# Patient Record
Sex: FEMALE | Race: WHITE | NOT HISPANIC OR LATINO | Employment: PART TIME | URBAN - METROPOLITAN AREA
[De-identification: names, ages, dates, MRNs, and addresses within clinical notes are randomized per-mention and may not be internally consistent; named-entity substitution may affect disease eponyms.]

---
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
## 2019-02-21 ENCOUNTER — OFFICE VISIT CONVERTED (OUTPATIENT)
Dept: NEUROLOGY | Facility: CLINIC | Age: 19
End: 2019-02-21
Attending: PHYSICIAN ASSISTANT

## 2019-05-20 ENCOUNTER — HOSPITAL ENCOUNTER (OUTPATIENT)
Dept: URGENT CARE | Facility: CLINIC | Age: 19
Discharge: HOME | End: 2019-05-20
Attending: NURSE PRACTITIONER

## 2020-05-19 ENCOUNTER — OFFICE VISIT CONVERTED (OUTPATIENT)
Dept: NEUROLOGY | Facility: CLINIC | Age: 20
End: 2020-05-19
Attending: NURSE PRACTITIONER

## 2020-06-01 ENCOUNTER — HOSPITAL ENCOUNTER (OUTPATIENT)
Dept: URGENT CARE | Facility: CLINIC | Age: 20
Discharge: HOME | End: 2020-06-01

## 2021-05-11 ENCOUNTER — OFFICE VISIT CONVERTED (OUTPATIENT)
Dept: NEUROLOGY | Facility: CLINIC | Age: 21
End: 2021-05-11
Attending: NURSE PRACTITIONER

## 2021-05-13 NOTE — PROGRESS NOTES
Progress Note      Patient Name: Janey Wakefield   Patient ID: [id number]   Sex: Female   YOB: 2000    Primary Care Provider: Concetta Sarmiento MD   Referring Provider: Cali Petersen    Visit Date: May 19, 2020    Provider: PAO Ceron   Location: [facility]   Location Address: [address] [place] [address]  [id number]   Location Phone: [phone]          Chief Complaint  · Follow Up Exam      History Of Present Illness  Janey Wakefield is a 19 year old /White female who presents today to [facility] today for a follow up exam. Has history of IIH. Has not had recent ophthalmology consult. Denies recurrent headaches. Denies blurred vision. Denies pulsatile tinnitus.       Past Medical History  Abdominal Pain, RLQ; Adnexal cyst; Bladder problem; IIH (idiopathic intracranial hypertension); Renal Calculus; Ureteral Calculus         Past Surgical History  Lumbar puncture; Ovarian cystectomy; Tonsillectomy         Medication List  Liletta 19.5 mcg/24 hour (4 years) intrauterine intrauterine device; Zoloft 50 mg oral tablet         Allergy List  Percocet         Family Medical History  Ulcerative Colitis; Crohn's Disease; Renal Calculus; Family history of Arthritis; Family history of diabetes mellitus         Social History  Alcohol (Never); Denies illicit substance abuse (Never); High school attendee; lives with parents; Single; Student; Tobacco (Never); Working         Review of Systems  · Constitutional  o Denies  o : chills, excessive sweating, fatigue, fever, sycope/passing out, weight gain, weight loss  · Eyes  o Denies  o : changes in vision, blurry vision, double vision  · HENT  o Admits  o : nasal congestion, seasonal allergies  o Denies  o : loss of hearing, ringing in the ears, ear aches, sore throat, sinus pain, nose bleeds  · Cardiovascular  o Denies  o : blood clots, swollen legs, anemia, easy burising or bleeding,  transfusions  · Respiratory  o Admits  o : dry cough, productive cough  o Denies  o : shortness of breath, pneumonia, COPD  · Gastrointestinal  o Denies  o : difficulty swallowing, reflux  · Genitourinary  o Denies  o : incontinence  · Neurologic  o Denies  o : headache, seizure, stroke, tremor, loss of balance, falls, dizziness/vertigo, difficulty with sleep, numbness/tingling/paresthesia , difficulty with coordination, difficulty with dexterity, weakness  · Musculoskeletal  o Denies  o : neck stiffness/pain, swollen lymph nodes, muscle aches, joint pain, weakness, spasms, sciatica, pain radiating in arm, pain radiating in leg, low back pain  · Endocrine  o Denies  o : diabetes, thyroid disorder  · Psychiatric  o Admits  o : anxiety, depression      Vitals  Date Time BP Position Site L\R Cuff Size HR RR TEMP (F) WT  HT  BMI kg/m2 BSA m2 O2 Sat HC       05/19/2020 04:14 /79 Sitting    95 - R  97.9 184lbs 6oz              Physical Examination  · Constitutional  o Appearance  o : well-nourished, well groomed, in no apparent distress  · Neurologic  o Mental Status Examination  o :   § Orientation  § : Alert and oriented to person, place, and time.   § Speech/Language  § : intact naming, comprehension, and repetition. No dysarthria.  § Fund of Knowledge  § : Adequate fund of knowledge.  o Cranial Nerves  o : Pupils are equal, round and reactive to light. Extraocular movements are intact. Visual fields are full. Fundoscopic examination reveals sharp disc bilaterally. Sensation in the V1-V3 distribution is intact and symmetric. Muscles of mastication are strong and symmetric. Muscles of facial expression are strong and symmetric. Hearing is intact. Palatal raise is intact and symmetric. Uvula is midline. Shoulder shrug is strong. Tongue protrudes in the midline.  o Reflexes  o : 2+ reflexes throughout and symmetric. Negative Rowland. Negative Babinski.   o Sensation  o : Intact sensation to light touch, pinprick,  vibration and proprioception throughout  o Gait and Station  o : The patient is noted to have a normal, narrow based gait with normal arm swing  o Cerebellar Function  o : intact finger to nose and heel to shin. Rapid alternating movements are intact in the upper and lower extremities.           Assessment  · IIH (idiopathic intracranial hypertension)     348.2/G93.2  Recommend f/u with ophthalmology. Discussed s/sx IIH. Currently, she is asymptomatic. Will continue to monitor.     Problems Reconciled  Plan  · Medications  o Medications have been Reconciled  o Transition of Care or Provider Policy  · Instructions  o Encouraged to follow-up with Primary Care Provider for preventative care.  o Call or Return if symptoms worsen or persist.   o Follow up in 6 months.            Electronically Signed by: PAO Ceron -Author on Dorothy 3, 2020 04:08:32 PM

## 2021-05-15 VITALS
HEART RATE: 97 BPM | WEIGHT: 169 LBS | DIASTOLIC BLOOD PRESSURE: 72 MMHG | SYSTOLIC BLOOD PRESSURE: 120 MMHG | HEIGHT: 63 IN | BODY MASS INDEX: 29.95 KG/M2

## 2021-05-15 VITALS
TEMPERATURE: 97.9 F | DIASTOLIC BLOOD PRESSURE: 79 MMHG | HEART RATE: 95 BPM | SYSTOLIC BLOOD PRESSURE: 127 MMHG | WEIGHT: 184.37 LBS

## 2021-05-16 VITALS
WEIGHT: 183.19 LBS | SYSTOLIC BLOOD PRESSURE: 140 MMHG | BODY MASS INDEX: 32.46 KG/M2 | HEART RATE: 90 BPM | DIASTOLIC BLOOD PRESSURE: 74 MMHG | HEIGHT: 63 IN

## 2021-05-16 VITALS
WEIGHT: 173 LBS | HEART RATE: 91 BPM | HEIGHT: 63 IN | SYSTOLIC BLOOD PRESSURE: 119 MMHG | DIASTOLIC BLOOD PRESSURE: 66 MMHG | BODY MASS INDEX: 30.65 KG/M2

## 2021-05-16 VITALS
TEMPERATURE: 98.2 F | BODY MASS INDEX: 31.83 KG/M2 | SYSTOLIC BLOOD PRESSURE: 121 MMHG | WEIGHT: 173 LBS | HEART RATE: 99 BPM | DIASTOLIC BLOOD PRESSURE: 67 MMHG | HEIGHT: 62 IN

## 2021-05-16 VITALS
WEIGHT: 176 LBS | SYSTOLIC BLOOD PRESSURE: 127 MMHG | DIASTOLIC BLOOD PRESSURE: 67 MMHG | HEART RATE: 94 BPM | BODY MASS INDEX: 32.39 KG/M2 | HEIGHT: 62 IN | TEMPERATURE: 98.7 F

## 2021-06-05 NOTE — PROGRESS NOTES
Progress Note      Patient Name: Janey Wakefield   Patient ID: [id number]   Sex: Female   YOB: 2000    Primary Care Provider: Concetta Sarmiento MD   Referring Provider: Cali Petersen    Visit Date: May 11, 2021    Provider: PAO Ceron   [place] [facility]   Location Address: [address] [place] [address]  [id number]   Location Phone: [phone]          Chief Complaint     3 month f/u IIH       History Of Present Illness  Janey Wkaefield is a 19 year old /White female who presents today to [facility] today for a follow up exam. States she was having re-emergence of headaches back in the fall and saw ophthalmology who felt she may being having flare of IIH. However, she did not follow up as planned with them or with our office. Has not been seen by neurology for 1 year. Currently states headaches have improved. Denies pulsatile tinnitus. States that 1 month ago she had an onset of headaches that lasted for several days.       Past Medical History  Abdominal Pain, RLQ; Adnexal cyst; Bladder problem; IIH (idiopathic intracranial hypertension); Renal Calculus; Ureteral Calculus         Past Surgical History  Lumbar puncture; Ovarian cystectomy; Tonsillectomy         Medication List  Liletta 19.5 mcg/24 hour (4 years) intrauterine intrauterine device         Allergy List  Percocet       Allergies Reconciled  Family Medical History  Ulcerative Colitis; Crohn's Disease; Renal Calculus; Family history of Arthritis; Family history of diabetes mellitus         Social History  Alcohol (Never); Denies illicit substance abuse (Never); High school attendee; lives with parents; Single; Student; Tobacco (Never); Working         Review of Systems  · Constitutional  o Denies  o : chills, excessive sweating, fatigue, fever, sycope/passing out, weight gain, weight loss  · Eyes  o Denies  o : changes in vision, blurry vision, double  "vision  · HENT  o Admits  o : nasal congestion, seasonal allergies  o Denies  o : loss of hearing, ringing in the ears, ear aches, sore throat, sinus pain, nose bleeds  · Cardiovascular  o Denies  o : blood clots, swollen legs, anemia, easy burising or bleeding, transfusions  · Respiratory  o Admits  o : dry cough, productive cough  o Denies  o : shortness of breath, pneumonia, COPD  · Gastrointestinal  o Denies  o : difficulty swallowing, reflux  · Genitourinary  o Denies  o : incontinence  · Neurologic  o Denies  o : headache, seizure, stroke, tremor, loss of balance, falls, dizziness/vertigo, difficulty with sleep, numbness/tingling/paresthesia , difficulty with coordination, difficulty with dexterity, weakness  · Musculoskeletal  o Denies  o : neck stiffness/pain, swollen lymph nodes, muscle aches, joint pain, weakness, spasms, sciatica, pain radiating in arm, pain radiating in leg, low back pain  · Endocrine  o Denies  o : diabetes, thyroid disorder  · Psychiatric  o Admits  o : anxiety, depression      Vitals  Date Time BP Position Site L\R Cuff Size HR RR TEMP (F) WT  HT  BMI kg/m2 BSA m2 O2 Sat FR L/min FiO2 HC       05/11/2021 10:08 /86 Sitting    84 - R   185lbs 16oz 5'  3\" 32.95 1.94             Physical Examination  · Constitutional  o Appearance  o : well-nourished, well groomed, in no apparent distress  · Neurologic  o Mental Status Examination  o :   § Orientation  § : Alert and oriented to person, place, and time.   § Speech/Language  § : intact naming, comprehension, and repetition. No dysarthria.  § Fund of Knowledge  § : Adequate fund of knowledge.  o Cranial Nerves  o : Pupils are equal, round and reactive to light. Extraocular movements are intact. Visual fields are full. Fundoscopic examination reveals sharp disc bilaterally. Sensation in the V1-V3 distribution is intact and symmetric. Muscles of mastication are strong and symmetric. Muscles of facial expression are strong and symmetric. " Hearing is intact. Palatal raise is intact and symmetric. Uvula is midline. Shoulder shrug is strong. Tongue protrudes in the midline.  o Reflexes  o : 2+ reflexes throughout and symmetric. Negative Rowland. Negative Babinski.   o Sensation  o : Intact sensation to light touch, pinprick, vibration and proprioception throughout  o Gait and Station  o : The patient is noted to have a normal, narrow based gait with normal arm swing  o Cerebellar Function  o : intact finger to nose and heel to shin. Rapid alternating movements are intact in the upper and lower extremities.               Assessment  · IIH (idiopathic intracranial hypertension)     348.2/G93.2  Asymptomatic today. No papilledema. Will continue to monitor. Spoke with the patient and her mother in great detail regarding compliance with appts and ophthalmology follow up. Did not follow up here or with ophthalmology as scheduled. Discussed high pressure symptoms. They will notify me immediately if she begins to have these symptoms again. Discussed risk of permanent visual loss if pressures are not maintained.       Plan  · Medications  o Medications have been Reconciled  o Transition of Care or Provider Policy  · Instructions  o Total time spent with the patient and coordinating patient care was 35 minutes.   · Disposition  o Call or Return if symptoms worsen or persist.            Electronically Signed by: PAO Ceron -Author on May 14, 2021 09:44:01 AM

## 2021-07-15 VITALS
WEIGHT: 186 LBS | SYSTOLIC BLOOD PRESSURE: 134 MMHG | HEART RATE: 84 BPM | HEIGHT: 63 IN | BODY MASS INDEX: 32.96 KG/M2 | DIASTOLIC BLOOD PRESSURE: 86 MMHG

## 2021-10-11 ENCOUNTER — OFFICE VISIT (OUTPATIENT)
Dept: NEUROLOGY | Facility: CLINIC | Age: 21
End: 2021-10-11

## 2021-10-11 VITALS
SYSTOLIC BLOOD PRESSURE: 130 MMHG | HEART RATE: 100 BPM | HEIGHT: 62 IN | BODY MASS INDEX: 33.16 KG/M2 | WEIGHT: 180.2 LBS | DIASTOLIC BLOOD PRESSURE: 79 MMHG

## 2021-10-11 DIAGNOSIS — G93.2 IIH (IDIOPATHIC INTRACRANIAL HYPERTENSION): Primary | ICD-10-CM

## 2021-10-11 PROCEDURE — 99213 OFFICE O/P EST LOW 20 MIN: CPT | Performed by: NURSE PRACTITIONER

## 2021-10-11 RX ORDER — ACETAZOLAMIDE 500 MG/1
500 CAPSULE, EXTENDED RELEASE ORAL 2 TIMES DAILY
Qty: 60 CAPSULE | Refills: 3 | Status: CANCELLED | OUTPATIENT
Start: 2021-10-11

## 2021-10-11 NOTE — PATIENT INSTRUCTIONS
"Samuel's Clinical Advisor 2018, 1st Edition (1st ed., pp. 690.e5-690.e6). Annie PA: Elsevier.\"> Sb Neurological Surgery (7th ed., pp. 0831-3375.e5). MAKENNA Valencia: Elsevier, Inc.\"> https://www.ncbi.nlm.nih.gov/books/AZA626807/\">   Idiopathic Intracranial Hypertension    Idiopathic intracranial hypertension (IIH) is a condition that increases pressure around the brain. The fluid that surrounds the brain and spinal cord (cerebrospinal fluid, or CSF) increases and causes the pressure. Idiopathic means that the cause of this condition is not known.  IIH affects the brain and spinal cord (neurological disorder). If this condition is not treated, it can cause vision loss or blindness.  What are the causes?  The cause of this condition is not known.  What increases the risk?  The following factors may make you more likely to develop this condition:  · Being very overweight (obese).  · Being a female between the ages of 20 and 50 years old, who has not gone through menopause.  · Taking certain medicines, such as birth control or steroids.  What are the signs or symptoms?  Symptoms of this condition include:  · Headaches. This is the most common symptom.  · Brief episodes of total blindness.  · Double vision, blurred vision, or poor side (peripheral) vision.  · Pain in the shoulders or neck.  · Nausea and vomiting.  · A sound like rushing water or a pulsing sound within the ears (pulsatile tinnitus), or ringing in the ears.  How is this diagnosed?  This condition may be diagnosed based on:  · Your symptoms and medical history.  · Imaging tests of the brain, such as:  ? CT scan.  ? MRI.  ? Magnetic resonance venogram (MRV) to check the veins.  · Diagnostic lumbar puncture. This is a procedure to remove and examine a sample of cerebrospinal fluid. This procedure can determine whether too much fluid may be causing IIH.  · A thorough eye exam to check for swelling or nerve damage in the eyes.  How is this " treated?  Treatment for this condition depends on the symptoms. The goal of treatment is to decrease the pressure around your brain. Common treatments include:  · Weight loss through healthy eating, salt restriction, and exercise, if you are overweight.  · Medicines to decrease the production of spinal fluid and lower the pressure within your skull.  · Medicines to prevent or treat headaches.  Other treatments may include:  · Surgery to place drains (shunts) in your brain for removing excess fluid.  · Lumbar puncture to remove excess cerebrospinal fluid.  Follow these instructions at home:  · If you are overweight or obese, work with your health care provider to lose weight.  · Take over-the-counter and prescription medicines only as told by your health care provider.  · Ask your health care provider if the medicine prescribed to you requires you to avoid driving or using machinery.  · Do not use any products that contain nicotine or tobacco, such as cigarettes, e-cigarettes, and chewing tobacco. If you need help quitting, ask your health care provider.  · Keep all follow-up visits as told by your health care provider. This is important.  Contact a health care provider if:  You have changes in your vision, such as:  · Double vision.  · Blurred vision.  · Poor peripheral vision.  Get help right away if:  You have any of the following symptoms and they get worse or do not get better:  · Headaches.  · Nausea.  · Vomiting.  · Sudden trouble seeing.  Summary  · Idiopathic intracranial hypertension (IIH) is a condition that increases pressure around the brain. The cause is not known (is idiopathic).  · The most common symptom of IIH is headaches. Vision changes, pain in the shoulders or neck, nausea, and vomiting may also occur.  · Treatment for this condition depends on your symptoms. The goal of treatment is to decrease the pressure around your brain.  · If you are overweight or obese, work with your health care  provider to lose weight.  · Take over-the-counter and prescription medicines only as told by your health care provider.  This information is not intended to replace advice given to you by your health care provider. Make sure you discuss any questions you have with your health care provider.  Document Revised: 11/28/2020 Document Reviewed: 11/28/2020  Elsevier Patient Education © 2021 Elsevier Inc.

## 2021-10-11 NOTE — PROGRESS NOTES
"Chief Complaint  Neurologic Problem (IIH)    Subjective          Janey Wakefield presents to [facility]  Remains on diamox. States she's doing well since previous visit.  Denies recurrence of pulsatile tinnitus.  Headaches are well controlled.  Saw ophthalmology recently who stated that her papilledema is improving.       Objective   Vital Signs:   /79   Pulse 100   Ht 157.5 cm (62\")   Wt 81.7 kg (180 lb 3.2 oz)   BMI 32.96 kg/m²     Physical Exam  HENT:      Head: Normocephalic.   Pulmonary:      Effort: Pulmonary effort is normal.   Neurological:      Mental Status: She is alert and oriented to person, place, and time.      Cranial Nerves: Cranial nerves are intact.      Sensory: Sensation is intact.      Motor: Motor function is intact.      Coordination: Coordination is intact.      Deep Tendon Reflexes: Reflexes are normal and symmetric.        Neurologic Exam     Mental Status   Oriented to person, place, and time.        Result Review :               Assessment and Plan    Diagnoses and all orders for this visit:    1. IIH (idiopathic intracranial hypertension) (Primary)  Assessment & Plan:  Continue Diamox at current dose.  Discussed high pressure symptoms and the need to notify the office if those occur.         Follow Up   Return in about 6 months (around 4/11/2022) for IIH.  Patient was given instructions and counseling regarding her condition or for health maintenance advice. Please see specific information pulled into the AVS if appropriate.       "

## 2021-10-12 PROBLEM — G93.2 IIH (IDIOPATHIC INTRACRANIAL HYPERTENSION): Status: ACTIVE | Noted: 2021-10-12

## 2021-10-12 NOTE — ASSESSMENT & PLAN NOTE
Continue Diamox at current dose.  Discussed high pressure symptoms and the need to notify the office if those occur.

## 2022-12-28 ENCOUNTER — TELEPHONE (OUTPATIENT)
Dept: INTERNAL MEDICINE | Facility: CLINIC | Age: 22
End: 2022-12-28

## 2023-01-19 ENCOUNTER — OFFICE VISIT (OUTPATIENT)
Dept: INTERNAL MEDICINE | Facility: CLINIC | Age: 23
End: 2023-01-19
Payer: COMMERCIAL

## 2023-01-19 VITALS
TEMPERATURE: 98.1 F | WEIGHT: 164.31 LBS | HEIGHT: 62 IN | SYSTOLIC BLOOD PRESSURE: 120 MMHG | BODY MASS INDEX: 30.24 KG/M2 | HEART RATE: 102 BPM | OXYGEN SATURATION: 98 % | DIASTOLIC BLOOD PRESSURE: 82 MMHG

## 2023-01-19 DIAGNOSIS — Z00.00 ENCOUNTER FOR MEDICAL EXAMINATION TO ESTABLISH CARE: Primary | ICD-10-CM

## 2023-01-19 DIAGNOSIS — F41.1 GENERALIZED ANXIETY DISORDER: Chronic | ICD-10-CM

## 2023-01-19 DIAGNOSIS — Z13.220 SCREENING FOR LIPID DISORDERS: ICD-10-CM

## 2023-01-19 DIAGNOSIS — Z23 NEED FOR IMMUNIZATION AGAINST INFLUENZA: ICD-10-CM

## 2023-01-19 DIAGNOSIS — Z11.59 NEED FOR HEPATITIS C SCREENING TEST: ICD-10-CM

## 2023-01-19 DIAGNOSIS — F39 MOOD DISORDER: ICD-10-CM

## 2023-01-19 LAB
ALBUMIN SERPL-MCNC: 4.5 G/DL (ref 3.5–5.2)
ALBUMIN/GLOB SERPL: 1.8 G/DL
ALP SERPL-CCNC: 70 U/L (ref 39–117)
ALT SERPL W P-5'-P-CCNC: 19 U/L (ref 1–33)
ANION GAP SERPL CALCULATED.3IONS-SCNC: 9.4 MMOL/L (ref 5–15)
AST SERPL-CCNC: 15 U/L (ref 1–32)
BASOPHILS # BLD AUTO: 0.07 10*3/MM3 (ref 0–0.2)
BASOPHILS NFR BLD AUTO: 0.8 % (ref 0–1.5)
BILIRUB SERPL-MCNC: 0.6 MG/DL (ref 0–1.2)
BUN SERPL-MCNC: 9 MG/DL (ref 6–20)
BUN/CREAT SERPL: 13.8 (ref 7–25)
CALCIUM SPEC-SCNC: 9.5 MG/DL (ref 8.6–10.5)
CHLORIDE SERPL-SCNC: 102 MMOL/L (ref 98–107)
CHOLEST SERPL-MCNC: 198 MG/DL (ref 0–200)
CO2 SERPL-SCNC: 25.6 MMOL/L (ref 22–29)
CREAT SERPL-MCNC: 0.65 MG/DL (ref 0.57–1)
DEPRECATED RDW RBC AUTO: 37.6 FL (ref 37–54)
EGFRCR SERPLBLD CKD-EPI 2021: 127.1 ML/MIN/1.73
EOSINOPHIL # BLD AUTO: 0.08 10*3/MM3 (ref 0–0.4)
EOSINOPHIL NFR BLD AUTO: 0.9 % (ref 0.3–6.2)
ERYTHROCYTE [DISTWIDTH] IN BLOOD BY AUTOMATED COUNT: 11 % (ref 12.3–15.4)
GLOBULIN UR ELPH-MCNC: 2.5 GM/DL
GLUCOSE SERPL-MCNC: 80 MG/DL (ref 65–99)
HCT VFR BLD AUTO: 41.6 % (ref 34–46.6)
HDLC SERPL-MCNC: 50 MG/DL (ref 40–60)
HGB BLD-MCNC: 14.6 G/DL (ref 12–15.9)
IMM GRANULOCYTES # BLD AUTO: 0.05 10*3/MM3 (ref 0–0.05)
IMM GRANULOCYTES NFR BLD AUTO: 0.6 % (ref 0–0.5)
LDLC SERPL CALC-MCNC: 136 MG/DL (ref 0–100)
LDLC/HDLC SERPL: 2.69 {RATIO}
LYMPHOCYTES # BLD AUTO: 2.25 10*3/MM3 (ref 0.7–3.1)
LYMPHOCYTES NFR BLD AUTO: 25.6 % (ref 19.6–45.3)
MCH RBC QN AUTO: 32.5 PG (ref 26.6–33)
MCHC RBC AUTO-ENTMCNC: 35.1 G/DL (ref 31.5–35.7)
MCV RBC AUTO: 92.7 FL (ref 79–97)
MONOCYTES # BLD AUTO: 0.69 10*3/MM3 (ref 0.1–0.9)
MONOCYTES NFR BLD AUTO: 7.9 % (ref 5–12)
NEUTROPHILS NFR BLD AUTO: 5.64 10*3/MM3 (ref 1.7–7)
NEUTROPHILS NFR BLD AUTO: 64.2 % (ref 42.7–76)
NRBC BLD AUTO-RTO: 0 /100 WBC (ref 0–0.2)
PLATELET # BLD AUTO: 338 10*3/MM3 (ref 140–450)
PMV BLD AUTO: 10.4 FL (ref 6–12)
POTASSIUM SERPL-SCNC: 4.1 MMOL/L (ref 3.5–5.2)
PROT SERPL-MCNC: 7 G/DL (ref 6–8.5)
RBC # BLD AUTO: 4.49 10*6/MM3 (ref 3.77–5.28)
SODIUM SERPL-SCNC: 137 MMOL/L (ref 136–145)
TRIGL SERPL-MCNC: 67 MG/DL (ref 0–150)
TSH SERPL DL<=0.05 MIU/L-ACNC: 1.12 UIU/ML (ref 0.27–4.2)
VLDLC SERPL-MCNC: 12 MG/DL (ref 5–40)
WBC NRBC COR # BLD: 8.78 10*3/MM3 (ref 3.4–10.8)

## 2023-01-19 PROCEDURE — 99214 OFFICE O/P EST MOD 30 MIN: CPT | Performed by: NURSE PRACTITIONER

## 2023-01-19 PROCEDURE — 80061 LIPID PANEL: CPT | Performed by: NURSE PRACTITIONER

## 2023-01-19 PROCEDURE — 84443 ASSAY THYROID STIM HORMONE: CPT | Performed by: NURSE PRACTITIONER

## 2023-01-19 PROCEDURE — 80053 COMPREHEN METABOLIC PANEL: CPT | Performed by: NURSE PRACTITIONER

## 2023-01-19 PROCEDURE — 86803 HEPATITIS C AB TEST: CPT | Performed by: NURSE PRACTITIONER

## 2023-01-19 PROCEDURE — 90686 IIV4 VACC NO PRSV 0.5 ML IM: CPT | Performed by: NURSE PRACTITIONER

## 2023-01-19 PROCEDURE — 85025 COMPLETE CBC W/AUTO DIFF WBC: CPT | Performed by: NURSE PRACTITIONER

## 2023-01-19 PROCEDURE — 90471 IMMUNIZATION ADMIN: CPT | Performed by: NURSE PRACTITIONER

## 2023-01-19 RX ORDER — BUSPIRONE HYDROCHLORIDE 5 MG/1
5 TABLET ORAL 3 TIMES DAILY
Qty: 90 TABLET | Refills: 1 | Status: SHIPPED | OUTPATIENT
Start: 2023-01-19 | End: 2023-03-08

## 2023-01-19 NOTE — PROGRESS NOTES
Chief Complaint  Establish Care, Depression (Has been on medication before), and Anxiety    Subjective          Janey Wakefield presents to [facility]  Anxiety  Presents for initial visit. Symptoms include decreased concentration, depressed mood, excessive worry, insomnia, irritability, nervous/anxious behavior and obsessions. Patient reports no chest pain, compulsions, confusion, dizziness, dry mouth, feeling of choking, hyperventilation, impotence, malaise, muscle tension, nausea, palpitations, panic, restlessness, shortness of breath or suicidal ideas. Symptoms occur constantly. The severity of symptoms is moderate and interfering with daily activities.     Her past medical history is significant for depression. Past treatments include SSRIs and non-SSRI antidepressants. The treatment provided no relief. Compliance with prior treatments has been good.   Depression  Visit Type: initial  Patient presents with the following symptoms: decreased concentration, depressed mood, excessive worry, insomnia, irritability, nervousness/anxiety and obsessions.  Patient is not experiencing: anhedonia, chest pain, choking sensation, compulsions, confusion, dizziness, dry mouth, fatigue, feelings of hopelessness, feelings of worthlessness, hypersomnia, hyperventilation, impotence, malaise, memory impairment, muscle tension, nausea, palpitations, panic, psychomotor agitation, psychomotor retardation, restlessness, shortness of breath, suicidal ideas, suicidal planning, thoughts of death, weight gain and weight loss.  Severity: interfering with daily activities   Treatment tried: non-SSRI antidepressants and SSRI  Compliance with treatment: good  Improvement on treatment: no relief      Struggles with promiscuity and spending a lot of money   5 days or so was in a really good place productive happy positive. Woke up this morning depressive state crying   Previous PCP: madelaine  "revore  COVID Vaccine: never  Tdap: 2010   Pneumonia: not of age  Shingles: not of age  Specialist(s): optho (Dr. Petersen), neuro (America Rice), has seen urology  Colonoscopy: never  Mammogram: never, no FHx of breast cancer  PAP Smear: a couple of months ago (dr. locke)  DEXA/Bone Density: not of age        Current Outpatient Medications   Medication Instructions   • acetaZOLAMIDE (DIAMOX) 500 mg, Oral, 2 Times Daily   • busPIRone (BUSPAR) 5 mg, Oral, 3 Times Daily   • fluticasone (FLONASE) 50 MCG/ACT nasal spray 2 sprays, Nasal, Daily   • Levonorgestrel (Liletta, 52 MG,) 19.5 MCG/DAY intrauterine device No dose, route, or frequency recorded.       The following portions of the patient's history were reviewed and updated as appropriate: allergies, current medications, past family history, past medical history, past social history, past surgical history, and problem list.    Objective   Vital Signs:   /82 (BP Location: Left arm, Patient Position: Sitting, Cuff Size: Adult)   Pulse 102   Temp 98.1 °F (36.7 °C) (Temporal)   Ht 157.5 cm (62\")   Wt 74.5 kg (164 lb 5 oz)   SpO2 98%   BMI 30.05 kg/m²     Wt Readings from Last 3 Encounters:   01/19/23 74.5 kg (164 lb 5 oz)   10/11/21 81.7 kg (180 lb 3.2 oz)   08/10/21 80.6 kg (177 lb 12.8 oz)     BP Readings from Last 3 Encounters:   01/19/23 120/82   10/11/21 130/79   08/10/21 130/75     Physical Exam   Appearance: No acute distress, well-nourished  Head: normocephalic, atraumatic  Eyes: extraocular movements intact, no scleral icterus, no conjunctival injection  Ears, Nose, and Throat: external ears normal, nares patent, moist mucous membranes  Cardiovascular: regular rate and rhythm. no murmurs, rubs, or gallops. no edema  Respiratory: breathing comfortably, symmetric chest rise, clear to auscultation bilaterally. No wheezes, rales, or rhonchi.  Neuro: alert and oriented to time, place, and person. Normal gait  Psych: normal mood and affect     Result " Review :   The following data was reviewed by: PAO Veloz on 01/19/2023:  Common labs    Common Labs 1/19/23 1/19/23 1/19/23    1632 1632 1632   Glucose  80    BUN  9    Creatinine  0.65    Sodium  137    Potassium  4.1    Chloride  102    Calcium  9.5    Albumin  4.5    Total Bilirubin  0.6    Alkaline Phosphatase  70    AST (SGOT)  15    ALT (SGPT)  19    WBC 8.78     Hemoglobin 14.6     Hematocrit 41.6     Platelets 338     Total Cholesterol   198   Triglycerides   67   HDL Cholesterol   50   LDL Cholesterol    136 (A)   (A) Abnormal value                   Lab Results   Component Value Date    SARSANTIGEN Not Detected 09/25/2021    RAPSCRN Negative 08/10/2021       Procedures        Assessment and Plan    Diagnoses and all orders for this visit:    1. Encounter for medical examination to establish care (Primary)    2. Need for immunization against influenza  -     FluLaval/Fluzone >6 mos  ([id number])    3. Generalized anxiety disorder  Comments:  will RX Buspar   Orders:  -     CBC & Differential  -     TSH Rfx On Abnormal To Free T4  -     Comprehensive Metabolic Panel  -     GeneSight - Swab,; Future  -     busPIRone (BUSPAR) 5 MG tablet; Take 1 tablet by mouth 3 (Three) Times a Day.  Dispense: 90 tablet; Refill: 1    4. Mood disorder (HCC)  -     CBC & Differential  -     TSH Rfx On Abnormal To Free T4  -     Comprehensive Metabolic Panel  -     GeneSight - Swab,; Future    5. Screening for lipid disorders  -     Lipid Panel    6. Need for hepatitis C screening test  -     HCV Antibody Rfx To Qnt PCR  -     Interpretation:      + MDQ - will wait for Genesight results and give antipsychotic/mood stabilizer. I believe patient's mood disorder has been misdiagnosed as anxiety/depression      Medications Discontinued During This Encounter   Medication Reason   • cetirizine-pseudoephedrine (ZyrTEC-D) 5-120 MG per 12 hr tablet *Therapy completed          Follow Up   Return in about 6 weeks (around  3/2/2023) for Anxiety, Depression, Video visit.  Patient was given instructions and counseling regarding her condition or for health maintenance advice. Please see specific information pulled into the AVS if appropriate.       Padmini Hernández, PAO  01/23/23  11:38 EST

## 2023-01-21 LAB
HCV AB S/CO SERPL IA: <0.1 S/CO RATIO (ref 0–0.9)
HCV AB SERPL QL IA: NORMAL

## 2023-01-23 PROBLEM — F39 MOOD DISORDER: Status: ACTIVE | Noted: 2023-01-23

## 2023-01-23 PROBLEM — F41.1 GENERALIZED ANXIETY DISORDER: Status: ACTIVE | Noted: 2023-01-23

## 2023-01-30 ENCOUNTER — TELEPHONE (OUTPATIENT)
Dept: INTERNAL MEDICINE | Facility: CLINIC | Age: 23
End: 2023-01-30
Payer: COMMERCIAL

## 2023-01-30 NOTE — TELEPHONE ENCOUNTER
Caller: Janey Wakefield    Relationship: Self    Best call back number: [phone]    What test was performed: GENE SIGHT TESTING     When was the test performed: 1.19.23    Where was the test performed: IN OFFICE

## 2023-01-31 DIAGNOSIS — F39 MOOD DISORDER: Primary | ICD-10-CM

## 2023-01-31 RX ORDER — ARIPIPRAZOLE 2 MG/1
2 TABLET ORAL DAILY
Qty: 45 TABLET | Refills: 1 | Status: SHIPPED | OUTPATIENT
Start: 2023-01-31 | End: 2023-03-08 | Stop reason: SDUPTHER

## 2023-01-31 NOTE — TELEPHONE ENCOUNTER
Caller: Janey Wakefield    Relationship: Self    Best call back number: [phone]    What is the best time to reach you: ANY     Who are you requesting to speak with (clinical staff, provider,  specific staff member): CLINICAL       What was the call regarding: PATIENT CALLED TO CHECK ON THE STATUS OF HER TESTING AND REQUESTED SOMEONE CALL HER BACK AS SOON AS POSSIBLE WITH RESULTS.     Do you require a callback: YES

## 2023-02-01 ENCOUNTER — TELEPHONE (OUTPATIENT)
Dept: INTERNAL MEDICINE | Facility: CLINIC | Age: 23
End: 2023-02-01
Payer: COMMERCIAL

## 2023-02-01 NOTE — TELEPHONE ENCOUNTER
I CONTACTED PT PHARMACY AND TALKED TO NELLY, SHE STATED THEY RECEIVED THE PRESCRIPTION ON 1/31/2023 AND IT IS READY FOR PICKUP

## 2023-02-01 NOTE — TELEPHONE ENCOUNTER
CONTACTED PT TO NOTIFY OF PHARMACY INFORMATION- PT VERIFIED  I INFORMED PT THAT HER ABILIFY HAS BEEN SENT TO THE PHARMACY AND I MADE A 6 WEEK F/U APPT WITH ROSETTE PER PROVIDER REQUEST

## 2023-02-01 NOTE — TELEPHONE ENCOUNTER
Caller: JANNETTE MARTINEZ    Relationship: SELF    Best call back number: [phone]    Requested Prescriptions:   MEDICATION FOR BIPOLAR    Pharmacy where request should be sent: [facility] [address] - [phone]  - [phone] FX     Additional details provided by patient: PHARMACY HAS NOT RECEIVED ANYTHING FOR BIPOLAR MEDICATION THAT PATIENT CAN TAKE. SHE IS REQUESTING STATUS ON THIS.     Cassandra Bustillo Rep   02/01/23 16:27 EST

## 2023-03-08 ENCOUNTER — OFFICE VISIT (OUTPATIENT)
Dept: INTERNAL MEDICINE | Facility: CLINIC | Age: 23
End: 2023-03-08
Payer: COMMERCIAL

## 2023-03-08 VITALS
TEMPERATURE: 98.4 F | SYSTOLIC BLOOD PRESSURE: 108 MMHG | BODY MASS INDEX: 30.36 KG/M2 | DIASTOLIC BLOOD PRESSURE: 72 MMHG | WEIGHT: 165 LBS | OXYGEN SATURATION: 98 % | HEART RATE: 91 BPM | HEIGHT: 62 IN

## 2023-03-08 DIAGNOSIS — F39 MOOD DISORDER: Primary | Chronic | ICD-10-CM

## 2023-03-08 DIAGNOSIS — F41.1 GENERALIZED ANXIETY DISORDER: Chronic | ICD-10-CM

## 2023-03-08 PROBLEM — N32.9 BLADDER PROBLEM: Status: RESOLVED | Noted: 2023-03-08 | Resolved: 2023-03-08

## 2023-03-08 PROBLEM — N20.0 RENAL CALCULUS: Status: RESOLVED | Noted: 2023-03-08 | Resolved: 2023-03-08

## 2023-03-08 PROBLEM — N20.0 RENAL CALCULUS: Status: ACTIVE | Noted: 2023-03-08

## 2023-03-08 PROBLEM — G93.2 IIH (IDIOPATHIC INTRACRANIAL HYPERTENSION): Status: RESOLVED | Noted: 2021-10-12 | Resolved: 2023-03-08

## 2023-03-08 PROBLEM — N32.9 BLADDER PROBLEM: Status: ACTIVE | Noted: 2023-03-08

## 2023-03-08 PROCEDURE — 99214 OFFICE O/P EST MOD 30 MIN: CPT | Performed by: NURSE PRACTITIONER

## 2023-03-08 RX ORDER — BUSPIRONE HYDROCHLORIDE 15 MG/1
15 TABLET ORAL 3 TIMES DAILY
Qty: 90 TABLET | Refills: 1 | Status: SHIPPED | OUTPATIENT
Start: 2023-03-08

## 2023-03-08 RX ORDER — ARIPIPRAZOLE 2 MG/1
2 TABLET ORAL DAILY
Qty: 90 TABLET | Refills: 0 | Status: SHIPPED | OUTPATIENT
Start: 2023-03-08

## 2023-03-08 NOTE — PROGRESS NOTES
Chief Complaint  Anxiety (6 week follow-up) and Depression (6 week follow-up)    Subjective          Janey Wakefield presents to [facility]  History of Present Illness    Anxiety  Presents for follow-upl visit. Symptoms include decreased concentration, depressed mood, excessive worry, insomnia, irritability, nervous/anxious behavior and obsessions. Patient reports no chest pain, compulsions, confusion, dizziness, dry mouth, feeling of choking, hyperventilation, impotence, malaise, muscle tension, nausea, palpitations, panic, restlessness, shortness of breath or suicidal ideas. Symptoms occur constantly. The severity of symptoms is moderate and interfering with daily activities.     Her past medical history is significant for depression. Past treatments include SSRIs and non-SSRI antidepressants. The treatment provided no relief. Compliance with prior treatments has been good.     Depression  Visit Type: follow-up  Patient presents with the following symptoms: decreased concentration, depressed mood, excessive worry, insomnia, irritability, nervousness/anxiety and obsessions.  Patient is not experiencing: anhedonia, chest pain, choking sensation, compulsions, confusion, dizziness, dry mouth, fatigue, feelings of hopelessness, feelings of worthlessness, hypersomnia, hyperventilation, impotence, malaise, memory impairment, muscle tension, nausea, palpitations, panic, psychomotor agitation, psychomotor retardation, restlessness, shortness of breath, suicidal ideas, suicidal planning, thoughts of death, weight gain and weight loss.  Severity: interfering with daily activities   Treatment tried: non-SSRI antidepressants and SSRI  Compliance with treatment: good  Improvement on treatment: no relief      Everyone has noticed a difference. Feels more like herself. The anxiety medicine could be increased.     Current Outpatient Medications   Medication Instructions   •  "ARIPiprazole (ABILIFY) 2 mg, Oral, Daily   • busPIRone (BUSPAR) 15 mg, Oral, 3 Times Daily   • fluticasone (FLONASE) 50 MCG/ACT nasal spray 2 sprays, Nasal, Daily   • Levonorgestrel (Liletta, 52 MG,) 19.5 MCG/DAY intrauterine device No dose, route, or frequency recorded.       The following portions of the patient's history were reviewed and updated as appropriate: allergies, current medications, past family history, past medical history, past social history, past surgical history, and problem list.    Objective   Vital Signs:   /72 (BP Location: Left arm, Patient Position: Sitting, Cuff Size: Large Adult)   Pulse 91   Temp 98.4 °F (36.9 °C) (Temporal)   Ht 157.5 cm (62\")   Wt 74.8 kg (165 lb)   SpO2 98%   BMI 30.18 kg/m²     Wt Readings from Last 3 Encounters:   03/08/23 74.8 kg (165 lb)   01/19/23 74.5 kg (164 lb 5 oz)   10/11/21 81.7 kg (180 lb 3.2 oz)     BP Readings from Last 3 Encounters:   03/08/23 108/72   01/19/23 120/82   10/11/21 130/79     Physical Exam   Appearance: No acute distress, well-nourished  Head: normocephalic, atraumatic  Eyes: extraocular movements intact, no scleral icterus, no conjunctival injection  Ears, Nose, and Throat: external ears normal, nares patent, moist mucous membranes  Cardiovascular: regular rate and rhythm. no murmurs, rubs, or gallops. no edema  Respiratory: breathing comfortably, symmetric chest rise, clear to auscultation bilaterally. No wheezes, rales, or rhonchi.  Neuro: alert and oriented to time, place, and person. Normal gait  Psych: normal mood and affect     Result Review :   The following data was reviewed by: PAO Veloz on 03/08/2023:  Common labs    Common Labs 1/19/23 1/19/23 1/19/23    1632 1632 1632   Glucose  80    BUN  9    Creatinine  0.65    Sodium  137    Potassium  4.1    Chloride  102    Calcium  9.5    Albumin  4.5    Total Bilirubin  0.6    Alkaline Phosphatase  70    AST (SGOT)  15    ALT (SGPT)  19    WBC 8.78   "   Hemoglobin 14.6     Hematocrit 41.6     Platelets 338     Total Cholesterol   198   Triglycerides   67   HDL Cholesterol   50   LDL Cholesterol    136 (A)   (A) Abnormal value                   Lab Results   Component Value Date    SARSANTIGEN Not Detected 09/25/2021    RAPSCRN Negative 08/10/2021       Procedures        Assessment and Plan    Diagnoses and all orders for this visit:    1. Mood disorder (HCC) (Primary)  Assessment & Plan:  Psychological condition is stable .  Continue current treatment regimen.  Regular aerobic exercise.  Psychological condition  will be reassessed in 3 months.        Orders:  -     ARIPiprazole (Abilify) 2 MG tablet; Take 1 tablet by mouth Daily.  Dispense: 90 tablet; Refill: 0    2. Generalized anxiety disorder  Assessment & Plan:  Psychological condition is improving with treatment.  Regular aerobic exercise.  Medication changes per orders.  Psychological condition  will be reassessed in 3 months.    Increasing Buspar to 15 mg TID    Orders:  -     busPIRone (BUSPAR) 15 MG tablet; Take 1 tablet by mouth 3 (Three) Times a Day.  Dispense: 90 tablet; Refill: 1        Medications Discontinued During This Encounter   Medication Reason   • acetaZOLAMIDE (DIAMOX) 500 MG capsule    • busPIRone (BUSPAR) 5 MG tablet    • ARIPiprazole (Abilify) 2 MG tablet Reorder          Follow Up   Return in about 3 months (around 6/8/2023) for Anxiety, Depression.  Patient was given instructions and counseling regarding her condition or for health maintenance advice. Please see specific information pulled into the AVS if appropriate.       Padmini Hernández, APRN  03/08/23  08:43 EST

## 2023-03-08 NOTE — ASSESSMENT & PLAN NOTE
Psychological condition is stable .  Continue current treatment regimen.  Regular aerobic exercise.  Psychological condition  will be reassessed in 3 months.

## 2023-03-08 NOTE — ASSESSMENT & PLAN NOTE
Psychological condition is improving with treatment.  Regular aerobic exercise.  Medication changes per orders.  Psychological condition  will be reassessed in 3 months.    Increasing Buspar to 15 mg TID

## 2023-09-14 DIAGNOSIS — F39 MOOD DISORDER: Chronic | ICD-10-CM

## 2023-09-14 RX ORDER — ARIPIPRAZOLE 5 MG/1
TABLET ORAL
Qty: 30 TABLET | Refills: 1 | Status: SHIPPED | OUTPATIENT
Start: 2023-09-14

## 2023-10-10 DIAGNOSIS — F41.1 GENERALIZED ANXIETY DISORDER: Chronic | ICD-10-CM

## 2023-10-10 NOTE — TELEPHONE ENCOUNTER
Caller: HEAVENLY MARTINEZ    Relationship: Mother    Best call back number: [phone]     Requested Prescriptions:   Requested Prescriptions     Pending Prescriptions Disp Refills    busPIRone (BUSPAR) 15 MG tablet 90 tablet 1     Sig: Take 1 tablet by mouth 3 (Three) Times a Day.        Pharmacy where request should be sent: [facility] [address] [place] [address] - [phone]  - [phone]     Last office visit with prescribing clinician: 7/13/2023   Last telemedicine visit with prescribing clinician: Visit date not found   Next office visit with prescribing clinician: Visit date not found     Additional details provided by patient: OUT OF MEDICATION     Does the patient have less than a 3 day supply:  [x] Yes  [] No    Would you like a call back once the refill request has been completed: [] Yes [] No    If the office needs to give you a call back, can they leave a voicemail: [] Yes [] No    Cassandra Reich Rep   10/10/23 16:39 EDT

## 2023-10-11 RX ORDER — BUSPIRONE HYDROCHLORIDE 15 MG/1
15 TABLET ORAL 3 TIMES DAILY
Qty: 90 TABLET | Refills: 1 | Status: SHIPPED | OUTPATIENT
Start: 2023-10-11

## 2023-11-28 ENCOUNTER — TELEPHONE (OUTPATIENT)
Dept: INTERNAL MEDICINE | Facility: CLINIC | Age: 23
End: 2023-11-28
Payer: COMMERCIAL

## 2023-11-28 DIAGNOSIS — F39 MOOD DISORDER: Chronic | ICD-10-CM

## 2023-11-28 NOTE — TELEPHONE ENCOUNTER
Caller: LateshasJaney    Relationship: Self    Best call back number: [phone]     Requested Prescriptions:   Requested Prescriptions     Pending Prescriptions Disp Refills    ARIPiprazole (ABILIFY) 5 MG tablet 30 tablet 1     Sig: Take 1 tablet by mouth Daily.        Pharmacy where request should be sent: [facility] [place] [id number] [place] [address] - [phone]  - [phone] FX     Last office visit with prescribing clinician: 7/13/2023   Last telemedicine visit with prescribing clinician: Visit date not found   Next office visit with prescribing clinician: Visit date not found     Does the patient have less than a 3 day supply:  [x] Yes  [] No    Would you like a call back once the refill request has been completed: [] Yes [x] No    If the office needs to give you a call back, can they leave a voicemail: [] Yes [x] No    America Orellana, PCT   11/28/23 15:48 EST

## 2023-11-29 RX ORDER — ARIPIPRAZOLE 5 MG/1
5 TABLET ORAL DAILY
Qty: 30 TABLET | Refills: 1 | Status: SHIPPED | OUTPATIENT
Start: 2023-11-29

## 2024-07-01 PROCEDURE — 87480 CANDIDA DNA DIR PROBE: CPT

## 2024-07-01 PROCEDURE — 87186 SC STD MICRODIL/AGAR DIL: CPT

## 2024-07-01 PROCEDURE — 87510 GARDNER VAG DNA DIR PROBE: CPT

## 2024-07-01 PROCEDURE — 87077 CULTURE AEROBIC IDENTIFY: CPT

## 2024-07-01 PROCEDURE — 87086 URINE CULTURE/COLONY COUNT: CPT

## 2024-07-01 PROCEDURE — 87660 TRICHOMONAS VAGIN DIR PROBE: CPT

## 2024-07-03 DIAGNOSIS — N39.0 ACUTE UTI: Primary | ICD-10-CM

## 2024-07-03 DIAGNOSIS — N76.0 GARDNERELLA VAGINALIS INFECTION: ICD-10-CM

## 2024-07-03 DIAGNOSIS — B96.89 GARDNERELLA VAGINALIS INFECTION: ICD-10-CM

## 2024-07-03 RX ORDER — METRONIDAZOLE 500 MG/1
500 TABLET ORAL 2 TIMES DAILY
Qty: 14 TABLET | Refills: 0 | Status: SHIPPED | OUTPATIENT
Start: 2024-07-03 | End: 2024-07-10

## 2024-07-03 RX ORDER — SULFAMETHOXAZOLE AND TRIMETHOPRIM 800; 160 MG/1; MG/1
1 TABLET ORAL 2 TIMES DAILY
Qty: 14 TABLET | Refills: 0 | Status: SHIPPED | OUTPATIENT
Start: 2024-07-03

## 2024-07-08 ENCOUNTER — TELEPHONE (OUTPATIENT)
Dept: INTERNAL MEDICINE | Facility: CLINIC | Age: 24
End: 2024-07-08

## 2024-07-08 NOTE — TELEPHONE ENCOUNTER
Caller: ROYCE MARTINEZ    Relationship: Mother    Best call back number: [phone]       What was the call regarding:       THE PATIENT'S MOTHER SAID THE PATIENT LOST HER ANTIBIOTIC THAT WAS PRESCRIBED BY PCP MARIA R AND IS WANTING TO KNOW IF PCP WOULD PRESCRIBE ANOTHER ONE.         [facility] [place], [place] - [address] [place] - [phone]  - [phone]  [phone]       SHE  IS REQUESTING  A CALL TO ADVISE IF THIS CAN BE DONE

## 2024-07-11 ENCOUNTER — PATIENT ROUNDING (BHMG ONLY) (OUTPATIENT)
Dept: URGENT CARE | Facility: CLINIC | Age: 24
End: 2024-07-11
Payer: COMMERCIAL

## 2024-09-27 ENCOUNTER — ANESTHESIA EVENT (OUTPATIENT)
Dept: PERIOP | Facility: HOSPITAL | Age: 24
End: 2024-09-27
Payer: COMMERCIAL

## 2024-09-27 ENCOUNTER — APPOINTMENT (OUTPATIENT)
Dept: CT IMAGING | Facility: HOSPITAL | Age: 24
End: 2024-09-27
Payer: COMMERCIAL

## 2024-09-27 ENCOUNTER — HOSPITAL ENCOUNTER (OUTPATIENT)
Facility: HOSPITAL | Age: 24
Setting detail: OBSERVATION
Discharge: HOME | End: 2024-09-28
Attending: EMERGENCY MEDICINE | Admitting: OBSTETRICS & GYNECOLOGY
Payer: COMMERCIAL

## 2024-09-27 ENCOUNTER — ANESTHESIA (OUTPATIENT)
Dept: PERIOP | Facility: HOSPITAL | Age: 24
End: 2024-09-27
Payer: COMMERCIAL

## 2024-09-27 DIAGNOSIS — N83.209 CYST OF OVARY, UNSPECIFIED LATERALITY: ICD-10-CM

## 2024-09-27 DIAGNOSIS — R10.9 ABDOMINAL PAIN, UNSPECIFIED ABDOMINAL LOCATION: ICD-10-CM

## 2024-09-27 DIAGNOSIS — N83.201 RIGHT OVARIAN CYST: ICD-10-CM

## 2024-09-27 DIAGNOSIS — K66.1 HEMOPERITONEUM: Primary | ICD-10-CM

## 2024-09-27 PROBLEM — Z48.89 ENCOUNTER FOR POSTOPERATIVE CARE: Status: ACTIVE | Noted: 2024-09-27

## 2024-09-27 PROBLEM — D36.9 DERMOID CYST: Status: ACTIVE | Noted: 2024-09-27

## 2024-09-27 PROBLEM — D36.9 DERMOID CYST: Status: RESOLVED | Noted: 2024-09-27 | Resolved: 2024-09-27

## 2024-09-27 PROBLEM — D62 ACUTE BLOOD LOSS ANEMIA: Status: ACTIVE | Noted: 2024-09-27

## 2024-09-27 PROBLEM — N83.202 HEMORRHAGIC CYST OF LEFT OVARY: Status: ACTIVE | Noted: 2024-09-27

## 2024-09-27 LAB
ABO GROUP BLD: NORMAL
ABO GROUP BLD: NORMAL
ALBUMIN SERPL-MCNC: 3.9 G/DL (ref 3.5–5.2)
ALBUMIN/GLOB SERPL: 1.6 G/DL
ALP SERPL-CCNC: 66 U/L (ref 39–117)
ALT SERPL W P-5'-P-CCNC: 21 U/L (ref 1–33)
ANION GAP SERPL CALCULATED.3IONS-SCNC: 10.9 MMOL/L (ref 5–15)
AST SERPL-CCNC: 16 U/L (ref 1–32)
BASOPHILS # BLD AUTO: 0.1 10*3/MM3 (ref 0–0.2)
BASOPHILS NFR BLD AUTO: 0.6 % (ref 0–1.5)
BILIRUB SERPL-MCNC: 0.6 MG/DL (ref 0–1.2)
BILIRUB UR QL STRIP: NEGATIVE
BLD GP AB SCN SERPL QL: NEGATIVE
BUN SERPL-MCNC: 14 MG/DL (ref 6–20)
BUN/CREAT SERPL: 21.9 (ref 7–25)
CALCIUM SPEC-SCNC: 8.6 MG/DL (ref 8.6–10.5)
CHLORIDE SERPL-SCNC: 103 MMOL/L (ref 98–107)
CLARITY UR: ABNORMAL
CO2 SERPL-SCNC: 21.1 MMOL/L (ref 22–29)
COLOR UR: YELLOW
CREAT SERPL-MCNC: 0.64 MG/DL (ref 0.57–1)
DEPRECATED RDW RBC AUTO: 45.1 FL (ref 37–54)
EGFRCR SERPLBLD CKD-EPI 2021: 126 ML/MIN/1.73
EOSINOPHIL # BLD AUTO: 0.18 10*3/MM3 (ref 0–0.4)
EOSINOPHIL NFR BLD AUTO: 1 % (ref 0.3–6.2)
ERYTHROCYTE [DISTWIDTH] IN BLOOD BY AUTOMATED COUNT: 13.1 % (ref 12.3–15.4)
GLOBULIN UR ELPH-MCNC: 2.5 GM/DL
GLUCOSE SERPL-MCNC: 117 MG/DL (ref 65–99)
GLUCOSE UR STRIP-MCNC: NEGATIVE MG/DL
HCG INTACT+B SERPL-ACNC: <0.5 MIU/ML
HCT VFR BLD AUTO: 25.7 % (ref 34–46.6)
HCT VFR BLD AUTO: 35.7 % (ref 34–46.6)
HGB BLD-MCNC: 12.4 G/DL (ref 12–15.9)
HGB BLD-MCNC: 8.6 G/DL (ref 12–15.9)
HGB UR QL STRIP.AUTO: NEGATIVE
HOLD SPECIMEN: NORMAL
HOLD SPECIMEN: NORMAL
IMM GRANULOCYTES # BLD AUTO: 0.1 10*3/MM3 (ref 0–0.05)
IMM GRANULOCYTES NFR BLD AUTO: 0.6 % (ref 0–0.5)
KETONES UR QL STRIP: NEGATIVE
LEUKOCYTE ESTERASE UR QL STRIP.AUTO: NEGATIVE
LIPASE SERPL-CCNC: 91 U/L (ref 13–60)
LYMPHOCYTES # BLD AUTO: 2.16 10*3/MM3 (ref 0.7–3.1)
LYMPHOCYTES NFR BLD AUTO: 12 % (ref 19.6–45.3)
MCH RBC QN AUTO: 32.7 PG (ref 26.6–33)
MCHC RBC AUTO-ENTMCNC: 34.7 G/DL (ref 31.5–35.7)
MCV RBC AUTO: 94.2 FL (ref 79–97)
MONOCYTES # BLD AUTO: 1.26 10*3/MM3 (ref 0.1–0.9)
MONOCYTES NFR BLD AUTO: 7 % (ref 5–12)
NEUTROPHILS NFR BLD AUTO: 14.26 10*3/MM3 (ref 1.7–7)
NEUTROPHILS NFR BLD AUTO: 78.8 % (ref 42.7–76)
NITRITE UR QL STRIP: NEGATIVE
NRBC BLD AUTO-RTO: 0 /100 WBC (ref 0–0.2)
PH UR STRIP.AUTO: 5.5 [PH] (ref 5–8)
PLATELET # BLD AUTO: 339 10*3/MM3 (ref 140–450)
PMV BLD AUTO: 9.4 FL (ref 6–12)
POTASSIUM SERPL-SCNC: 4 MMOL/L (ref 3.5–5.2)
PROT SERPL-MCNC: 6.4 G/DL (ref 6–8.5)
PROT UR QL STRIP: ABNORMAL
RBC # BLD AUTO: 3.79 10*6/MM3 (ref 3.77–5.28)
RH BLD: POSITIVE
RH BLD: POSITIVE
SODIUM SERPL-SCNC: 135 MMOL/L (ref 136–145)
SP GR UR STRIP: >1.03 (ref 1–1.03)
T&S EXPIRATION DATE: NORMAL
UROBILINOGEN UR QL STRIP: ABNORMAL
WBC NRBC COR # BLD AUTO: 18.06 10*3/MM3 (ref 3.4–10.8)
WHOLE BLOOD HOLD COAG: NORMAL
WHOLE BLOOD HOLD SPECIMEN: NORMAL

## 2024-09-27 PROCEDURE — 86901 BLOOD TYPING SEROLOGIC RH(D): CPT

## 2024-09-27 PROCEDURE — 25010000002 ALBUMIN HUMAN 5% PER 50 ML

## 2024-09-27 PROCEDURE — 25810000003 LACTATED RINGERS PER 1000 ML: Performed by: ANESTHESIOLOGY

## 2024-09-27 PROCEDURE — G0378 HOSPITAL OBSERVATION PER HR: HCPCS

## 2024-09-27 PROCEDURE — 25010000002 CEFAZOLIN PER 500 MG: Performed by: NURSE ANESTHETIST, CERTIFIED REGISTERED

## 2024-09-27 PROCEDURE — 99291 CRITICAL CARE FIRST HOUR: CPT

## 2024-09-27 PROCEDURE — 88307 TISSUE EXAM BY PATHOLOGIST: CPT | Performed by: OBSTETRICS & GYNECOLOGY

## 2024-09-27 PROCEDURE — 25010000002 HYDROMORPHONE 1 MG/ML SOLUTION

## 2024-09-27 PROCEDURE — 96374 THER/PROPH/DIAG INJ IV PUSH: CPT

## 2024-09-27 PROCEDURE — 25010000002 FENTANYL CITRATE (PF) 50 MCG/ML SOLUTION: Performed by: NURSE ANESTHETIST, CERTIFIED REGISTERED

## 2024-09-27 PROCEDURE — 88108 CYTOPATH CONCENTRATE TECH: CPT | Performed by: OBSTETRICS & GYNECOLOGY

## 2024-09-27 PROCEDURE — 36415 COLL VENOUS BLD VENIPUNCTURE: CPT

## 2024-09-27 PROCEDURE — 25010000002 BUPIVACAINE (PF) 0.25 % SOLUTION: Performed by: OBSTETRICS & GYNECOLOGY

## 2024-09-27 PROCEDURE — 85014 HEMATOCRIT: CPT

## 2024-09-27 PROCEDURE — 86901 BLOOD TYPING SEROLOGIC RH(D): CPT | Performed by: EMERGENCY MEDICINE

## 2024-09-27 PROCEDURE — 88305 TISSUE EXAM BY PATHOLOGIST: CPT | Performed by: OBSTETRICS & GYNECOLOGY

## 2024-09-27 PROCEDURE — 85018 HEMOGLOBIN: CPT

## 2024-09-27 PROCEDURE — 25010000002 MIDAZOLAM PER 1MG: Performed by: ANESTHESIOLOGY

## 2024-09-27 PROCEDURE — 86900 BLOOD TYPING SEROLOGIC ABO: CPT

## 2024-09-27 PROCEDURE — 25010000002 SUGAMMADEX 200 MG/2ML SOLUTION

## 2024-09-27 PROCEDURE — 85025 COMPLETE CBC W/AUTO DIFF WBC: CPT | Performed by: EMERGENCY MEDICINE

## 2024-09-27 PROCEDURE — 74177 CT ABD & PELVIS W/CONTRAST: CPT

## 2024-09-27 PROCEDURE — 84702 CHORIONIC GONADOTROPIN TEST: CPT | Performed by: EMERGENCY MEDICINE

## 2024-09-27 PROCEDURE — 25810000003 SODIUM CHLORIDE 0.9 % SOLUTION: Performed by: EMERGENCY MEDICINE

## 2024-09-27 PROCEDURE — 25010000002 ONDANSETRON PER 1 MG: Performed by: EMERGENCY MEDICINE

## 2024-09-27 PROCEDURE — 96375 TX/PRO/DX INJ NEW DRUG ADDON: CPT

## 2024-09-27 PROCEDURE — 25010000002 ONDANSETRON PER 1 MG: Performed by: NURSE ANESTHETIST, CERTIFIED REGISTERED

## 2024-09-27 PROCEDURE — 83690 ASSAY OF LIPASE: CPT | Performed by: EMERGENCY MEDICINE

## 2024-09-27 PROCEDURE — 25010000002 DEXAMETHASONE PER 1 MG: Performed by: NURSE ANESTHETIST, CERTIFIED REGISTERED

## 2024-09-27 PROCEDURE — P9041 ALBUMIN (HUMAN),5%, 50ML: HCPCS

## 2024-09-27 PROCEDURE — 25010000002 MORPHINE PER 10 MG: Performed by: EMERGENCY MEDICINE

## 2024-09-27 PROCEDURE — 80053 COMPREHEN METABOLIC PANEL: CPT | Performed by: EMERGENCY MEDICINE

## 2024-09-27 PROCEDURE — 96361 HYDRATE IV INFUSION ADD-ON: CPT

## 2024-09-27 PROCEDURE — 25010000002 NA FERRIC GLUC CPLX PER 12.5 MG: Performed by: OBSTETRICS & GYNECOLOGY

## 2024-09-27 PROCEDURE — 81003 URINALYSIS AUTO W/O SCOPE: CPT | Performed by: EMERGENCY MEDICINE

## 2024-09-27 PROCEDURE — 86900 BLOOD TYPING SEROLOGIC ABO: CPT | Performed by: EMERGENCY MEDICINE

## 2024-09-27 PROCEDURE — 86850 RBC ANTIBODY SCREEN: CPT | Performed by: EMERGENCY MEDICINE

## 2024-09-27 PROCEDURE — 25010000002 PROPOFOL 10 MG/ML EMULSION: Performed by: NURSE ANESTHETIST, CERTIFIED REGISTERED

## 2024-09-27 PROCEDURE — 25010000002 KETOROLAC TROMETHAMINE PER 15 MG: Performed by: OBSTETRICS & GYNECOLOGY

## 2024-09-27 PROCEDURE — 25510000001 IOPAMIDOL PER 1 ML: Performed by: EMERGENCY MEDICINE

## 2024-09-27 DEVICE — ABSORBABLE HEMOSTAT (OXIDIZED REGENERATED CELLULOSE)
Type: IMPLANTABLE DEVICE | Site: ABDOMEN | Status: FUNCTIONAL
Brand: SURGICEL NU-KNIT

## 2024-09-27 RX ORDER — ONDANSETRON 2 MG/ML
4 INJECTION INTRAMUSCULAR; INTRAVENOUS EVERY 6 HOURS PRN
Status: DISCONTINUED | OUTPATIENT
Start: 2024-09-27 | End: 2024-09-28 | Stop reason: HOSPADM

## 2024-09-27 RX ORDER — KETOROLAC TROMETHAMINE 15 MG/ML
15 INJECTION, SOLUTION INTRAMUSCULAR; INTRAVENOUS EVERY 6 HOURS
Status: COMPLETED | OUTPATIENT
Start: 2024-09-27 | End: 2024-09-28

## 2024-09-27 RX ORDER — ONDANSETRON 2 MG/ML
4 INJECTION INTRAMUSCULAR; INTRAVENOUS ONCE AS NEEDED
Status: DISCONTINUED | OUTPATIENT
Start: 2024-09-27 | End: 2024-09-27

## 2024-09-27 RX ORDER — FENTANYL CITRATE 50 UG/ML
INJECTION, SOLUTION INTRAMUSCULAR; INTRAVENOUS AS NEEDED
Status: DISCONTINUED | OUTPATIENT
Start: 2024-09-27 | End: 2024-09-27 | Stop reason: SURG

## 2024-09-27 RX ORDER — DEXMEDETOMIDINE HYDROCHLORIDE 100 UG/ML
INJECTION, SOLUTION INTRAVENOUS AS NEEDED
Status: DISCONTINUED | OUTPATIENT
Start: 2024-09-27 | End: 2024-09-27 | Stop reason: SURG

## 2024-09-27 RX ORDER — EPHEDRINE SULFATE 50 MG/ML
INJECTION INTRAVENOUS AS NEEDED
Status: DISCONTINUED | OUTPATIENT
Start: 2024-09-27 | End: 2024-09-27 | Stop reason: SURG

## 2024-09-27 RX ORDER — MEPERIDINE HYDROCHLORIDE 25 MG/ML
12.5 INJECTION INTRAMUSCULAR; INTRAVENOUS; SUBCUTANEOUS
Status: DISCONTINUED | OUTPATIENT
Start: 2024-09-27 | End: 2024-09-27

## 2024-09-27 RX ORDER — IOPAMIDOL 755 MG/ML
100 INJECTION, SOLUTION INTRAVASCULAR
Status: COMPLETED | OUTPATIENT
Start: 2024-09-27 | End: 2024-09-27

## 2024-09-27 RX ORDER — ONDANSETRON 2 MG/ML
INJECTION INTRAMUSCULAR; INTRAVENOUS AS NEEDED
Status: DISCONTINUED | OUTPATIENT
Start: 2024-09-27 | End: 2024-09-27 | Stop reason: SURG

## 2024-09-27 RX ORDER — PROPOFOL 10 MG/ML
VIAL (ML) INTRAVENOUS AS NEEDED
Status: DISCONTINUED | OUTPATIENT
Start: 2024-09-27 | End: 2024-09-27 | Stop reason: SURG

## 2024-09-27 RX ORDER — SODIUM CHLORIDE 0.9 % (FLUSH) 0.9 %
10 SYRINGE (ML) INJECTION EVERY 12 HOURS SCHEDULED
Status: DISCONTINUED | OUTPATIENT
Start: 2024-09-27 | End: 2024-09-27 | Stop reason: HOSPADM

## 2024-09-27 RX ORDER — PHENAZOPYRIDINE HYDROCHLORIDE 200 MG/1
200 TABLET, FILM COATED ORAL ONCE
Status: COMPLETED | OUTPATIENT
Start: 2024-09-27 | End: 2024-09-27

## 2024-09-27 RX ORDER — ALBUTEROL SULFATE 90 UG/1
INHALANT RESPIRATORY (INHALATION) AS NEEDED
Status: DISCONTINUED | OUTPATIENT
Start: 2024-09-27 | End: 2024-09-27 | Stop reason: SURG

## 2024-09-27 RX ORDER — PSEUDOEPHEDRINE HCL 30 MG
100 TABLET ORAL DAILY PRN
Qty: 14 CAPSULE | Refills: 0 | Status: CANCELLED | OUTPATIENT
Start: 2024-09-27

## 2024-09-27 RX ORDER — OXYCODONE HYDROCHLORIDE 5 MG/1
5 TABLET ORAL
Status: DISCONTINUED | OUTPATIENT
Start: 2024-09-27 | End: 2024-09-27

## 2024-09-27 RX ORDER — ACETAMINOPHEN 500 MG
1000 TABLET ORAL ONCE
Status: DISCONTINUED | OUTPATIENT
Start: 2024-09-27 | End: 2024-09-27 | Stop reason: HOSPADM

## 2024-09-27 RX ORDER — SODIUM CHLORIDE 0.9 % (FLUSH) 0.9 %
10 SYRINGE (ML) INJECTION AS NEEDED
Status: DISCONTINUED | OUTPATIENT
Start: 2024-09-27 | End: 2024-09-27

## 2024-09-27 RX ORDER — DIPHENHYDRAMINE HCL 25 MG
25 CAPSULE ORAL EVERY 6 HOURS PRN
Status: DISCONTINUED | OUTPATIENT
Start: 2024-09-27 | End: 2024-09-28 | Stop reason: HOSPADM

## 2024-09-27 RX ORDER — PROMETHAZINE HYDROCHLORIDE 25 MG/1
25 SUPPOSITORY RECTAL ONCE AS NEEDED
Status: DISCONTINUED | OUTPATIENT
Start: 2024-09-27 | End: 2024-09-27

## 2024-09-27 RX ORDER — DEXTROSE MONOHYDRATE, SODIUM CHLORIDE, AND POTASSIUM CHLORIDE 50; 1.49; 4.5 G/1000ML; G/1000ML; G/1000ML
100 INJECTION, SOLUTION INTRAVENOUS CONTINUOUS
Status: DISCONTINUED | OUTPATIENT
Start: 2024-09-27 | End: 2024-09-28 | Stop reason: HOSPADM

## 2024-09-27 RX ORDER — MIDAZOLAM HYDROCHLORIDE 2 MG/2ML
2 INJECTION, SOLUTION INTRAMUSCULAR; INTRAVENOUS ONCE
Status: COMPLETED | OUTPATIENT
Start: 2024-09-27 | End: 2024-09-27

## 2024-09-27 RX ORDER — DOCUSATE SODIUM 100 MG/1
100 CAPSULE, LIQUID FILLED ORAL DAILY PRN
Status: DISCONTINUED | OUTPATIENT
Start: 2024-09-27 | End: 2024-09-28 | Stop reason: HOSPADM

## 2024-09-27 RX ORDER — ALBUMIN, HUMAN INJ 5% 5 %
SOLUTION INTRAVENOUS CONTINUOUS PRN
Status: DISCONTINUED | OUTPATIENT
Start: 2024-09-27 | End: 2024-09-27 | Stop reason: SURG

## 2024-09-27 RX ORDER — PROMETHAZINE HYDROCHLORIDE 12.5 MG/1
25 TABLET ORAL ONCE AS NEEDED
Status: DISCONTINUED | OUTPATIENT
Start: 2024-09-27 | End: 2024-09-27

## 2024-09-27 RX ORDER — BUPIVACAINE HYDROCHLORIDE 2.5 MG/ML
INJECTION, SOLUTION EPIDURAL; INFILTRATION; INTRACAUDAL AS NEEDED
Status: DISCONTINUED | OUTPATIENT
Start: 2024-09-27 | End: 2024-09-27 | Stop reason: HOSPADM

## 2024-09-27 RX ORDER — LIDOCAINE HYDROCHLORIDE 20 MG/ML
INJECTION, SOLUTION EPIDURAL; INFILTRATION; INTRACAUDAL; PERINEURAL AS NEEDED
Status: DISCONTINUED | OUTPATIENT
Start: 2024-09-27 | End: 2024-09-27 | Stop reason: SURG

## 2024-09-27 RX ORDER — SCOLOPAMINE TRANSDERMAL SYSTEM 1 MG/1
1 PATCH, EXTENDED RELEASE TRANSDERMAL CONTINUOUS
Status: DISCONTINUED | OUTPATIENT
Start: 2024-09-27 | End: 2024-09-27

## 2024-09-27 RX ORDER — SODIUM CHLORIDE 0.9 % (FLUSH) 0.9 %
10 SYRINGE (ML) INJECTION AS NEEDED
Status: DISCONTINUED | OUTPATIENT
Start: 2024-09-27 | End: 2024-09-27 | Stop reason: HOSPADM

## 2024-09-27 RX ORDER — ROCURONIUM BROMIDE 10 MG/ML
INJECTION, SOLUTION INTRAVENOUS AS NEEDED
Status: DISCONTINUED | OUTPATIENT
Start: 2024-09-27 | End: 2024-09-27 | Stop reason: SURG

## 2024-09-27 RX ORDER — ONDANSETRON 2 MG/ML
4 INJECTION INTRAMUSCULAR; INTRAVENOUS EVERY 6 HOURS PRN
Status: DISCONTINUED | OUTPATIENT
Start: 2024-09-27 | End: 2024-09-27 | Stop reason: HOSPADM

## 2024-09-27 RX ORDER — SODIUM CHLORIDE 9 MG/ML
40 INJECTION, SOLUTION INTRAVENOUS AS NEEDED
Status: DISCONTINUED | OUTPATIENT
Start: 2024-09-27 | End: 2024-09-27 | Stop reason: HOSPADM

## 2024-09-27 RX ORDER — PHENYLEPHRINE HCL IN 0.9% NACL 1 MG/10 ML
SYRINGE (ML) INTRAVENOUS AS NEEDED
Status: DISCONTINUED | OUTPATIENT
Start: 2024-09-27 | End: 2024-09-27 | Stop reason: SURG

## 2024-09-27 RX ORDER — SODIUM CHLORIDE, SODIUM LACTATE, POTASSIUM CHLORIDE, CALCIUM CHLORIDE 600; 310; 30; 20 MG/100ML; MG/100ML; MG/100ML; MG/100ML
9 INJECTION, SOLUTION INTRAVENOUS CONTINUOUS PRN
Status: DISCONTINUED | OUTPATIENT
Start: 2024-09-27 | End: 2024-09-27

## 2024-09-27 RX ORDER — ONDANSETRON 2 MG/ML
4 INJECTION INTRAMUSCULAR; INTRAVENOUS ONCE
Status: COMPLETED | OUTPATIENT
Start: 2024-09-27 | End: 2024-09-27

## 2024-09-27 RX ORDER — DEXAMETHASONE SODIUM PHOSPHATE 4 MG/ML
INJECTION, SOLUTION INTRA-ARTICULAR; INTRALESIONAL; INTRAMUSCULAR; INTRAVENOUS; SOFT TISSUE AS NEEDED
Status: DISCONTINUED | OUTPATIENT
Start: 2024-09-27 | End: 2024-09-27 | Stop reason: SURG

## 2024-09-27 RX ORDER — ALBUTEROL SULFATE 0.83 MG/ML
SOLUTION RESPIRATORY (INHALATION) AS NEEDED
Status: DISCONTINUED | OUTPATIENT
Start: 2024-09-27 | End: 2024-09-27 | Stop reason: SURG

## 2024-09-27 RX ORDER — ALUMINA, MAGNESIA, AND SIMETHICONE 2400; 2400; 240 MG/30ML; MG/30ML; MG/30ML
15 SUSPENSION ORAL EVERY 6 HOURS PRN
Status: DISCONTINUED | OUTPATIENT
Start: 2024-09-27 | End: 2024-09-28 | Stop reason: HOSPADM

## 2024-09-27 RX ORDER — ACETAMINOPHEN 500 MG
1000 TABLET ORAL ONCE
Status: COMPLETED | OUTPATIENT
Start: 2024-09-27 | End: 2024-09-27

## 2024-09-27 RX ORDER — SCOLOPAMINE TRANSDERMAL SYSTEM 1 MG/1
1 PATCH, EXTENDED RELEASE TRANSDERMAL ONCE
Status: DISCONTINUED | OUTPATIENT
Start: 2024-09-27 | End: 2024-09-27 | Stop reason: HOSPADM

## 2024-09-27 RX ORDER — CEFAZOLIN SODIUM 1 G/3ML
INJECTION, POWDER, FOR SOLUTION INTRAMUSCULAR; INTRAVENOUS AS NEEDED
Status: DISCONTINUED | OUTPATIENT
Start: 2024-09-27 | End: 2024-09-27 | Stop reason: SURG

## 2024-09-27 RX ORDER — DEXTROAMPHETAMINE SACCHARATE, AMPHETAMINE ASPARTATE MONOHYDRATE, DEXTROAMPHETAMINE SULFATE AND AMPHETAMINE SULFATE 5; 5; 5; 5 MG/1; MG/1; MG/1; MG/1
20 CAPSULE, EXTENDED RELEASE ORAL DAILY
COMMUNITY
Start: 2024-09-05

## 2024-09-27 RX ORDER — ONDANSETRON 4 MG/1
4 TABLET, ORALLY DISINTEGRATING ORAL EVERY 6 HOURS PRN
Status: DISCONTINUED | OUTPATIENT
Start: 2024-09-27 | End: 2024-09-28 | Stop reason: HOSPADM

## 2024-09-27 RX ORDER — HYDROCODONE BITARTRATE AND ACETAMINOPHEN 7.5; 325 MG/1; MG/1
2 TABLET ORAL EVERY 4 HOURS PRN
Status: DISCONTINUED | OUTPATIENT
Start: 2024-09-27 | End: 2024-09-28 | Stop reason: HOSPADM

## 2024-09-27 RX ADMIN — SCOPALAMINE 1 PATCH: 1 PATCH, EXTENDED RELEASE TRANSDERMAL at 14:43

## 2024-09-27 RX ADMIN — ROCURONIUM BROMIDE 50 MG: 10 INJECTION, SOLUTION INTRAVENOUS at 15:04

## 2024-09-27 RX ADMIN — SODIUM CHLORIDE 1000 ML: 9 INJECTION, SOLUTION INTRAVENOUS at 10:58

## 2024-09-27 RX ADMIN — DEXAMETHASONE SODIUM PHOSPHATE 4 MG: 4 INJECTION, SOLUTION INTRAMUSCULAR; INTRAVENOUS at 15:15

## 2024-09-27 RX ADMIN — ROCURONIUM BROMIDE 10 MG: 10 INJECTION, SOLUTION INTRAVENOUS at 16:31

## 2024-09-27 RX ADMIN — POTASSIUM CHLORIDE, DEXTROSE MONOHYDRATE AND SODIUM CHLORIDE 100 ML/HR: 150; 5; 450 INJECTION, SOLUTION INTRAVENOUS at 19:48

## 2024-09-27 RX ADMIN — Medication 100 MCG: at 17:53

## 2024-09-27 RX ADMIN — DEXMEDETOMIDINE HYDROCHLORIDE 8 MCG: 100 INJECTION, SOLUTION, CONCENTRATE INTRAVENOUS at 16:28

## 2024-09-27 RX ADMIN — SODIUM CHLORIDE, POTASSIUM CHLORIDE, SODIUM LACTATE AND CALCIUM CHLORIDE 9 ML/HR: 600; 310; 30; 20 INJECTION, SOLUTION INTRAVENOUS at 14:52

## 2024-09-27 RX ADMIN — ONDANSETRON HYDROCHLORIDE 4 MG: 2 SOLUTION INTRAMUSCULAR; INTRAVENOUS at 15:15

## 2024-09-27 RX ADMIN — IOPAMIDOL 100 ML: 755 INJECTION, SOLUTION INTRAVENOUS at 11:39

## 2024-09-27 RX ADMIN — SUGAMMADEX 200 MG: 100 INJECTION, SOLUTION INTRAVENOUS at 18:02

## 2024-09-27 RX ADMIN — MIDAZOLAM HYDROCHLORIDE 2 MG: 1 INJECTION, SOLUTION INTRAMUSCULAR; INTRAVENOUS at 14:54

## 2024-09-27 RX ADMIN — SUGAMMADEX 200 MG: 100 INJECTION, SOLUTION INTRAVENOUS at 17:36

## 2024-09-27 RX ADMIN — PROPOFOL 200 MG: 10 INJECTION, EMULSION INTRAVENOUS at 15:04

## 2024-09-27 RX ADMIN — SODIUM CHLORIDE, POTASSIUM CHLORIDE, SODIUM LACTATE AND CALCIUM CHLORIDE: 600; 310; 30; 20 INJECTION, SOLUTION INTRAVENOUS at 16:05

## 2024-09-27 RX ADMIN — SODIUM CHLORIDE 125 MG: 9 INJECTION, SOLUTION INTRAVENOUS at 19:54

## 2024-09-27 RX ADMIN — SODIUM CHLORIDE, POTASSIUM CHLORIDE, SODIUM LACTATE AND CALCIUM CHLORIDE: 600; 310; 30; 20 INJECTION, SOLUTION INTRAVENOUS at 17:27

## 2024-09-27 RX ADMIN — ONDANSETRON 4 MG: 2 INJECTION INTRAMUSCULAR; INTRAVENOUS at 11:05

## 2024-09-27 RX ADMIN — DEXMEDETOMIDINE HYDROCHLORIDE 20 MCG: 100 INJECTION, SOLUTION, CONCENTRATE INTRAVENOUS at 15:08

## 2024-09-27 RX ADMIN — KETOROLAC TROMETHAMINE 15 MG: 15 INJECTION, SOLUTION INTRAMUSCULAR; INTRAVENOUS at 19:54

## 2024-09-27 RX ADMIN — DEXMEDETOMIDINE HYDROCHLORIDE 8 MCG: 100 INJECTION, SOLUTION, CONCENTRATE INTRAVENOUS at 16:31

## 2024-09-27 RX ADMIN — CEFAZOLIN 2 G: 1 INJECTION, POWDER, FOR SOLUTION INTRAMUSCULAR; INTRAVENOUS at 15:02

## 2024-09-27 RX ADMIN — ALBUMIN (HUMAN): 12.5 INJECTION, SOLUTION INTRAVENOUS at 18:03

## 2024-09-27 RX ADMIN — EPHEDRINE SULFATE 10 MG: 50 INJECTION INTRAVENOUS at 18:05

## 2024-09-27 RX ADMIN — ACETAMINOPHEN 1000 MG: 500 TABLET ORAL at 14:43

## 2024-09-27 RX ADMIN — Medication 150 MCG: at 17:58

## 2024-09-27 RX ADMIN — HYDROMORPHONE HYDROCHLORIDE 0.5 MG: 1 INJECTION, SOLUTION INTRAMUSCULAR; INTRAVENOUS; SUBCUTANEOUS at 16:48

## 2024-09-27 RX ADMIN — PROPOFOL 100 MG: 10 INJECTION, EMULSION INTRAVENOUS at 17:35

## 2024-09-27 RX ADMIN — EPHEDRINE SULFATE 10 MG: 50 INJECTION INTRAVENOUS at 18:08

## 2024-09-27 RX ADMIN — ROCURONIUM BROMIDE 10 MG: 10 INJECTION, SOLUTION INTRAVENOUS at 16:01

## 2024-09-27 RX ADMIN — LIDOCAINE HYDROCHLORIDE 80 MG: 20 INJECTION, SOLUTION EPIDURAL; INFILTRATION; INTRACAUDAL; PERINEURAL at 15:04

## 2024-09-27 RX ADMIN — Medication 100 MCG: at 17:50

## 2024-09-27 RX ADMIN — ALBUTEROL SULFATE 6 PUFF: 90 AEROSOL, METERED RESPIRATORY (INHALATION) at 17:51

## 2024-09-27 RX ADMIN — MORPHINE SULFATE 4 MG: 4 INJECTION, SOLUTION INTRAMUSCULAR; INTRAVENOUS at 11:05

## 2024-09-27 RX ADMIN — ALBUMIN (HUMAN): 12.5 INJECTION, SOLUTION INTRAVENOUS at 17:01

## 2024-09-27 RX ADMIN — ALBUMIN (HUMAN): 12.5 INJECTION, SOLUTION INTRAVENOUS at 16:44

## 2024-09-27 RX ADMIN — HYDROMORPHONE HYDROCHLORIDE 0.5 MG: 1 INJECTION, SOLUTION INTRAMUSCULAR; INTRAVENOUS; SUBCUTANEOUS at 16:25

## 2024-09-27 RX ADMIN — PHENAZOPYRIDINE HYDROCHLORIDE 200 MG: 200 TABLET ORAL at 14:54

## 2024-09-27 RX ADMIN — FENTANYL CITRATE 100 MCG: 50 INJECTION, SOLUTION INTRAMUSCULAR; INTRAVENOUS at 15:04

## 2024-09-27 RX ADMIN — ALBUMIN (HUMAN): 12.5 INJECTION, SOLUTION INTRAVENOUS at 17:17

## 2024-09-27 RX ADMIN — ALBUTEROL SULFATE 2.5 MG: 2.5 SOLUTION RESPIRATORY (INHALATION) at 17:53

## 2024-09-27 NOTE — ED PROVIDER NOTES
Time: 10:31 AM EDT  Date of encounter:  9/27/2024  Independent Historian/Clinical History and Information was obtained by:   Patient    History is limited by: N/A    Chief Complaint: Abdominal pain      History of Present Illness:  Patient is a 24 y.o. year old female who presents to the emergency department for evaluation of abdominal pain.  Reports that she has been having nominal pain since yesterday.  Does have a history of previous ovarian cyst with removal.  States that she started having abdominal pain in all of her abdomen that, radiates up to her chest.  Denies nausea or vomiting.  Does report that she had some blood on her toilet paper but this is happened to her multiple times.  Denies any dysuria, vaginal discharge, vaginal bleeding.  Last menstrual period was 1 month ago.  No other complaints this time.      Patient Care Team  Primary Care Provider: Padmini Hernández APRN    Past Medical History:     Allergies   Allergen Reactions    Oxycodone-Acetaminophen Itching     Past Medical History:   Diagnosis Date    Adnexal cyst 03/31/2015    LEFT     Bladder problem 03/08/2023    IIH (idiopathic intracranial hypertension) 10/10/2018    THE PATIENTS CLINICAL HISTORY AND EXAM ARE MOST CONSISTENT WITH IDIOPATHIC INTRACRANIAL HYPERTENSION. SHE STARTED HER FIRST DOSE OF DIAMOX TODAY. I WILL PURSUE A CT HEAD. I WILL PURSUE AN UGENT LUMBAR PUNCTURE. I WILL CHECK LABS. I WILL PURSUE AN MRV. I DID DISCUSS PURSUING A LUMBAR PUNCTURE TODAY BUT THE PATIENT DEFFERED. WE WILL ATTEMPT TO  ARRANGE THIS TOMORROW. I INSTRUCTED THE PATIENT TO SEE     Mass of uterine adnexa 03/31/2015    Post lumbar puncture headache 12/10/2016    Renal calculus 03/08/2023    Ureteral calculus 03/31/2015    RIGHT     Past Surgical History:   Procedure Laterality Date    HX OVARIAN CYSTECTOMY  2015    LUMBAR PUNCTURE      TONSILLECTOMY       Family History   Problem Relation Age of Onset    Diabetes Mother     Ulcerative colitis Father      "Crohn's disease Father     Arthritis Father     Urolithiasis Other        Home Medications:  Prior to Admission medications    Medication Sig Start Date End Date Taking? Authorizing Provider   Levonorgestrel (Liletta, 52 MG,) 19.5 MCG/DAY intrauterine device     Emergency, Nurse Issa, RN        Social History:   Social History     Tobacco Use    Smoking status: Every Day     Current packs/day: 0.75     Types: Cigarettes    Smokeless tobacco: Never   Vaping Use    Vaping status: Never Used   Substance Use Topics    Alcohol use: Yes     Alcohol/week: 5.0 standard drinks of alcohol     Types: 5 Cans of beer per week     Comment: occ    Drug use: Yes     Frequency: 7.0 times per week     Types: Marijuana         Review of Systems:  Review of Systems     Physical Exam:  /67 (BP Location: Right arm, Patient Position: Lying)   Pulse 92   Temp 97.9 °F (36.6 °C) (Oral)   Resp 18   Ht 157.5 cm (62\")   Wt 71.1 kg (156 lb 12 oz)   SpO2 98%   BMI 28.67 kg/m²     Physical Exam  Vitals and nursing note reviewed.   Constitutional:       Appearance: Normal appearance.   HENT:      Head: Normocephalic and atraumatic.   Eyes:      General: No scleral icterus.  Cardiovascular:      Rate and Rhythm: Normal rate and regular rhythm.      Heart sounds: Normal heart sounds.   Pulmonary:      Effort: Pulmonary effort is normal.      Breath sounds: Normal breath sounds.   Abdominal:      Palpations: Abdomen is soft.      Tenderness: There is abdominal tenderness.      Comments: Worse in the lower abdomen   Musculoskeletal:         General: Normal range of motion.      Cervical back: Normal range of motion.   Skin:     Findings: No rash.   Neurological:      General: No focal deficit present.      Mental Status: She is alert.                  Procedures:  Procedures      Medical Decision Making:      Comorbidities that affect care:    Ovarian cyst, kidney stones    External Notes reviewed:  Reviewed note from 7/1/2024        The " following orders were placed and all results were independently analyzed by me:  Orders Placed This Encounter   Procedures    CT Abdomen Pelvis With Contrast    [place] Draw    Comprehensive Metabolic Panel    Lipase    Urinalysis With Microscopic If Indicated (No Culture) - Urine, Clean Catch    hCG, Quantitative, Pregnancy    CBC Auto Differential    Pregnancy, Urine - Urine, Clean Catch    Hemoglobin & Hematocrit, Blood    NPO Diet NPO Type: Strict NPO    Undress & Gown    Verify Informed Consent    Maintain Sequential Compression Device    Saline Lock & Maintain IV Access    Remove Scopolamine Patch 24 Hours After Application    Continuous Pulse Oximetry    Code Status and Medical Interventions: CPR (Attempt to Resuscitate); Full Support    IP Consult to OB GYN    Type & Screen    Prepare RBC, 2 Units    ABO RH Specimen Verification    Insert Peripheral IV    CBC & Differential    Green Top (Gel)    Lavender Top    Gold Top - SST    Light Blue Top       Medications Given in the Emergency Department:  Medications   sodium chloride 0.9 % flush 10 mL ( Intravenous MAR Hold 9/27/24 1427)   sodium chloride 0.9 % bolus 1,000 mL ( Intravenous MAR Hold 9/27/24 1427)   scopolamine patch 1 mg/72 hr (1 patch Transdermal Medication Applied 9/27/24 1443)   lactated ringers infusion ( Intravenous New Bag 9/27/24 1727)   bupivacaine (PF) (MARCAINE) 0.25 % injection (30 mL Infiltration Given 9/27/24 1714)   sodium chloride 0.9 % bolus 1,000 mL (0 mL Intravenous Stopped 9/27/24 1315)   morphine injection 4 mg (4 mg Intravenous Given 9/27/24 1105)   ondansetron (ZOFRAN) injection 4 mg (4 mg Intravenous Given 9/27/24 1105)   iopamidol (ISOVUE-370) 76 % injection 100 mL (100 mL Intravenous Given 9/27/24 1139)   phenazopyridine (PYRIDIUM) tablet 200 mg (200 mg Oral Given 9/27/24 1454)   acetaminophen (TYLENOL) tablet 1,000 mg (1,000 mg Oral Given 9/27/24 1443)   Midazolam HCl (PF) (VERSED) injection 2 mg (2 mg Intravenous Given  9/27/24 1454)        ED Course:    ED Course as of 09/27/24 1743   Fri Sep 27, 2024   1206 Spoke with Dr. Koroma who will come evaluate the patient.  [MA]   1242 Spoke Dr. Koroma who recommended taking the patient the OR.  Will go to the OR. [MA]      ED Course User Index  [MA] Bam Bliss MD       Labs:    Lab Results (last 24 hours)       Procedure Component Value Units Date/Time    CBC & Differential [[id number]]  (Abnormal) Collected: 09/27/24 1057    Specimen: Blood Updated: 09/27/24 1106    Narrative:      The following orders were created for panel order CBC & Differential.  Procedure                               Abnormality         Status                     ---------                               -----------         ------                     CBC Auto Differential[[id number]]        Abnormal            Final result                 Please view results for these tests on the individual orders.    Comprehensive Metabolic Panel [[id number]]  (Abnormal) Collected: 09/27/24 1057    Specimen: Blood Updated: 09/27/24 1125     Glucose 117 mg/dL      BUN 14 mg/dL      Creatinine 0.64 mg/dL      Sodium 135 mmol/L      Potassium 4.0 mmol/L      Chloride 103 mmol/L      CO2 21.1 mmol/L      Calcium 8.6 mg/dL      Total Protein 6.4 g/dL      Albumin 3.9 g/dL      ALT (SGPT) 21 U/L      AST (SGOT) 16 U/L      Alkaline Phosphatase 66 U/L      Total Bilirubin 0.6 mg/dL      Globulin 2.5 gm/dL      A/G Ratio 1.6 g/dL      BUN/Creatinine Ratio 21.9     Anion Gap 10.9 mmol/L      eGFR 126.0 mL/min/1.73     Narrative:      GFR Normal >60  Chronic Kidney Disease <60  Kidney Failure <15      Lipase [[id number]]  (Abnormal) Collected: 09/27/24 1057    Specimen: Blood Updated: 09/27/24 1125     Lipase 91 U/L     Urinalysis With Microscopic If Indicated (No Culture) - Urine, Clean Catch [[id number]]  (Abnormal) Collected: 09/27/24 1057    Specimen: Urine, Clean Catch Updated: 09/27/24 1111     Color, UA Yellow     Appearance, UA  Turbid     pH, UA 5.5     Specific Gravity, UA >1.030     Glucose, UA Negative     Ketones, UA Negative     Bilirubin, UA Negative     Blood, UA Negative     Protein, UA Trace     Leuk Esterase, UA Negative     Nitrite, UA Negative     Urobilinogen, UA 1.0 E.U./dL    Narrative:      Urine microscopic not indicated.    hCG, Quantitative, Pregnancy [[id number]] Collected: 09/27/24 1057    Specimen: Blood Updated: 09/27/24 1123     HCG Quantitative <0.50 mIU/mL     Narrative:      HCG Ranges by Gestational Age    Females - non-pregnant premenopausal   </= 1mIU/mL HCG  Females - postmenopausal               </= 7mIU/mL HCG    3 Weeks       5.4   -      72 mIU/mL  4 Weeks      10.2   -     708 mIU/mL  5 Weeks       217   -   8,245 mIU/mL  6 Weeks       152   -  32,177 mIU/mL  7 Weeks     4,059   - 153,767 mIU/mL  8 Weeks    31,366   - 149,094 mIU/mL  9 Weeks    59,109   - 135,901 mIU/mL  10 Weeks   44,186   - 170,409 mIU/mL  12 Weeks   27,107   - 201,615 mIU/mL  14 Weeks   24,302   -  93,646 mIU/mL  15 Weeks   12,540   -  69,747 mIU/mL  16 Weeks    8,904   -  55,332 mIU/mL  17 Weeks    8,240   -  51,793 mIU/mL  18 Weeks    9,649   -  55,271 mIU/mL      CBC Auto Differential [[id number]]  (Abnormal) Collected: 09/27/24 1057    Specimen: Blood Updated: 09/27/24 1106     WBC 18.06 10*3/mm3      RBC 3.79 10*6/mm3      Hemoglobin 12.4 g/dL      Hematocrit 35.7 %      MCV 94.2 fL      MCH 32.7 pg      MCHC 34.7 g/dL      RDW 13.1 %      RDW-SD 45.1 fl      MPV 9.4 fL      Platelets 339 10*3/mm3      Neutrophil % 78.8 %      Lymphocyte % 12.0 %      Monocyte % 7.0 %      Eosinophil % 1.0 %      Basophil % 0.6 %      Immature Grans % 0.6 %      Neutrophils, Absolute 14.26 10*3/mm3      Lymphocytes, Absolute 2.16 10*3/mm3      Monocytes, Absolute 1.26 10*3/mm3      Eosinophils, Absolute 0.18 10*3/mm3      Basophils, Absolute 0.10 10*3/mm3      Immature Grans, Absolute 0.10 10*3/mm3      nRBC 0.0 /100 WBC              Imaging:    CT  Abdomen Pelvis With Contrast    Addendum Date: 9/27/2024    ADDENDUM #1 ADDENDUM: The hemorrhagic/soft tissue density within the pelvis measures up to 12 x 10 x 9 cm. It is unclear whether this is hemorrhagic clot as mentioned above or combination of clot and soft tissue from the right adnexa. Ultrasound would be useful for further characterization if the patient is hemodynamically stable Electronically Signed: Shaq Hatfield MD  9/27/2024 12:17 PM EDT  Workstation ID: [id number] ORIGINAL REPORT: CT ABDOMEN PELVIS W CONTRAST Date of Exam: 9/27/2024 11:33 AM EDT Indication: abdominal pain. Comparison: 3/30/2018 Technique: Axial CT images were obtained of the abdomen and pelvis after the uneventful intravenous administration of iodinated contrast. Reconstructed coronal and sagittal images were also obtained. Automated exposure control and iterative construction methods were used. FINDINGS: Abdomen/Pelvis: Lower Chest: Limited image of the lower chest is grossly unremarkable in appearance. No free air is noted below the diaphragm. There is a moderate to large amount of increased density within the abdomen and pelvis. This is more prominent in density within the pelvis Organs: The liver, gallbladder, pancreas, spleen, kidneys and adrenal glands appear unremarkable. GI/Bowel: The stomach and small bowel demonstrate no definite acute abnormality. Colon demonstrates diverticulosis. Evaluation of the colon is somewhat limited by incomplete distention and surrounding fluid. Appendix is unable to be visualized Pelvis: High density fluid surrounds an abnormal lesion within the right adnexa containing fat soft tissue and calcification. This lesion measures at least 3.3 x 4.6 cm and may be larger. Density of material within the pelvis is not simple fluid density with some more focal areas surrounding the adnexa which could represent increased soft tissue or hemorrhagic clot. The uterus has an IUD in place. Left ovary not well  seen. The bladder is decompressed Peritoneum/Retroperitoneum: The aorta is normal in caliber. There is no suspicious retroperitoneal adenopathy Bones/Soft Tissues: No destructive bone lesion. IMPRESSION: 1. Moderate to large amount of high density fluid within the abdomen and pelvis compatible with hemoperitoneum 2. 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 This measures at least 3.6 x 4.6 cm in size. Increased density surrounding this may represent hemorrhagic clot or a component of soft tissue. 3. There is an IUD device in place within the uterus which appears to be grossly unremarkable 4. More focal dense material within the pelvis may represent focal hemorrhagic clot and/or additional soft tissue associated with the right ovary. Per ordering provider patient has a negative pregnancy test. Findings may represent rupture of a hemorrhagic cyst. Torsion of abnormal right adnexa is not excluded. Consultation with OB/GYN for further management is recommended. Findings were discussed with Dr. Ayon at the time of interpretation. Electronically Signed: Shaq Hatfield MD  9/27/2024 12:06 PM EDT  Workstation ID: [id number]    Result Date: 9/27/2024  CT ABDOMEN PELVIS W CONTRAST Date of Exam: 9/27/2024 11:33 AM EDT Indication: abdominal pain. Comparison: 3/30/2018 Technique: Axial CT images were obtained of the abdomen and pelvis after the uneventful intravenous administration of iodinated contrast. Reconstructed coronal and sagittal images were also obtained. Automated exposure control and iterative construction methods were used. FINDINGS: Abdomen/Pelvis: Lower Chest: Limited image of the lower chest is grossly unremarkable in appearance. No free air is noted below the diaphragm. There is a moderate to large amount of increased density within the abdomen and pelvis. This is more prominent in density within  the pelvis Organs: The liver, gallbladder, pancreas, spleen, kidneys and adrenal glands appear unremarkable. GI/Bowel: The stomach and small bowel demonstrate no definite acute abnormality. Colon demonstrates diverticulosis. Evaluation of the colon is somewhat limited by incomplete distention and surrounding fluid. Appendix is unable to be visualized Pelvis: High density fluid surrounds an abnormal lesion within the right adnexa containing fat soft tissue and calcification. This lesion measures at least 3.3 x 4.6 cm and may be larger. Density of material within the pelvis is not simple fluid density with some more focal areas surrounding the adnexa which could represent increased soft tissue or hemorrhagic clot. The uterus has an IUD in place. Left ovary not well seen. The bladder is decompressed Peritoneum/Retroperitoneum: The aorta is normal in caliber. There is no suspicious retroperitoneal adenopathy Bones/Soft Tissues: No destructive bone lesion.     1. Moderate to large amount of high density fluid within the abdomen and pelvis compatible with hemoperitoneum 2. 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 This measures at least 3.6 x 4.6 cm in size. Increased density surrounding this may represent hemorrhagic clot or a component of soft tissue. 3. There is an IUD device in place within the uterus which appears to be grossly unremarkable 4. More focal dense material within the pelvis may represent focal hemorrhagic clot and/or additional soft tissue associated with the right ovary. Per ordering provider patient has a negative pregnancy test. Findings may represent rupture of a hemorrhagic cyst. Torsion of abnormal right adnexa is not excluded. Consultation with OB/GYN for further management is recommended. Findings were discussed with Dr. Ayon at the time of interpretation. Electronically Signed:  Shaq Hatfield MD  9/27/2024 12:06 PM EDT  Workstation ID: [id number]       Differential Diagnosis and Discussion:    Abdominal Pain: Based on the patient's signs and symptoms, I considered abdominal aortic aneurysm, small bowel obstruction, pancreatitis, acute cholecystitis, acute appendecitis, peptic ulcer disease, gastritis, colitis, endocrine disorders, irritable bowel syndrome and other differential diagnosis an etiology of the patient's abdominal pain.    All labs were reviewed and interpreted by me.  CT scan radiology impression was interpreted by me.    MDM     Patient is a 24-year-old female who presents with complaints of abdominal pain.  Found to have blood within her abdomen.  Appears to be likely from a ruptured cyst.  OB came evaluated the patient.  Will take to the OR.  Will admit to the hospital further workup management.      Total Critical Care time of 33 minutes. Total critical care time documented does not include time spent on separately billed procedures for services of nurses or physician assistants. I personally saw and examined the patient. I have reviewed all diagnostic interpretations and treatment plans as written. I was present for the key portions of any procedures performed and the inclusive time noted in any critical care statement. Critical care time includes patient management by me, time spent at the patients bedside,  time to review lab and imaging results, discussing patient care, documentation in the medical record, and time spent with family or caregiver.          Patient Care Considerations:          Consultants/Shared Management Plan:    Consultant: I have discussed the case with Dr. Koroma who states will take the patient to the OR    Social Determinants of Health:          Disposition and Care Coordination:    Send to OR/Endoscopy        Final diagnoses:   Hemoperitoneum   Abdominal pain, unspecified abdominal location   Cyst of ovary, unspecified laterality        ED  Disposition       ED Disposition   Send to OR    Condition   --    Comment   --               This medical record created using voice recognition software.             Bam Bliss MD  09/27/24 4933

## 2024-09-27 NOTE — INTERVAL H&P NOTE
H&P updated. The patient was examined and findings are consistent with history and physical.  Chart has been reviewed including laboratory and imaging results.  Patient has been informed of the indications alternatives and benefits and risk of surgery .  She was informed that risk include loss of the ovary on the right side, hemorrhage, blood transfusion, damage to surrounding organs such as bowel bladder blood vessels, open procedure, and DVT.  She has had the opportunity to ask questions.  She verbalizes her understanding as well as her willingness to proceed and accept the aforementioned risk.

## 2024-09-27 NOTE — OP NOTE
Subjective     Date of Service:  09/27/24  Time of Service:  18:19 EDT    Surgical Staff: Surgeons and Role:     * Rachelle Marroquin MD - Primary   Additional Staff: Yasmine Gusman RN, surgical tech   Pre-operative diagnosis(es): Pre-Op Diagnosis Codes:     * Hemoperitoneum [K66.1]     * Right ovarian cyst [N83.201]     Post-operative diagnosis(es): Post-Op Diagnosis Codes:     * Hemoperitoneum [K66.1]     * Right ovarian cyst [N83.201]   Procedure(s): Procedure(s):  DIAGNOSTIC LAPAROSCOPY, RIGHT OVARIAN CYSTECTOMY, EVACUATION OF HEMOPERITONEUM     Antibiotics: cefazolin (Ancef) ordered on call to OR     Anesthesia: Type: General  ASA:  II     Objective      Operative findings: 3 L hemoperitoneum; large complex right ovarian cyst, partly dermoid partly hemorrhagic partly simple.  Grossly normal in right fallopian tube.  Left hydrosalpinx with clubbing of the fimbria; minimal left ovarian tissue; grossly normal uterus; endometrial implants along the peritoneal surfaces in the cul-de-sac (posterior); abdominal pelvic adhesions throughout the cavity;   Specimens removed: ID Type Source Tests Collected by Time   A : Right Ovarian Cyst Fluid For Cytology Body Fluid Ovary, Right NON-GYNECOLOGIC CYTOLOGY Rachelle Marroquin MD 9/27/2024 1557   B : Right Ovarian Cyst and Clot Cyst Ovary, Right TISSUE PATHOLOGY EXAM Rachelle Marroquin MD 9/27/2024 1608   C : Right Ovarian Cyst Fluid For Cytology Body Fluid Ovary, Right NON-GYNECOLOGIC CYTOLOGY Rachelle Marroquin MD 9/27/2024 1656      Fluid Intake: 3750 mL   Output: Documented Output  Est. Blood Loss 50 mL; +3000 hemoperitoneum  Urine Output 325 mL      I/O this shift:  In: 3750 [I.V.:2000; IV Piggyback:1750]  Out: 3375 [Urine:325; Blood:3050]     Blood products used: No   Drains: [REMOVED] Urethral Catheter (Removed)      Implant Information: Implant Name Type Inv. Item Serial No.  Lot No. LRB No. Used Action   HEMOST ABS SURGICEL NUKNIT 6X9 -  [id number] Implant HEMOST ABS SURGICEL NUKNIT 6X9  [employer] [id number] Right 1 Implanted      Complications: None     Description of procedure: Patient presented to the ED with abdominal pain.  Found to have complex right ovarian cyst and hemoperitoneum.  Patient was counseled and surgical intervention was advised.  Patient taken to the operating room after the appropriate consents were obtained.  She was placed in dorsolithotomy position after ministration of general anesthesia.  She was prepped and draped in the usual sterile fashion.  Timeout procedures were performed.  Indwelling Peterson catheter was placed.  She had pelvic exam under anesthesia and placement of the [employer] [place] uterine manipulator.  IUD strings were present.  Gloves were changed.  Patient repositioned.  Attention was turned to the abdominal portion of the procedure.  Quarter percent Marcaine plain was infiltrated prior to making abdominal incisions.  Stab incision was made superior to the umbilicus.  Veress needle was introduced followed by placement of the 5 mm trocar.  Pneumo peritoneum was established.  Inspection was carried out.  Findings as noted above.  11 mm port was placed under direct visualization approximately 8 cm lateral and 2 cm inferior to the umbilical port a 5 mm port was placed under direct visualization contralateral to that.  A another 5 mm port was placed in the right lower quadrant also under direct visualization.  There was active bleeding from the right ovarian cyst.  The cyst were resected and drained.  They appeared to have multiple components, including purely cystic, hemorrhagic and sebaceous.  As much of the cyst as possible was resected.  The bed was then cauterized.  The specimen was removed via Endobag from the 11 mm port.  The patient was positioned several times in Trendelenburg and reverse Trendelenburg to assist with elimination of the hemoperitoneum with suctioning and removal of clot along with the  specimen and the specimen bag.  The pneumoperitoneum was reduced to 7 mmHg.  The surgical site was hemostatic.  The pelvis was irrigated copiously.  Nu-Knit was placed on the raw bed of the right ovary.  Pneumoperitoneum expelled.  All instruments were removed.  Incisions were repaired with 4-0 Monocryl for good hemostasis and reapproximation.  The urinary catheter was removed.  Urine was noted to be clear and discolored from the Pyridium that was administered prior to the surgery.  The [place] uterine manipulator was removed removed.  Hemostasis at the surgical site with digital pressure.  The patient was left in stable condition awaiting extubation and transport to the PACU.  Specimens taken to the appropriate labs.   Condition: stable   Disposition: to PACU and then admit to  medical - surgical floor     Surgical Assistant: Yasmine Muller was responsible for performing the following activities: Retraction, Suction, Irrigation, Suturing, Closing, Placing Dressing, and Held/Positioned Camera and their skilled assistance was necessary for the success of this case.     Assessment & Plan     23-hour observation with routine postoperative care              Rachelle Marroquin MD  09/27/24  18:18 EDT

## 2024-09-27 NOTE — H&P
GYN CONSULT NOTE    Date: 2024   Patient Name: Janey Wakefield  : 2000  MRN: [id number]  Primary Care Physician:  Padmini Hernández APRN  Referring Physician: No ref. provider found  Date of admission: 2024    Reason for Consultation: abdominal pain    Patient Care Team:  Padmini Hernández APRN as PCP - General (Internal Medicine & Pediatrics)    Chief complaint abdominal pain    Subjective   History of present illness:  Janey Wakefield is a 24 y.o. female with acute onset of abdominal pain that started last night. Rates pain 11/10 so severe. Tried to sleep and see if it improved but has not. Moving makes it worse. Pain starts in low right abdomen and radiates up to upper abdomen. No nausea or vomiting. No dysuria.   GYN: has liletta IUD; cycles monthly with old dark blood, LMP was ~8/20.    Review of Systems  Pertinent items are noted in HPI    Review of Systems     History:  Past Medical History:   Diagnosis Date    Adnexal cyst 2015    LEFT     Bladder problem 2023    IIH (idiopathic intracranial hypertension) 10/10/2018    THE PATIENTS CLINICAL HISTORY AND EXAM ARE MOST CONSISTENT WITH IDIOPATHIC INTRACRANIAL HYPERTENSION. SHE STARTED HER FIRST DOSE OF DIAMOX TODAY. I WILL PURSUE A CT HEAD. I WILL PURSUE AN UGENT LUMBAR PUNCTURE. I WILL CHECK LABS. I WILL PURSUE AN MRV. I DID DISCUSS PURSUING A LUMBAR PUNCTURE TODAY BUT THE PATIENT DEFFERED. WE WILL ATTEMPT TO  ARRANGE THIS TOMORROW. I INSTRUCTED THE PATIENT TO SEE     Mass of uterine adnexa 2015    Post lumbar puncture headache 12/10/2016    Renal calculus 2023    Ureteral calculus 2015    RIGHT   ,   Past Surgical History:   Procedure Laterality Date    HX OVARIAN CYSTECTOMY  2015    LUMBAR PUNCTURE      TONSILLECTOMY     ,   Family History   Problem Relation Age of Onset    Diabetes Mother     Ulcerative colitis Father     Crohn's disease Father     Arthritis Father     Urolithiasis Other    ,    Social History     Socioeconomic History    Marital status: Single   Tobacco Use    Smoking status: Every Day     Current packs/day: 0.75     Types: Cigarettes    Smokeless tobacco: Never   Vaping Use    Vaping status: Never Used   Substance and Sexual Activity    Alcohol use: Yes     Alcohol/week: 5.0 standard drinks of alcohol     Types: 5 Cans of beer per week     Comment: occ    Drug use: Yes     Frequency: 7.0 times per week     Types: Marijuana    Sexual activity: Yes     Birth control/protection: I.U.D.     Comment: Lyletta     E-cigarette/Vaping    E-cigarette/Vaping Use Never User      E-cigarette/Vaping Substances     (Not in a hospital admission)      Scheduled Meds:  sodium chloride, 1,000 mL, Intravenous, Once      Continuous Infusions:     PRN Meds:    sodium chloride  Allergies:  Oxycodone-acetaminophen    Objective     Vital Signs   Temp:  [97.9 °F (36.6 °C)] 97.9 °F (36.6 °C)  Heart Rate:  [] 92  Resp:  [18] 18  BP: (112-121)/(64-67) 121/67    Physical Exam:    Physical Exam  Constitutional:       Appearance: Normal appearance.   HENT:      Head: Normocephalic and atraumatic.   Cardiovascular:      Rate and Rhythm: Normal rate and regular rhythm.      Heart sounds: No murmur heard.  Pulmonary:      Effort: Pulmonary effort is normal.      Breath sounds: Normal breath sounds. No wheezing or rales.   Abdominal:      General: Abdomen is flat. Bowel sounds are normal.      Palpations: Abdomen is soft.      Tenderness: There is abdominal tenderness. There is guarding.   Musculoskeletal:         General: Normal range of motion.      Cervical back: Normal range of motion.   Skin:     General: Skin is warm and dry.   Neurological:      General: No focal deficit present.      Mental Status: She is alert and oriented to person, place, and time.   Psychiatric:         Mood and Affect: Mood normal.         Behavior: Behavior normal.         Thought Content: Thought content normal.         Judgment:  Judgment normal.          Result Review    Result Review:  Lab Results (last 24 hours)       Procedure Component Value Units Date/Time    CBC & Differential [[id number]]  (Abnormal) Collected: 09/27/24 1057    Specimen: Blood Updated: 09/27/24 1106    Narrative:      The following orders were created for panel order CBC & Differential.  Procedure                               Abnormality         Status                     ---------                               -----------         ------                     CBC Auto Differential[[id number]]        Abnormal            Final result                 Please view results for these tests on the individual orders.    Comprehensive Metabolic Panel [[id number]]  (Abnormal) Collected: 09/27/24 1057    Specimen: Blood Updated: 09/27/24 1125     Glucose 117 mg/dL      BUN 14 mg/dL      Creatinine 0.64 mg/dL      Sodium 135 mmol/L      Potassium 4.0 mmol/L      Chloride 103 mmol/L      CO2 21.1 mmol/L      Calcium 8.6 mg/dL      Total Protein 6.4 g/dL      Albumin 3.9 g/dL      ALT (SGPT) 21 U/L      AST (SGOT) 16 U/L      Alkaline Phosphatase 66 U/L      Total Bilirubin 0.6 mg/dL      Globulin 2.5 gm/dL      A/G Ratio 1.6 g/dL      BUN/Creatinine Ratio 21.9     Anion Gap 10.9 mmol/L      eGFR 126.0 mL/min/1.73     Narrative:      GFR Normal >60  Chronic Kidney Disease <60  Kidney Failure <15      Lipase [[id number]]  (Abnormal) Collected: 09/27/24 1057    Specimen: Blood Updated: 09/27/24 1125     Lipase 91 U/L     Urinalysis With Microscopic If Indicated (No Culture) - Urine, Clean Catch [[id number]]  (Abnormal) Collected: 09/27/24 1057    Specimen: Urine, Clean Catch Updated: 09/27/24 1111     Color, UA Yellow     Appearance, UA Turbid     pH, UA 5.5     Specific Gravity, UA >1.030     Glucose, UA Negative     Ketones, UA Negative     Bilirubin, UA Negative     Blood, UA Negative     Protein, UA Trace     Leuk Esterase, UA Negative     Nitrite, UA Negative     Urobilinogen, UA 1.0  E.U./dL    Narrative:      Urine microscopic not indicated.    hCG, Quantitative, Pregnancy [[id number]] Collected: 09/27/24 1057    Specimen: Blood Updated: 09/27/24 1123     HCG Quantitative <0.50 mIU/mL     Narrative:      HCG Ranges by Gestational Age    Females - non-pregnant premenopausal   </= 1mIU/mL HCG  Females - postmenopausal               </= 7mIU/mL HCG    3 Weeks       5.4   -      72 mIU/mL  4 Weeks      10.2   -     708 mIU/mL  5 Weeks       217   -   8,245 mIU/mL  6 Weeks       152   -  32,177 mIU/mL  7 Weeks     4,059   - 153,767 mIU/mL  8 Weeks    31,366   - 149,094 mIU/mL  9 Weeks    59,109   - 135,901 mIU/mL  10 Weeks   44,186   - 170,409 mIU/mL  12 Weeks   27,107   - 201,615 mIU/mL  14 Weeks   24,302   -  93,646 mIU/mL  15 Weeks   12,540   -  69,747 mIU/mL  16 Weeks    8,904   -  55,332 mIU/mL  17 Weeks    8,240   -  51,793 mIU/mL  18 Weeks    9,649   -  55,271 mIU/mL      CBC Auto Differential [[id number]]  (Abnormal) Collected: 09/27/24 1057    Specimen: Blood Updated: 09/27/24 1106     WBC 18.06 10*3/mm3      RBC 3.79 10*6/mm3      Hemoglobin 12.4 g/dL      Hematocrit 35.7 %      MCV 94.2 fL      MCH 32.7 pg      MCHC 34.7 g/dL      RDW 13.1 %      RDW-SD 45.1 fl      MPV 9.4 fL      Platelets 339 10*3/mm3      Neutrophil % 78.8 %      Lymphocyte % 12.0 %      Monocyte % 7.0 %      Eosinophil % 1.0 %      Basophil % 0.6 %      Immature Grans % 0.6 %      Neutrophils, Absolute 14.26 10*3/mm3      Lymphocytes, Absolute 2.16 10*3/mm3      Monocytes, Absolute 1.26 10*3/mm3      Eosinophils, Absolute 0.18 10*3/mm3      Basophils, Absolute 0.10 10*3/mm3      Immature Grans, Absolute 0.10 10*3/mm3      nRBC 0.0 /100 WBC           ADDENDUM #1  ADDENDUM: The hemorrhagic/soft tissue density within the pelvis measures up to 12 x 10 x 9 cm. It is unclear whether this is hemorrhagic clot as mentioned above or combination of clot and soft tissue from the right adnexa. Ultrasound would be useful for    further characterization if the patient is hemodynamically stable     Electronically Signed: Shaq Hatfield MD    9/27/2024 12:17 PM EDT    Workstation ID: [id number]  ORIGINAL REPORT:  CT ABDOMEN PELVIS W CONTRAST     Date of Exam: 9/27/2024 11:33 AM EDT     Indication: abdominal pain.     Comparison: 3/30/2018     Technique: Axial CT images were obtained of the abdomen and pelvis after the uneventful intravenous administration of iodinated contrast. Reconstructed coronal and sagittal images were also obtained. Automated exposure control and iterative construction   methods were used.           FINDINGS:     Abdomen/Pelvis:     Lower Chest: Limited image of the lower chest is grossly unremarkable in appearance.     No free air is noted below the diaphragm. There is a moderate to large amount of increased density within the abdomen and pelvis. This is more prominent in density within the pelvis     Organs: The liver, gallbladder, pancreas, spleen, kidneys and adrenal glands appear unremarkable.     GI/Bowel: The stomach and small bowel demonstrate no definite acute abnormality. Colon demonstrates diverticulosis. Evaluation of the colon is somewhat limited by incomplete distention and surrounding fluid. Appendix is unable to be visualized     Pelvis: High density fluid surrounds an abnormal lesion within the right adnexa containing fat soft tissue and calcification. This lesion measures at least 3.3 x 4.6 cm and may be larger. Density of material within the pelvis is not simple fluid density   with some more focal areas surrounding the adnexa which could represent increased soft tissue or hemorrhagic clot. The uterus has an IUD in place. Left ovary not well seen. The bladder is decompressed     Peritoneum/Retroperitoneum: The aorta is normal in caliber. There is no suspicious retroperitoneal adenopathy     Bones/Soft Tissues: No destructive bone lesion.     IMPRESSION:     1. Moderate to large amount of high  density fluid within the abdomen and pelvis compatible with hemoperitoneum     2. 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 This measures at least 3.6 x 4.6 cm in size. Increased density surrounding this may represent   hemorrhagic clot or a component of soft tissue.     3. There is an IUD device in place within the uterus which appears to be grossly unremarkable     4. More focal dense material within the pelvis may represent focal hemorrhagic clot and/or additional soft tissue associated with the right ovary.     Per ordering provider patient has a negative pregnancy test. Findings may represent rupture of a hemorrhagic cyst. Torsion of abnormal right adnexa is not excluded. Consultation with OB/GYN for further management is recommended.     Findings were discussed with Dr. Ayon at the time of interpretation.       Electronically Signed: Shaq Hatfield MD    9/27/2024 12:06 PM EDT    Workstation ID: [id number]  Finalized by Shaq Hatfield MD on Fri Sep 27, 2024 12:06 PM    Assessment & Plan     Patient Active Problem List   Diagnosis    Right ovarian cyst    Hemoperitoneum     Plan: discussed findings with patient. Due to severe pain and CT findings recommend surgical evaluation and treatment. Dr. Marroquin is gyn for surgery today. Plan for diagnostic laparoscopy, evacuation of hemoperitoneum, right ovarian cystectomy vs right oopherectomy, any other indicated procedures. Risks of surgery: anesthesia, bleeding, infection, injury to bowel/bladder/vessels, oopherectomy all given to patient. Dr. Marroquin notified and states she will come see patient. OR notified.     I discussed the patients findings and my recommendations with patient, nursing staff, and consulting provider.    Electronically signed by Daysi Koroma MD, 09/27/24, 12:57 PM EDT.

## 2024-09-28 ENCOUNTER — READMISSION MANAGEMENT (OUTPATIENT)
Dept: CALL CENTER | Facility: HOSPITAL | Age: 24
End: 2024-09-28
Payer: COMMERCIAL

## 2024-09-28 VITALS
TEMPERATURE: 98.6 F | DIASTOLIC BLOOD PRESSURE: 63 MMHG | RESPIRATION RATE: 16 BRPM | HEART RATE: 75 BPM | WEIGHT: 156.75 LBS | HEIGHT: 62 IN | SYSTOLIC BLOOD PRESSURE: 106 MMHG | BODY MASS INDEX: 28.84 KG/M2 | OXYGEN SATURATION: 96 %

## 2024-09-28 LAB
HCT VFR BLD AUTO: 23.5 % (ref 34–46.6)
HGB BLD-MCNC: 8 G/DL (ref 12–15.9)
HOLD SPECIMEN: NORMAL

## 2024-09-28 PROCEDURE — 85018 HEMOGLOBIN: CPT | Performed by: OBSTETRICS & GYNECOLOGY

## 2024-09-28 PROCEDURE — 25010000002 KETOROLAC TROMETHAMINE PER 15 MG: Performed by: OBSTETRICS & GYNECOLOGY

## 2024-09-28 PROCEDURE — G0378 HOSPITAL OBSERVATION PER HR: HCPCS

## 2024-09-28 PROCEDURE — 85014 HEMATOCRIT: CPT | Performed by: OBSTETRICS & GYNECOLOGY

## 2024-09-28 RX ORDER — PSEUDOEPHEDRINE HCL 30 MG
100 TABLET ORAL DAILY PRN
Qty: 30 CAPSULE | Refills: 0 | Status: SHIPPED | OUTPATIENT
Start: 2024-09-28 | End: 2024-09-28

## 2024-09-28 RX ORDER — FERROUS SULFATE 325(65) MG
325 TABLET ORAL 3 TIMES WEEKLY
Qty: 12 TABLET | Refills: 1 | Status: SHIPPED | OUTPATIENT
Start: 2024-09-30 | End: 2024-09-28

## 2024-09-28 RX ORDER — HYDROCODONE BITARTRATE AND ACETAMINOPHEN 5; 325 MG/1; MG/1
1 TABLET ORAL EVERY 6 HOURS PRN
Qty: 12 TABLET | Refills: 0 | Status: SHIPPED | OUTPATIENT
Start: 2024-09-28 | End: 2024-09-28 | Stop reason: HOSPADM

## 2024-09-28 RX ORDER — FERROUS SULFATE 325(65) MG
325 TABLET ORAL 3 TIMES WEEKLY
Qty: 12 TABLET | Refills: 1 | Status: SHIPPED | OUTPATIENT
Start: 2024-09-30

## 2024-09-28 RX ORDER — IBUPROFEN 600 MG/1
600 TABLET, FILM COATED ORAL EVERY 6 HOURS PRN
Qty: 20 TABLET | Refills: 0 | Status: SHIPPED | OUTPATIENT
Start: 2024-09-28 | End: 2024-09-28 | Stop reason: HOSPADM

## 2024-09-28 RX ORDER — IBUPROFEN 600 MG/1
600 TABLET, FILM COATED ORAL EVERY 6 HOURS PRN
Qty: 30 TABLET | Refills: 0 | Status: SHIPPED | OUTPATIENT
Start: 2024-09-28

## 2024-09-28 RX ORDER — HYDROCODONE BITARTRATE AND ACETAMINOPHEN 5; 325 MG/1; MG/1
1 TABLET ORAL EVERY 6 HOURS PRN
Qty: 10 TABLET | Refills: 0 | Status: SHIPPED | OUTPATIENT
Start: 2024-09-28

## 2024-09-28 RX ORDER — PSEUDOEPHEDRINE HCL 30 MG
100 TABLET ORAL DAILY PRN
Qty: 30 CAPSULE | Refills: 0 | Status: SHIPPED | OUTPATIENT
Start: 2024-09-28 | End: 2024-10-28

## 2024-09-28 RX ADMIN — KETOROLAC TROMETHAMINE 15 MG: 15 INJECTION, SOLUTION INTRAMUSCULAR; INTRAVENOUS at 02:43

## 2024-09-28 RX ADMIN — POTASSIUM CHLORIDE, DEXTROSE MONOHYDRATE AND SODIUM CHLORIDE 100 ML/HR: 150; 5; 450 INJECTION, SOLUTION INTRAVENOUS at 06:09

## 2024-09-28 RX ADMIN — HYDROCODONE BITARTRATE AND ACETAMINOPHEN 2 TABLET: 7.5; 325 TABLET ORAL at 12:15

## 2024-09-28 RX ADMIN — HYDROCODONE BITARTRATE AND ACETAMINOPHEN 2 TABLET: 7.5; 325 TABLET ORAL at 07:39

## 2024-09-28 NOTE — PLAN OF CARE
Goal Outcome Evaluation:  Plan of Care Reviewed With: patient        Progress: improving  Outcome Evaluation: pt discharging home, self care

## 2024-09-28 NOTE — PLAN OF CARE
Goal Outcome Evaluation:  Plan of Care Reviewed With: patient        Progress: no change  Outcome Evaluation: VSS. Pt o2 was <90% on room air. Applied 0.5L via NC and o2 sat increased to >90%. No complaint of SOA. Encouraged pt to take deep breathe. Dried drainage marked on abdominal dressing. No overt sign of bleeding. Iron given x1. Voiding without difficulty. Pain adequately controlled by scheduled torodol. IVF infusing. Per patient, only taking sips of liquids.

## 2024-09-28 NOTE — PAYOR COMM NOTE
"PATIENT INFORMATION  Name:  Janey Wakefield  MRN#:     [id number]  :  2000         ADMISSION INFORMATION  CLASS: Observation   DOS:  24        CURRENT ATTENDING PROVIDER INFORMATION  Name/NPI: Rachelle Marroquin MD [[id number]]  Phone:  Phone: [phone]  Fax:  [phone]        REQUESTING PROVIDER and RENDERING FACILITY  Name:  [facility]   NPI:  [id number]  TID:  [id number]  Address:      [facility] [place] [address]  Phone:               [phone]  Fax:  [phone]        UTILIZATION REVIEW CONTACT INFORMATION  Phone:      [phone]  Fax:           [phone]        ADMISSION DIAGNOSIS  Hemoperitoneum [K66.1]  Right ovarian cyst [N83.201]  Abdominal pain, unspecified abdominal location [R10.9]  Cyst of ovary, unspecified laterality [N83.209]  Encounter for postoperative care [Z48.89]        ++++++++++++++++++++++++++++++++++++++++++++++++++++++++++++++++++++++++++++++++          Janey Wakefield (24 y.o. Female)       Date of Birth   2000    Social Security Number       Address   [address] [place] [address]    Home Phone   [phone]    MRN   [id number]       Latter-day   None    Marital Status   Single                            Admission Date   24    Admission Type   Emergency    Admitting Provider   Rachelle Marroquin MD    Attending Provider   Rachelle Marroquin MD    Department, Room/Bed   [facility] 5TH FLOOR SURGICAL TELEMETRY UNIT,        Discharge Date       Discharge Disposition       Discharge Destination                                 Attending Provider: Rachelle Marroquin MD    Allergies: Oxycodone-acetaminophen    Isolation: None   Infection: None   Code Status: CPR    Ht: 157.5 cm (62\")   Wt: 71.1 kg (156 lb 12 oz)    Admission Cmt: None   Principal Problem: Encounter for postoperative care [Z48.89]                   Active Insurance as of 2024       Primary Coverage  "      Payor Plan Insurance Group Employer/Plan Group    MARYBETH RUEDA [id number]       Payor Plan Address Payor Plan Phone Number Payor Plan Fax Number Effective Dates    [address] [phone]  2015 - None Entered    [place] [address]         Subscriber Name Subscriber Birth Date Member ID       JANEY WAKEFIELD 2000 [id number]                     Emergency Contacts        (Rel.) Home Phone Work Phone Mobile Phone    HEAVENLY WAKEFIELD (Mother) -- -- [phone]                 History & Physical        Rachelle Marroquin MD at 24 1349          H&P updated. The patient was examined and findings are consistent with history and physical.  Chart has been reviewed including laboratory and imaging results.  Patient has been informed of the indications alternatives and benefits and risk of surgery .  She was informed that risk include loss of the ovary on the right side, hemorrhage, blood transfusion, damage to surrounding organs such as bowel bladder blood vessels, open procedure, and DVT.  She has had the opportunity to ask questions.  She verbalizes her understanding as well as her willingness to proceed and accept the aforementioned risk.    Electronically signed by Rachelle Marroquin MD at 24 7618   Source Note: H&P              GYN CONSULT NOTE    Date: 2024   Patient Name: Janey Wakefield  : 2000  MRN: [id number]  Primary Care Physician:  Padmini Hernández APRN  Referring Physician: No ref. provider found  Date of admission: 2024    Reason for Consultation: abdominal pain    Patient Care Team:  Padmini Hernández APRN as PCP - General (Internal Medicine & Pediatrics)    Chief complaint abdominal pain    Subjective  History of present illness:  Janey Wakefield is a 24 y.o. female with acute onset of abdominal pain that started last night. Rates pain 11/10 so severe. Tried to sleep and see if it improved but has not. Moving makes it worse. Pain  starts in low right abdomen and radiates up to upper abdomen. No nausea or vomiting. No dysuria.   GYN: has liletta IUD; cycles monthly with old dark blood, LMP was ~8/20.    Review of Systems  Pertinent items are noted in HPI    Review of Systems     History:  Past Medical History:   Diagnosis Date    Adnexal cyst 03/31/2015    LEFT     Bladder problem 03/08/2023    IIH (idiopathic intracranial hypertension) 10/10/2018    THE PATIENTS CLINICAL HISTORY AND EXAM ARE MOST CONSISTENT WITH IDIOPATHIC INTRACRANIAL HYPERTENSION. SHE STARTED HER FIRST DOSE OF DIAMOX TODAY. I WILL PURSUE A CT HEAD. I WILL PURSUE AN UGENT LUMBAR PUNCTURE. I WILL CHECK LABS. I WILL PURSUE AN MRV. I DID DISCUSS PURSUING A LUMBAR PUNCTURE TODAY BUT THE PATIENT DEFFERED. WE WILL ATTEMPT TO  ARRANGE THIS TOMORROW. I INSTRUCTED THE PATIENT TO SEE     Mass of uterine adnexa 03/31/2015    Post lumbar puncture headache 12/10/2016    Renal calculus 03/08/2023    Ureteral calculus 03/31/2015    RIGHT   ,   Past Surgical History:   Procedure Laterality Date    HX OVARIAN CYSTECTOMY  2015    LUMBAR PUNCTURE      TONSILLECTOMY     ,   Family History   Problem Relation Age of Onset    Diabetes Mother     Ulcerative colitis Father     Crohn's disease Father     Arthritis Father     Urolithiasis Other    ,   Social History     Socioeconomic History    Marital status: Single   Tobacco Use    Smoking status: Every Day     Current packs/day: 0.75     Types: Cigarettes    Smokeless tobacco: Never   Vaping Use    Vaping status: Never Used   Substance and Sexual Activity    Alcohol use: Yes     Alcohol/week: 5.0 standard drinks of alcohol     Types: 5 Cans of beer per week     Comment: occ    Drug use: Yes     Frequency: 7.0 times per week     Types: Marijuana    Sexual activity: Yes     Birth control/protection: I.U.D.     Comment: Lyletta     E-cigarette/Vaping    E-cigarette/Vaping Use Never User      E-cigarette/Vaping Substances     (Not in a hospital  admission)      Scheduled Meds:  sodium chloride, 1,000 mL, Intravenous, Once      Continuous Infusions:     PRN Meds:    sodium chloride  Allergies:  Oxycodone-acetaminophen    Objective    Vital Signs   Temp:  [97.9 °F (36.6 °C)] 97.9 °F (36.6 °C)  Heart Rate:  [] 92  Resp:  [18] 18  BP: (112-121)/(64-67) 121/67    Physical Exam:    Physical Exam  Constitutional:       Appearance: Normal appearance.   HENT:      Head: Normocephalic and atraumatic.   Cardiovascular:      Rate and Rhythm: Normal rate and regular rhythm.      Heart sounds: No murmur heard.  Pulmonary:      Effort: Pulmonary effort is normal.      Breath sounds: Normal breath sounds. No wheezing or rales.   Abdominal:      General: Abdomen is flat. Bowel sounds are normal.      Palpations: Abdomen is soft.      Tenderness: There is abdominal tenderness. There is guarding.   Musculoskeletal:         General: Normal range of motion.      Cervical back: Normal range of motion.   Skin:     General: Skin is warm and dry.   Neurological:      General: No focal deficit present.      Mental Status: She is alert and oriented to person, place, and time.   Psychiatric:         Mood and Affect: Mood normal.         Behavior: Behavior normal.         Thought Content: Thought content normal.         Judgment: Judgment normal.          Result Review   Result Review:  Lab Results (last 24 hours)       Procedure Component Value Units Date/Time    CBC & Differential [[id number]]  (Abnormal) Collected: 09/27/24 1057    Specimen: Blood Updated: 09/27/24 1106    Narrative:      The following orders were created for panel order CBC & Differential.  Procedure                               Abnormality         Status                     ---------                               -----------         ------                     CBC Auto Differential[[id number]]        Abnormal            Final result                 Please view results for these tests on the individual orders.     Comprehensive Metabolic Panel [[id number]]  (Abnormal) Collected: 09/27/24 1057    Specimen: Blood Updated: 09/27/24 1125     Glucose 117 mg/dL      BUN 14 mg/dL      Creatinine 0.64 mg/dL      Sodium 135 mmol/L      Potassium 4.0 mmol/L      Chloride 103 mmol/L      CO2 21.1 mmol/L      Calcium 8.6 mg/dL      Total Protein 6.4 g/dL      Albumin 3.9 g/dL      ALT (SGPT) 21 U/L      AST (SGOT) 16 U/L      Alkaline Phosphatase 66 U/L      Total Bilirubin 0.6 mg/dL      Globulin 2.5 gm/dL      A/G Ratio 1.6 g/dL      BUN/Creatinine Ratio 21.9     Anion Gap 10.9 mmol/L      eGFR 126.0 mL/min/1.73     Narrative:      GFR Normal >60  Chronic Kidney Disease <60  Kidney Failure <15      Lipase [[id number]]  (Abnormal) Collected: 09/27/24 1057    Specimen: Blood Updated: 09/27/24 1125     Lipase 91 U/L     Urinalysis With Microscopic If Indicated (No Culture) - Urine, Clean Catch [[id number]]  (Abnormal) Collected: 09/27/24 1057    Specimen: Urine, Clean Catch Updated: 09/27/24 1111     Color, UA Yellow     Appearance, UA Turbid     pH, UA 5.5     Specific Gravity, UA >1.030     Glucose, UA Negative     Ketones, UA Negative     Bilirubin, UA Negative     Blood, UA Negative     Protein, UA Trace     Leuk Esterase, UA Negative     Nitrite, UA Negative     Urobilinogen, UA 1.0 E.U./dL    Narrative:      Urine microscopic not indicated.    hCG, Quantitative, Pregnancy [[id number]] Collected: 09/27/24 1057    Specimen: Blood Updated: 09/27/24 1123     HCG Quantitative <0.50 mIU/mL     Narrative:      HCG Ranges by Gestational Age    Females - non-pregnant premenopausal   </= 1mIU/mL HCG  Females - postmenopausal               </= 7mIU/mL HCG    3 Weeks       5.4   -      72 mIU/mL  4 Weeks      10.2   -     708 mIU/mL  5 Weeks       217   -   8,245 mIU/mL  6 Weeks       152   -  32,177 mIU/mL  7 Weeks     4,059   - 153,767 mIU/mL  8 Weeks    31,366   - 149,094 mIU/mL  9 Weeks    59,109   - 135,901 mIU/mL  10 Weeks   44,186   -  170,409 mIU/mL  12 Weeks   27,107   - 201,615 mIU/mL  14 Weeks   24,302   -  93,646 mIU/mL  15 Weeks   12,540   -  69,747 mIU/mL  16 Weeks    8,904   -  55,332 mIU/mL  17 Weeks    8,240   -  51,793 mIU/mL  18 Weeks    9,649   -  55,271 mIU/mL      CBC Auto Differential [[id number]]  (Abnormal) Collected: 09/27/24 1057    Specimen: Blood Updated: 09/27/24 1106     WBC 18.06 10*3/mm3      RBC 3.79 10*6/mm3      Hemoglobin 12.4 g/dL      Hematocrit 35.7 %      MCV 94.2 fL      MCH 32.7 pg      MCHC 34.7 g/dL      RDW 13.1 %      RDW-SD 45.1 fl      MPV 9.4 fL      Platelets 339 10*3/mm3      Neutrophil % 78.8 %      Lymphocyte % 12.0 %      Monocyte % 7.0 %      Eosinophil % 1.0 %      Basophil % 0.6 %      Immature Grans % 0.6 %      Neutrophils, Absolute 14.26 10*3/mm3      Lymphocytes, Absolute 2.16 10*3/mm3      Monocytes, Absolute 1.26 10*3/mm3      Eosinophils, Absolute 0.18 10*3/mm3      Basophils, Absolute 0.10 10*3/mm3      Immature Grans, Absolute 0.10 10*3/mm3      nRBC 0.0 /100 WBC           ADDENDUM #1  ADDENDUM: The hemorrhagic/soft tissue density within the pelvis measures up to 12 x 10 x 9 cm. It is unclear whether this is hemorrhagic clot as mentioned above or combination of clot and soft tissue from the right adnexa. Ultrasound would be useful for   further characterization if the patient is hemodynamically stable     Electronically Signed: Shaq Hatfield MD    9/27/2024 12:17 PM EDT    Workstation ID: [id number]  ORIGINAL REPORT:  CT ABDOMEN PELVIS W CONTRAST     Date of Exam: 9/27/2024 11:33 AM EDT     Indication: abdominal pain.     Comparison: 3/30/2018     Technique: Axial CT images were obtained of the abdomen and pelvis after the uneventful intravenous administration of iodinated contrast. Reconstructed coronal and sagittal images were also obtained. Automated exposure control and iterative construction   methods were used.           FINDINGS:     Abdomen/Pelvis:     Lower Chest: Limited image  of the lower chest is grossly unremarkable in appearance.     No free air is noted below the diaphragm. There is a moderate to large amount of increased density within the abdomen and pelvis. This is more prominent in density within the pelvis     Organs: The liver, gallbladder, pancreas, spleen, kidneys and adrenal glands appear unremarkable.     GI/Bowel: The stomach and small bowel demonstrate no definite acute abnormality. Colon demonstrates diverticulosis. Evaluation of the colon is somewhat limited by incomplete distention and surrounding fluid. Appendix is unable to be visualized     Pelvis: High density fluid surrounds an abnormal lesion within the right adnexa containing fat soft tissue and calcification. This lesion measures at least 3.3 x 4.6 cm and may be larger. Density of material within the pelvis is not simple fluid density   with some more focal areas surrounding the adnexa which could represent increased soft tissue or hemorrhagic clot. The uterus has an IUD in place. Left ovary not well seen. The bladder is decompressed     Peritoneum/Retroperitoneum: The aorta is normal in caliber. There is no suspicious retroperitoneal adenopathy     Bones/Soft Tissues: No destructive bone lesion.     IMPRESSION:     1. Moderate to large amount of high density fluid within the abdomen and pelvis compatible with hemoperitoneum     2. 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 This measures at least 3.6 x 4.6 cm in size. Increased density surrounding this may represent   hemorrhagic clot or a component of soft tissue.     3. There is an IUD device in place within the uterus which appears to be grossly unremarkable     4. More focal dense material within the pelvis may represent focal hemorrhagic clot and/or additional soft tissue associated with the right ovary.     Per ordering provider patient has a  negative pregnancy test. Findings may represent rupture of a hemorrhagic cyst. Torsion of abnormal right adnexa is not excluded. Consultation with OB/GYN for further management is recommended.     Findings were discussed with Dr. Ayon at the time of interpretation.       Electronically Signed: Shaq Hatfield MD    2024 12:06 PM EDT    Workstation ID: [id number]  Finalized by Shaq Hatfield MD on Fri Sep 27, 2024 12:06 PM    Assessment & Plan    Patient Active Problem List   Diagnosis    Right ovarian cyst    Hemoperitoneum     Plan: discussed findings with patient. Due to severe pain and CT findings recommend surgical evaluation and treatment. Dr. Marroquin is gyn for surgery today. Plan for diagnostic laparoscopy, evacuation of hemoperitoneum, right ovarian cystectomy vs right oopherectomy, any other indicated procedures. Risks of surgery: anesthesia, bleeding, infection, injury to bowel/bladder/vessels, oopherectomy all given to patient. Dr. Marroquin notified and states she will come see patient. OR notified.     I discussed the patients findings and my recommendations with patient, nursing staff, and consulting provider.    Electronically signed by Daysi Koroma MD, 24, 12:57 PM EDT.       Electronically signed by Daysi Koroma MD at 24 1308                 Daysi Koroma MD at 24 1257              GYN CONSULT NOTE    Date: 2024   Patient Name: Janey Wakefield  : 2000  MRN: [id number]  Primary Care Physician:  Padmini Hernández APRN  Referring Physician: No ref. provider found  Date of admission: 2024    Reason for Consultation: abdominal pain    Patient Care Team:  Padmini Hernández APRN as PCP - General (Internal Medicine & Pediatrics)    Chief complaint abdominal pain    Subjective  History of present illness:  Janey Wakefield is a 24 y.o. female with acute onset of abdominal pain that started last night. Rates pain 11/10 so severe. Tried to  sleep and see if it improved but has not. Moving makes it worse. Pain starts in low right abdomen and radiates up to upper abdomen. No nausea or vomiting. No dysuria.   GYN: has liletta IUD; cycles monthly with old dark blood, LMP was ~8/20.    Review of Systems  Pertinent items are noted in HPI    Review of Systems     History:  Past Medical History:   Diagnosis Date    Adnexal cyst 03/31/2015    LEFT     Bladder problem 03/08/2023    IIH (idiopathic intracranial hypertension) 10/10/2018    THE PATIENTS CLINICAL HISTORY AND EXAM ARE MOST CONSISTENT WITH IDIOPATHIC INTRACRANIAL HYPERTENSION. SHE STARTED HER FIRST DOSE OF DIAMOX TODAY. I WILL PURSUE A CT HEAD. I WILL PURSUE AN UGENT LUMBAR PUNCTURE. I WILL CHECK LABS. I WILL PURSUE AN MRV. I DID DISCUSS PURSUING A LUMBAR PUNCTURE TODAY BUT THE PATIENT DEFFERED. WE WILL ATTEMPT TO  ARRANGE THIS TOMORROW. I INSTRUCTED THE PATIENT TO SEE     Mass of uterine adnexa 03/31/2015    Post lumbar puncture headache 12/10/2016    Renal calculus 03/08/2023    Ureteral calculus 03/31/2015    RIGHT   ,   Past Surgical History:   Procedure Laterality Date    HX OVARIAN CYSTECTOMY  2015    LUMBAR PUNCTURE      TONSILLECTOMY     ,   Family History   Problem Relation Age of Onset    Diabetes Mother     Ulcerative colitis Father     Crohn's disease Father     Arthritis Father     Urolithiasis Other    ,   Social History     Socioeconomic History    Marital status: Single   Tobacco Use    Smoking status: Every Day     Current packs/day: 0.75     Types: Cigarettes    Smokeless tobacco: Never   Vaping Use    Vaping status: Never Used   Substance and Sexual Activity    Alcohol use: Yes     Alcohol/week: 5.0 standard drinks of alcohol     Types: 5 Cans of beer per week     Comment: occ    Drug use: Yes     Frequency: 7.0 times per week     Types: Marijuana    Sexual activity: Yes     Birth control/protection: I.U.D.     Comment: Lyletta     E-cigarette/Vaping    E-cigarette/Vaping Use Never  User      E-cigarette/Vaping Substances     (Not in a hospital admission)      Scheduled Meds:  sodium chloride, 1,000 mL, Intravenous, Once      Continuous Infusions:     PRN Meds:    sodium chloride  Allergies:  Oxycodone-acetaminophen    Objective    Vital Signs   Temp:  [97.9 °F (36.6 °C)] 97.9 °F (36.6 °C)  Heart Rate:  [] 92  Resp:  [18] 18  BP: (112-121)/(64-67) 121/67    Physical Exam:    Physical Exam  Constitutional:       Appearance: Normal appearance.   HENT:      Head: Normocephalic and atraumatic.   Cardiovascular:      Rate and Rhythm: Normal rate and regular rhythm.      Heart sounds: No murmur heard.  Pulmonary:      Effort: Pulmonary effort is normal.      Breath sounds: Normal breath sounds. No wheezing or rales.   Abdominal:      General: Abdomen is flat. Bowel sounds are normal.      Palpations: Abdomen is soft.      Tenderness: There is abdominal tenderness. There is guarding.   Musculoskeletal:         General: Normal range of motion.      Cervical back: Normal range of motion.   Skin:     General: Skin is warm and dry.   Neurological:      General: No focal deficit present.      Mental Status: She is alert and oriented to person, place, and time.   Psychiatric:         Mood and Affect: Mood normal.         Behavior: Behavior normal.         Thought Content: Thought content normal.         Judgment: Judgment normal.          Result Review   Result Review:  Lab Results (last 24 hours)       Procedure Component Value Units Date/Time    CBC & Differential [[id number]]  (Abnormal) Collected: 09/27/24 1057    Specimen: Blood Updated: 09/27/24 1106    Narrative:      The following orders were created for panel order CBC & Differential.  Procedure                               Abnormality         Status                     ---------                               -----------         ------                     CBC Auto Differential[[id number]]        Abnormal            Final result                  Please view results for these tests on the individual orders.    Comprehensive Metabolic Panel [[id number]]  (Abnormal) Collected: 09/27/24 1057    Specimen: Blood Updated: 09/27/24 1125     Glucose 117 mg/dL      BUN 14 mg/dL      Creatinine 0.64 mg/dL      Sodium 135 mmol/L      Potassium 4.0 mmol/L      Chloride 103 mmol/L      CO2 21.1 mmol/L      Calcium 8.6 mg/dL      Total Protein 6.4 g/dL      Albumin 3.9 g/dL      ALT (SGPT) 21 U/L      AST (SGOT) 16 U/L      Alkaline Phosphatase 66 U/L      Total Bilirubin 0.6 mg/dL      Globulin 2.5 gm/dL      A/G Ratio 1.6 g/dL      BUN/Creatinine Ratio 21.9     Anion Gap 10.9 mmol/L      eGFR 126.0 mL/min/1.73     Narrative:      GFR Normal >60  Chronic Kidney Disease <60  Kidney Failure <15      Lipase [[id number]]  (Abnormal) Collected: 09/27/24 1057    Specimen: Blood Updated: 09/27/24 1125     Lipase 91 U/L     Urinalysis With Microscopic If Indicated (No Culture) - Urine, Clean Catch [[id number]]  (Abnormal) Collected: 09/27/24 1057    Specimen: Urine, Clean Catch Updated: 09/27/24 1111     Color, UA Yellow     Appearance, UA Turbid     pH, UA 5.5     Specific Gravity, UA >1.030     Glucose, UA Negative     Ketones, UA Negative     Bilirubin, UA Negative     Blood, UA Negative     Protein, UA Trace     Leuk Esterase, UA Negative     Nitrite, UA Negative     Urobilinogen, UA 1.0 E.U./dL    Narrative:      Urine microscopic not indicated.    hCG, Quantitative, Pregnancy [[id number]] Collected: 09/27/24 1057    Specimen: Blood Updated: 09/27/24 1123     HCG Quantitative <0.50 mIU/mL     Narrative:      HCG Ranges by Gestational Age    Females - non-pregnant premenopausal   </= 1mIU/mL HCG  Females - postmenopausal               </= 7mIU/mL HCG    3 Weeks       5.4   -      72 mIU/mL  4 Weeks      10.2   -     708 mIU/mL  5 Weeks       217   -   8,245 mIU/mL  6 Weeks       152   -  32,177 mIU/mL  7 Weeks     4,059   - 153,767 mIU/mL  8 Weeks    31,366   - 149,094  mIU/mL  9 Weeks    59,109   - 135,901 mIU/mL  10 Weeks   44,186   - 170,409 mIU/mL  12 Weeks   27,107   - 201,615 mIU/mL  14 Weeks   24,302   -  93,646 mIU/mL  15 Weeks   12,540   -  69,747 mIU/mL  16 Weeks    8,904   -  55,332 mIU/mL  17 Weeks    8,240   -  51,793 mIU/mL  18 Weeks    9,649   -  55,271 mIU/mL      CBC Auto Differential [[id number]]  (Abnormal) Collected: 09/27/24 1057    Specimen: Blood Updated: 09/27/24 1106     WBC 18.06 10*3/mm3      RBC 3.79 10*6/mm3      Hemoglobin 12.4 g/dL      Hematocrit 35.7 %      MCV 94.2 fL      MCH 32.7 pg      MCHC 34.7 g/dL      RDW 13.1 %      RDW-SD 45.1 fl      MPV 9.4 fL      Platelets 339 10*3/mm3      Neutrophil % 78.8 %      Lymphocyte % 12.0 %      Monocyte % 7.0 %      Eosinophil % 1.0 %      Basophil % 0.6 %      Immature Grans % 0.6 %      Neutrophils, Absolute 14.26 10*3/mm3      Lymphocytes, Absolute 2.16 10*3/mm3      Monocytes, Absolute 1.26 10*3/mm3      Eosinophils, Absolute 0.18 10*3/mm3      Basophils, Absolute 0.10 10*3/mm3      Immature Grans, Absolute 0.10 10*3/mm3      nRBC 0.0 /100 WBC           ADDENDUM #1  ADDENDUM: The hemorrhagic/soft tissue density within the pelvis measures up to 12 x 10 x 9 cm. It is unclear whether this is hemorrhagic clot as mentioned above or combination of clot and soft tissue from the right adnexa. Ultrasound would be useful for   further characterization if the patient is hemodynamically stable     Electronically Signed: Shaq Hatfield MD    9/27/2024 12:17 PM EDT    Workstation ID: [id number]  ORIGINAL REPORT:  CT ABDOMEN PELVIS W CONTRAST     Date of Exam: 9/27/2024 11:33 AM EDT     Indication: abdominal pain.     Comparison: 3/30/2018     Technique: Axial CT images were obtained of the abdomen and pelvis after the uneventful intravenous administration of iodinated contrast. Reconstructed coronal and sagittal images were also obtained. Automated exposure control and iterative construction   methods were used.            FINDINGS:     Abdomen/Pelvis:     Lower Chest: Limited image of the lower chest is grossly unremarkable in appearance.     No free air is noted below the diaphragm. There is a moderate to large amount of increased density within the abdomen and pelvis. This is more prominent in density within the pelvis     Organs: The liver, gallbladder, pancreas, spleen, kidneys and adrenal glands appear unremarkable.     GI/Bowel: The stomach and small bowel demonstrate no definite acute abnormality. Colon demonstrates diverticulosis. Evaluation of the colon is somewhat limited by incomplete distention and surrounding fluid. Appendix is unable to be visualized     Pelvis: High density fluid surrounds an abnormal lesion within the right adnexa containing fat soft tissue and calcification. This lesion measures at least 3.3 x 4.6 cm and may be larger. Density of material within the pelvis is not simple fluid density   with some more focal areas surrounding the adnexa which could represent increased soft tissue or hemorrhagic clot. The uterus has an IUD in place. Left ovary not well seen. The bladder is decompressed     Peritoneum/Retroperitoneum: The aorta is normal in caliber. There is no suspicious retroperitoneal adenopathy     Bones/Soft Tissues: No destructive bone lesion.     IMPRESSION:     1. Moderate to large amount of high density fluid within the abdomen and pelvis compatible with hemoperitoneum     2. 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 This measures at least 3.6 x 4.6 cm in size. Increased density surrounding this may represent   hemorrhagic clot or a component of soft tissue.     3. There is an IUD device in place within the uterus which appears to be grossly unremarkable     4. More focal dense material within the pelvis may represent focal hemorrhagic clot and/or additional soft tissue associated  with the right ovary.     Per ordering provider patient has a negative pregnancy test. Findings may represent rupture of a hemorrhagic cyst. Torsion of abnormal right adnexa is not excluded. Consultation with OB/GYN for further management is recommended.     Findings were discussed with Dr. Ayon at the time of interpretation.       Electronically Signed: Shaq Hatfield MD    9/27/2024 12:06 PM EDT    Workstation ID: [id number]  Finalized by Shaq Hatfield MD on Fri Sep 27, 2024 12:06 PM    Assessment & Plan    Patient Active Problem List   Diagnosis    Right ovarian cyst    Hemoperitoneum     Plan: discussed findings with patient. Due to severe pain and CT findings recommend surgical evaluation and treatment. Dr. Marroquin is gyn for surgery today. Plan for diagnostic laparoscopy, evacuation of hemoperitoneum, right ovarian cystectomy vs right oopherectomy, any other indicated procedures. Risks of surgery: anesthesia, bleeding, infection, injury to bowel/bladder/vessels, oopherectomy all given to patient. Dr. Marroquin notified and states she will come see patient. OR notified.     I discussed the patients findings and my recommendations with patient, nursing staff, and consulting provider.    Electronically signed by Daysi Koroma MD, 09/27/24, 12:57 PM EDT.       Electronically signed by Daysi Koroma MD at 09/27/24 1308          Emergency Department Notes        Bam Bliss MD at 09/27/24 1031          Time: 10:31 AM EDT  Date of encounter:  9/27/2024  Independent Historian/Clinical History and Information was obtained by:   Patient    History is limited by: N/A    Chief Complaint: Abdominal pain      History of Present Illness:  Patient is a 24 y.o. year old female who presents to the emergency department for evaluation of abdominal pain.  Reports that she has been having nominal pain since yesterday.  Does have a history of previous ovarian cyst with removal.  States that she started  having abdominal pain in all of her abdomen that, radiates up to her chest.  Denies nausea or vomiting.  Does report that she had some blood on her toilet paper but this is happened to her multiple times.  Denies any dysuria, vaginal discharge, vaginal bleeding.  Last menstrual period was 1 month ago.  No other complaints this time.      Patient Care Team  Primary Care Provider: Padmini Hernández APRN    Past Medical History:     Allergies   Allergen Reactions    Oxycodone-Acetaminophen Itching     Past Medical History:   Diagnosis Date    Adnexal cyst 03/31/2015    LEFT     Bladder problem 03/08/2023    IIH (idiopathic intracranial hypertension) 10/10/2018    THE PATIENTS CLINICAL HISTORY AND EXAM ARE MOST CONSISTENT WITH IDIOPATHIC INTRACRANIAL HYPERTENSION. SHE STARTED HER FIRST DOSE OF DIAMOX TODAY. I WILL PURSUE A CT HEAD. I WILL PURSUE AN UGENT LUMBAR PUNCTURE. I WILL CHECK LABS. I WILL PURSUE AN MRV. I DID DISCUSS PURSUING A LUMBAR PUNCTURE TODAY BUT THE PATIENT DEFFERED. WE WILL ATTEMPT TO  ARRANGE THIS TOMORROW. I INSTRUCTED THE PATIENT TO SEE     Mass of uterine adnexa 03/31/2015    Post lumbar puncture headache 12/10/2016    Renal calculus 03/08/2023    Ureteral calculus 03/31/2015    RIGHT     Past Surgical History:   Procedure Laterality Date    HX OVARIAN CYSTECTOMY  2015    LUMBAR PUNCTURE      TONSILLECTOMY       Family History   Problem Relation Age of Onset    Diabetes Mother     Ulcerative colitis Father     Crohn's disease Father     Arthritis Father     Urolithiasis Other        Home Medications:  Prior to Admission medications    Medication Sig Start Date End Date Taking? Authorizing Provider   Levonorgestrel (Liletta, 52 MG,) 19.5 MCG/DAY intrauterine device     Emergency, Nurse Issa, RN        Social History:   Social History     Tobacco Use    Smoking status: Every Day     Current packs/day: 0.75     Types: Cigarettes    Smokeless tobacco: Never   Vaping Use    Vaping status: Never Used  "  Substance Use Topics    Alcohol use: Yes     Alcohol/week: 5.0 standard drinks of alcohol     Types: 5 Cans of beer per week     Comment: occ    Drug use: Yes     Frequency: 7.0 times per week     Types: Marijuana         Review of Systems:  Review of Systems     Physical Exam:  /67 (BP Location: Right arm, Patient Position: Lying)   Pulse 92   Temp 97.9 °F (36.6 °C) (Oral)   Resp 18   Ht 157.5 cm (62\")   Wt 71.1 kg (156 lb 12 oz)   SpO2 98%   BMI 28.67 kg/m²     Physical Exam  Vitals and nursing note reviewed.   Constitutional:       Appearance: Normal appearance.   HENT:      Head: Normocephalic and atraumatic.   Eyes:      General: No scleral icterus.  Cardiovascular:      Rate and Rhythm: Normal rate and regular rhythm.      Heart sounds: Normal heart sounds.   Pulmonary:      Effort: Pulmonary effort is normal.      Breath sounds: Normal breath sounds.   Abdominal:      Palpations: Abdomen is soft.      Tenderness: There is abdominal tenderness.      Comments: Worse in the lower abdomen   Musculoskeletal:         General: Normal range of motion.      Cervical back: Normal range of motion.   Skin:     Findings: No rash.   Neurological:      General: No focal deficit present.      Mental Status: She is alert.                  Procedures:  Procedures      Medical Decision Making:      Comorbidities that affect care:    Ovarian cyst, kidney stones    External Notes reviewed:  Reviewed note from 7/1/2024        The following orders were placed and all results were independently analyzed by me:  Orders Placed This Encounter   Procedures    CT Abdomen Pelvis With Contrast    [place] Draw    Comprehensive Metabolic Panel    Lipase    Urinalysis With Microscopic If Indicated (No Culture) - Urine, Clean Catch    hCG, Quantitative, Pregnancy    CBC Auto Differential    Pregnancy, Urine - Urine, Clean Catch    Hemoglobin & Hematocrit, Blood    NPO Diet NPO Type: Strict NPO    Undress & Gown    Verify " Informed Consent    Maintain Sequential Compression Device    Saline Lock & Maintain IV Access    Remove Scopolamine Patch 24 Hours After Application    Continuous Pulse Oximetry    Code Status and Medical Interventions: CPR (Attempt to Resuscitate); Full Support    IP Consult to OB GYN    Type & Screen    Prepare RBC, 2 Units    ABO RH Specimen Verification    Insert Peripheral IV    CBC & Differential    Green Top (Gel)    Lavender Top    Gold Top - SST    Light Blue Top       Medications Given in the Emergency Department:  Medications   sodium chloride 0.9 % flush 10 mL ( Intravenous MAR Hold 9/27/24 1427)   sodium chloride 0.9 % bolus 1,000 mL ( Intravenous MAR Hold 9/27/24 1427)   scopolamine patch 1 mg/72 hr (1 patch Transdermal Medication Applied 9/27/24 1443)   lactated ringers infusion ( Intravenous New Bag 9/27/24 1727)   bupivacaine (PF) (MARCAINE) 0.25 % injection (30 mL Infiltration Given 9/27/24 1714)   sodium chloride 0.9 % bolus 1,000 mL (0 mL Intravenous Stopped 9/27/24 1315)   morphine injection 4 mg (4 mg Intravenous Given 9/27/24 1105)   ondansetron (ZOFRAN) injection 4 mg (4 mg Intravenous Given 9/27/24 1105)   iopamidol (ISOVUE-370) 76 % injection 100 mL (100 mL Intravenous Given 9/27/24 1139)   phenazopyridine (PYRIDIUM) tablet 200 mg (200 mg Oral Given 9/27/24 1454)   acetaminophen (TYLENOL) tablet 1,000 mg (1,000 mg Oral Given 9/27/24 1443)   Midazolam HCl (PF) (VERSED) injection 2 mg (2 mg Intravenous Given 9/27/24 1454)        ED Course:    ED Course as of 09/27/24 1743   Fri Sep 27, 2024   1206 Spoke with Dr. Koroma who will come evaluate the patient.  [MA]   1242 Spoke Dr. Koroma who recommended taking the patient the OR.  Will go to the OR. [MA]      ED Course User Index  [MA] Bam Bliss MD       Labs:    Lab Results (last 24 hours)       Procedure Component Value Units Date/Time    CBC & Differential [[id number]]  (Abnormal) Collected: 09/27/24 1057    Specimen: Blood Updated:  09/27/24 1106    Narrative:      The following orders were created for panel order CBC & Differential.  Procedure                               Abnormality         Status                     ---------                               -----------         ------                     CBC Auto Differential[[id number]]        Abnormal            Final result                 Please view results for these tests on the individual orders.    Comprehensive Metabolic Panel [[id number]]  (Abnormal) Collected: 09/27/24 1057    Specimen: Blood Updated: 09/27/24 1125     Glucose 117 mg/dL      BUN 14 mg/dL      Creatinine 0.64 mg/dL      Sodium 135 mmol/L      Potassium 4.0 mmol/L      Chloride 103 mmol/L      CO2 21.1 mmol/L      Calcium 8.6 mg/dL      Total Protein 6.4 g/dL      Albumin 3.9 g/dL      ALT (SGPT) 21 U/L      AST (SGOT) 16 U/L      Alkaline Phosphatase 66 U/L      Total Bilirubin 0.6 mg/dL      Globulin 2.5 gm/dL      A/G Ratio 1.6 g/dL      BUN/Creatinine Ratio 21.9     Anion Gap 10.9 mmol/L      eGFR 126.0 mL/min/1.73     Narrative:      GFR Normal >60  Chronic Kidney Disease <60  Kidney Failure <15      Lipase [[id number]]  (Abnormal) Collected: 09/27/24 1057    Specimen: Blood Updated: 09/27/24 1125     Lipase 91 U/L     Urinalysis With Microscopic If Indicated (No Culture) - Urine, Clean Catch [[id number]]  (Abnormal) Collected: 09/27/24 1057    Specimen: Urine, Clean Catch Updated: 09/27/24 1111     Color, UA Yellow     Appearance, UA Turbid     pH, UA 5.5     Specific Gravity, UA >1.030     Glucose, UA Negative     Ketones, UA Negative     Bilirubin, UA Negative     Blood, UA Negative     Protein, UA Trace     Leuk Esterase, UA Negative     Nitrite, UA Negative     Urobilinogen, UA 1.0 E.U./dL    Narrative:      Urine microscopic not indicated.    hCG, Quantitative, Pregnancy [[id number]] Collected: 09/27/24 1057    Specimen: Blood Updated: 09/27/24 1123     HCG Quantitative <0.50 mIU/mL     Narrative:      HCG  Ranges by Gestational Age    Females - non-pregnant premenopausal   </= 1mIU/mL HCG  Females - postmenopausal               </= 7mIU/mL HCG    3 Weeks       5.4   -      72 mIU/mL  4 Weeks      10.2   -     708 mIU/mL  5 Weeks       217   -   8,245 mIU/mL  6 Weeks       152   -  32,177 mIU/mL  7 Weeks     4,059   - 153,767 mIU/mL  8 Weeks    31,366   - 149,094 mIU/mL  9 Weeks    59,109   - 135,901 mIU/mL  10 Weeks   44,186   - 170,409 mIU/mL  12 Weeks   27,107   - 201,615 mIU/mL  14 Weeks   24,302   -  93,646 mIU/mL  15 Weeks   12,540   -  69,747 mIU/mL  16 Weeks    8,904   -  55,332 mIU/mL  17 Weeks    8,240   -  51,793 mIU/mL  18 Weeks    9,649   -  55,271 mIU/mL      CBC Auto Differential [[id number]]  (Abnormal) Collected: 09/27/24 1057    Specimen: Blood Updated: 09/27/24 1106     WBC 18.06 10*3/mm3      RBC 3.79 10*6/mm3      Hemoglobin 12.4 g/dL      Hematocrit 35.7 %      MCV 94.2 fL      MCH 32.7 pg      MCHC 34.7 g/dL      RDW 13.1 %      RDW-SD 45.1 fl      MPV 9.4 fL      Platelets 339 10*3/mm3      Neutrophil % 78.8 %      Lymphocyte % 12.0 %      Monocyte % 7.0 %      Eosinophil % 1.0 %      Basophil % 0.6 %      Immature Grans % 0.6 %      Neutrophils, Absolute 14.26 10*3/mm3      Lymphocytes, Absolute 2.16 10*3/mm3      Monocytes, Absolute 1.26 10*3/mm3      Eosinophils, Absolute 0.18 10*3/mm3      Basophils, Absolute 0.10 10*3/mm3      Immature Grans, Absolute 0.10 10*3/mm3      nRBC 0.0 /100 WBC              Imaging:    CT Abdomen Pelvis With Contrast    Addendum Date: 9/27/2024    ADDENDUM #1 ADDENDUM: The hemorrhagic/soft tissue density within the pelvis measures up to 12 x 10 x 9 cm. It is unclear whether this is hemorrhagic clot as mentioned above or combination of clot and soft tissue from the right adnexa. Ultrasound would be useful for further characterization if the patient is hemodynamically stable Electronically Signed: Shaq Hatfield MD  9/27/2024 12:17 PM EDT  Workstation ID: [id number]  ORIGINAL REPORT: CT ABDOMEN PELVIS W CONTRAST Date of Exam: 9/27/2024 11:33 AM EDT Indication: abdominal pain. Comparison: 3/30/2018 Technique: Axial CT images were obtained of the abdomen and pelvis after the uneventful intravenous administration of iodinated contrast. Reconstructed coronal and sagittal images were also obtained. Automated exposure control and iterative construction methods were used. FINDINGS: Abdomen/Pelvis: Lower Chest: Limited image of the lower chest is grossly unremarkable in appearance. No free air is noted below the diaphragm. There is a moderate to large amount of increased density within the abdomen and pelvis. This is more prominent in density within the pelvis Organs: The liver, gallbladder, pancreas, spleen, kidneys and adrenal glands appear unremarkable. GI/Bowel: The stomach and small bowel demonstrate no definite acute abnormality. Colon demonstrates diverticulosis. Evaluation of the colon is somewhat limited by incomplete distention and surrounding fluid. Appendix is unable to be visualized Pelvis: High density fluid surrounds an abnormal lesion within the right adnexa containing fat soft tissue and calcification. This lesion measures at least 3.3 x 4.6 cm and may be larger. Density of material within the pelvis is not simple fluid density with some more focal areas surrounding the adnexa which could represent increased soft tissue or hemorrhagic clot. The uterus has an IUD in place. Left ovary not well seen. The bladder is decompressed Peritoneum/Retroperitoneum: The aorta is normal in caliber. There is no suspicious retroperitoneal adenopathy Bones/Soft Tissues: No destructive bone lesion. IMPRESSION: 1. Moderate to large amount of high density fluid within the abdomen and pelvis compatible with hemoperitoneum 2. 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 This measures  at least 3.6 x 4.6 cm in size. Increased density surrounding this may represent hemorrhagic clot or a component of soft tissue. 3. There is an IUD device in place within the uterus which appears to be grossly unremarkable 4. More focal dense material within the pelvis may represent focal hemorrhagic clot and/or additional soft tissue associated with the right ovary. Per ordering provider patient has a negative pregnancy test. Findings may represent rupture of a hemorrhagic cyst. Torsion of abnormal right adnexa is not excluded. Consultation with OB/GYN for further management is recommended. Findings were discussed with Dr. Ayon at the time of interpretation. Electronically Signed: Shaq Hatfield MD  9/27/2024 12:06 PM EDT  Workstation ID: [id number]    Result Date: 9/27/2024  CT ABDOMEN PELVIS W CONTRAST Date of Exam: 9/27/2024 11:33 AM EDT Indication: abdominal pain. Comparison: 3/30/2018 Technique: Axial CT images were obtained of the abdomen and pelvis after the uneventful intravenous administration of iodinated contrast. Reconstructed coronal and sagittal images were also obtained. Automated exposure control and iterative construction methods were used. FINDINGS: Abdomen/Pelvis: Lower Chest: Limited image of the lower chest is grossly unremarkable in appearance. No free air is noted below the diaphragm. There is a moderate to large amount of increased density within the abdomen and pelvis. This is more prominent in density within the pelvis Organs: The liver, gallbladder, pancreas, spleen, kidneys and adrenal glands appear unremarkable. GI/Bowel: The stomach and small bowel demonstrate no definite acute abnormality. Colon demonstrates diverticulosis. Evaluation of the colon is somewhat limited by incomplete distention and surrounding fluid. Appendix is unable to be visualized Pelvis: High density fluid surrounds an abnormal lesion within the right adnexa containing fat soft tissue and calcification. This  lesion measures at least 3.3 x 4.6 cm and may be larger. Density of material within the pelvis is not simple fluid density with some more focal areas surrounding the adnexa which could represent increased soft tissue or hemorrhagic clot. The uterus has an IUD in place. Left ovary not well seen. The bladder is decompressed Peritoneum/Retroperitoneum: The aorta is normal in caliber. There is no suspicious retroperitoneal adenopathy Bones/Soft Tissues: No destructive bone lesion.     1. Moderate to large amount of high density fluid within the abdomen and pelvis compatible with hemoperitoneum 2. 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 This measures at least 3.6 x 4.6 cm in size. Increased density surrounding this may represent hemorrhagic clot or a component of soft tissue. 3. There is an IUD device in place within the uterus which appears to be grossly unremarkable 4. More focal dense material within the pelvis may represent focal hemorrhagic clot and/or additional soft tissue associated with the right ovary. Per ordering provider patient has a negative pregnancy test. Findings may represent rupture of a hemorrhagic cyst. Torsion of abnormal right adnexa is not excluded. Consultation with OB/GYN for further management is recommended. Findings were discussed with Dr. Ayon at the time of interpretation. Electronically Signed: Shaq Hatfield MD  9/27/2024 12:06 PM EDT  Workstation ID: [id number]       Differential Diagnosis and Discussion:    Abdominal Pain: Based on the patient's signs and symptoms, I considered abdominal aortic aneurysm, small bowel obstruction, pancreatitis, acute cholecystitis, acute appendecitis, peptic ulcer disease, gastritis, colitis, endocrine disorders, irritable bowel syndrome and other differential diagnosis an etiology of the patient's abdominal pain.    All labs were reviewed  and interpreted by me.  CT scan radiology impression was interpreted by me.    MDM     Patient is a 24-year-old female who presents with complaints of abdominal pain.  Found to have blood within her abdomen.  Appears to be likely from a ruptured cyst.  OB came evaluated the patient.  Will take to the OR.  Will admit to the hospital further workup management.      Total Critical Care time of 33 minutes. Total critical care time documented does not include time spent on separately billed procedures for services of nurses or physician assistants. I personally saw and examined the patient. I have reviewed all diagnostic interpretations and treatment plans as written. I was present for the key portions of any procedures performed and the inclusive time noted in any critical care statement. Critical care time includes patient management by me, time spent at the patients bedside,  time to review lab and imaging results, discussing patient care, documentation in the medical record, and time spent with family or caregiver.          Patient Care Considerations:          Consultants/Shared Management Plan:    Consultant: I have discussed the case with Dr. Koroam who states will take the patient to the OR    Social Determinants of Health:          Disposition and Care Coordination:    Send to OR/Endoscopy        Final diagnoses:   Hemoperitoneum   Abdominal pain, unspecified abdominal location   Cyst of ovary, unspecified laterality        ED Disposition       ED Disposition   Send to OR    Condition   --    Comment   --               This medical record created using voice recognition software.             Bam Bliss MD  09/27/24 1742      Electronically signed by Bam Bliss MD at 09/27/24 1747       Blood Administration Record (From admission, onward)      None             Operative/Procedure Notes (last 24 hours)        Rachelle Marroquin MD at 09/27/24 7684       Summary:Laparoscopic right ovarian  cystectomy; evacuation of hemoperitoneum                   Subjective     Date of Service:  09/27/24  Time of Service:  18:19 EDT    Surgical Staff: Surgeons and Role:     * Rachelle Marroquin MD - Primary   Additional Staff: Yasmine Gusman RN, surgical tech   Pre-operative diagnosis(es): Pre-Op Diagnosis Codes:     * Hemoperitoneum [K66.1]     * Right ovarian cyst [N83.201]     Post-operative diagnosis(es): Post-Op Diagnosis Codes:     * Hemoperitoneum [K66.1]     * Right ovarian cyst [N83.201]   Procedure(s): Procedure(s):  DIAGNOSTIC LAPAROSCOPY, RIGHT OVARIAN CYSTECTOMY, EVACUATION OF HEMOPERITONEUM     Antibiotics: cefazolin (Ancef) ordered on call to OR     Anesthesia: Type: General  ASA:  II     Objective      Operative findings: 3 L hemoperitoneum; large complex right ovarian cyst, partly dermoid partly hemorrhagic partly simple.  Grossly normal in right fallopian tube.  Left hydrosalpinx with clubbing of the fimbria; minimal left ovarian tissue; grossly normal uterus; endometrial implants along the peritoneal surfaces in the cul-de-sac (posterior); abdominal pelvic adhesions throughout the cavity;   Specimens removed: ID Type Source Tests Collected by Time   A : Right Ovarian Cyst Fluid For Cytology Body Fluid Ovary, Right NON-GYNECOLOGIC CYTOLOGY Rachelle Marroquin MD 9/27/2024 1557   B : Right Ovarian Cyst and Clot Cyst Ovary, Right TISSUE PATHOLOGY EXAM Rachelle Marroquin MD 9/27/2024 1608   C : Right Ovarian Cyst Fluid For Cytology Body Fluid Ovary, Right NON-GYNECOLOGIC CYTOLOGY Rachelle Marroquin MD 9/27/2024 1656      Fluid Intake: 3750 mL   Output: Documented Output  Est. Blood Loss 50 mL; +3000 hemoperitoneum  Urine Output 325 mL      I/O this shift:  In: 3750 [I.V.:2000; IV Piggyback:1750]  Out: 3375 [Urine:325; Blood:3050]     Blood products used: No   Drains: [REMOVED] Urethral Catheter (Removed)      Implant Information: Implant Name Type Inv. Item Serial No.  Lot No.  LRB No. Used Action   HEMOST ABS SURGICEL NUKNIT 6X9 - [id number] Implant HEMOST ABS SURGICEL NUKNIT 6X9  [employer] [id number] Right 1 Implanted      Complications: None     Description of procedure: Patient presented to the ED with abdominal pain.  Found to have complex right ovarian cyst and hemoperitoneum.  Patient was counseled and surgical intervention was advised.  Patient taken to the operating room after the appropriate consents were obtained.  She was placed in dorsolithotomy position after ministration of general anesthesia.  She was prepped and draped in the usual sterile fashion.  Timeout procedures were performed.  Indwelling Peterson catheter was placed.  She had pelvic exam under anesthesia and placement of the Navendis uterine manipulator.  IUD strings were present.  Gloves were changed.  Patient repositioned.  Attention was turned to the abdominal portion of the procedure.  Quarter percent Marcaine plain was infiltrated prior to making abdominal incisions.  Stab incision was made superior to the umbilicus.  Veress needle was introduced followed by placement of the 5 mm trocar.  Pneumo peritoneum was established.  Inspection was carried out.  Findings as noted above.  11 mm port was placed under direct visualization approximately 8 cm lateral and 2 cm inferior to the umbilical port a 5 mm port was placed under direct visualization contralateral to that.  A another 5 mm port was placed in the right lower quadrant also under direct visualization.  There was active bleeding from the right ovarian cyst.  The cyst were resected and drained.  They appeared to have multiple components, including purely cystic, hemorrhagic and sebaceous.  As much of the cyst as possible was resected.  The bed was then cauterized.  The specimen was removed via Endobag from the 11 mm port.  The patient was positioned several times in Trendelenburg and reverse Trendelenburg to assist with elimination of the hemoperitoneum  with suctioning and removal of clot along with the specimen and the specimen bag.  The pneumoperitoneum was reduced to 7 mmHg.  The surgical site was hemostatic.  The pelvis was irrigated copiously.  Nu-Knit was placed on the raw bed of the right ovary.  Pneumoperitoneum expelled.  All instruments were removed.  Incisions were repaired with 4-0 Monocryl for good hemostasis and reapproximation.  The urinary catheter was removed.  Urine was noted to be clear and discolored from the Pyridium that was administered prior to the surgery.  The Hulka uterine manipulator was removed removed.  Hemostasis at the surgical site with digital pressure.  The patient was left in stable condition awaiting extubation and transport to the PACU.  Specimens taken to the appropriate labs.   Condition: stable   Disposition: to PACU and then admit to  medical - surgical floor     Surgical Assistant: Yasmine Muller was responsible for performing the following activities: Retraction, Suction, Irrigation, Suturing, Closing, Placing Dressing, and Held/Positioned Camera and their skilled assistance was necessary for the success of this case.     Assessment & Plan     23-hour observation with routine postoperative care             Rachelle Marroquin MD  09/27/24  18:18 EDT      Electronically signed by Rachelle Marroquin MD at 09/27/24 4874       Consult Notes (last 24 hours)  Notes from 09/26/24 2034 through 09/27/24 2034   No notes of this type exist for this encounter.

## 2024-09-28 NOTE — DISCHARGE INSTR - ACTIVITY
Pelvic Rest: 4 weeks  No heavy lifting >20lbs  No tub bath or pool 4 weeks  No douching, tampon for 6 weeks.

## 2024-09-28 NOTE — DISCHARGE SUMMARY
"GYN Discharge Summary      Admit Date:  9/27/2024  Discharge Date: 09/28/24    Reason for Admission/Chief Complaint:  Hemoperitoneum [K66.1]  Right ovarian cyst [N83.201]  Abdominal pain, unspecified abdominal location [R10.9]  Cyst of ovary, unspecified laterality [N83.209]  Encounter for postoperative care [Z48.89]     Final Diagnosis:  Hemoperitoneum with Right Ovarian Cystectomy       Hospital Course/Signifcant Findings/Treatment/Procedure performed:  POD #1  On the day of discharge POSTOP tolerating a regular diet, ambulating, pain well controlled, urinating spontaneously, and any vaginal bleeding was appropriate.   Vital signs were stable and afebrile.  Exam was within normal limits.  Incision/s was/were intact, well approximated and without signs of infection.  Abdomen was soft nondistended nontender.  Meeting discharge criteria and desired discharge home.  Postop instructions and FU reviewed and questions answered.    Results from last 7 days   Lab Units 09/28/24  0438 09/27/24  1825 09/27/24  1057   WBC 10*3/mm3  --   --  18.06*   HEMOGLOBIN g/dL 8.0* 8.6* 12.4   HEMATOCRIT % 23.5* 25.7* 35.7   PLATELETS 10*3/mm3  --   --  339           No results found for: \"FIBRINOGEN\"       Discharge Medications        New Medications        Instructions Start Date   docusate sodium 100 MG capsule   100 mg, Oral, Daily PRN      ferrous sulfate 325 (65 FE) MG tablet   325 mg, Oral, 3 Times Weekly   Start Date: September 30, 2024     HYDROcodone-acetaminophen 5-325 MG per tablet  Commonly known as: Norco   1 tablet, Oral, Every 6 Hours PRN      ibuprofen 600 MG tablet  Commonly known as: ADVIL,MOTRIN   600 mg, Oral, Every 6 Hours PRN             Continue These Medications        Instructions Start Date   amphetamine-dextroamphetamine XR 20 MG 24 hr capsule  Commonly known as: ADDERALL XR   20 mg, Oral, Daily      Liletta (52 MG) 19.5 MCG/DAY intrauterine device  Generic drug: Levonorgestrel   No dose, route, or frequency " recorded.                Diet: Regular     Activity: Pelvic Rest: 2-4 weeks  No heavy lifting >20lbs  No tub bath or pool 4 weeks     Condition at discharge: Good, Stable     Follow up with: Dr. Rachelle Marroquin     Follow up in: 2 weeks     Complications: None     Disposition: Home      Signature: Jaye Laguerre MD

## 2024-09-28 NOTE — PROGRESS NOTES
[facility] PROGRESS NOTE        Chief complaint:   Chief Complaint   Patient presents with    Abdominal Pain     all over abdominal pain since last night    Hemoperitoneum evacuation with right ovarian cystectomy via diagnostic laparoscopy     Subjective:  Patient has no complaints. Requesting discharge to home.  Tolerating regular diet and good pain control with Norco.      Objective:     Vitals: reviewed  General- NAD, alert and oriented, appropriate  Psych- Normal mood, good memory  Cardiovascular- Regular rhythm, no murmur  Respiratory- CTA to bases, no wheezes  Abdomen- Soft, non distended, non tender, normal bowel sounds  Incision/s- Bandaid dressing Bandage/s intact, dry.  To be removed in the shower prior to discharge.  Extremities- No edema, non-tender    CBC          9/27/2024    10:57 9/27/2024    18:25 9/28/2024    04:38   CBC   WBC 18.06      RBC 3.79      Hemoglobin 12.4  8.6  8.0    Hematocrit 35.7  25.7  23.5    MCV 94.2      MCH 32.7      MCHC 34.7      RDW 13.1      Platelets 339          CMP          9/27/2024    10:57   CMP   Glucose 117    BUN 14    Creatinine 0.64    EGFR 126.0    Sodium 135    Potassium 4.0    Chloride 103    Calcium 8.6    Total Protein 6.4    Albumin 3.9    Globulin 2.5    Total Bilirubin 0.6    Alkaline Phosphatase 66    AST (SGOT) 16    ALT (SGPT) 21    Albumin/Globulin Ratio 1.6    BUN/Creatinine Ratio 21.9    Anion Gap 10.9          Intake/Output Summary (Last 24 hours) at 9/28/2024 0816  Last data filed at 9/28/2024 0609  Gross per 24 hour   Intake 6420 ml   Output 4475 ml   Net 1945 ml          Assessment:    HD/POD 1  Diagnostic lap with Rt Ovarian Cystectomy and evacuation of hemoperitoneum    Plan:   Remove bandage prior to discharge  Patient may Shower  Importance of wound care/keep clean and dry discussed  Discharge home  Follow up to be scheduled in 2 weeks.    Discharge precautions given and medications sent to pharmacy  All questions and concerns addressed    Jaye  CAROLYNE Laguerre MD        Electronically signed by Jaye Laguerre MD, 09/28/24, 8:16 AM EDT.

## 2024-09-30 ENCOUNTER — TRANSITIONAL CARE MANAGEMENT TELEPHONE ENCOUNTER (OUTPATIENT)
Dept: CALL CENTER | Facility: HOSPITAL | Age: 24
End: 2024-09-30
Payer: COMMERCIAL

## 2024-09-30 NOTE — OUTREACH NOTE
Call Center TCM Note      Flowsheet Row Responses   [facility] facility patient discharged from? [facility]   Does the patient have one of the following disease processes/diagnoses(primary or secondary)? General Surgery   TCM attempt successful? No   Unsuccessful attempts Attempt 2            Savanna Estes RN    9/30/2024, 14:23 EDT

## 2024-09-30 NOTE — OUTREACH NOTE
Call Center TCM Note      Flowsheet Row Responses   [facility] [place], operated by [facility] facility patient discharged from? [facility]   Does the patient have one of the following disease processes/diagnoses(primary or secondary)? General Surgery   TCM attempt successful? No   Unsuccessful attempts Attempt 1            Savanna Estes RN    9/30/2024, 08:15 EDT

## 2024-10-01 ENCOUNTER — TRANSITIONAL CARE MANAGEMENT TELEPHONE ENCOUNTER (OUTPATIENT)
Dept: CALL CENTER | Facility: HOSPITAL | Age: 24
End: 2024-10-01
Payer: COMMERCIAL

## 2024-10-01 LAB
CYTO UR: NORMAL
CYTO UR: NORMAL
LAB AP CASE REPORT: NORMAL
LAB AP CASE REPORT: NORMAL
LAB AP CLINICAL INFORMATION: NORMAL
LAB AP CLINICAL INFORMATION: NORMAL
PATH REPORT.FINAL DX SPEC: NORMAL
PATH REPORT.FINAL DX SPEC: NORMAL
PATH REPORT.GROSS SPEC: NORMAL
PATH REPORT.GROSS SPEC: NORMAL

## 2024-10-01 NOTE — OUTREACH NOTE
Call Center TCM Note      Flowsheet Row Responses   [facility] patient discharged from? [facility]   Does the patient have one of the following disease processes/diagnoses(primary or secondary)? General Surgery   TCM attempt successful? Yes  [VR for Lakisha Vowels]   Call start time 0830   Call end time 0836   Discharge diagnosis Encounter for postoperative care   Meds reviewed with patient/caregiver? Yes   Is the patient having any side effects they believe may be caused by any medication additions or changes? No   Does the patient have all medications related to this admission filled (includes all antibiotics, pain medications, etc.) Yes   Is the patient taking all medications as directed (includes completed medication regime)? Yes   Comments Pt will call to schedule appt with Dr. Marroquin   Does the patient have an appointment with their PCP within 7-14 days of discharge? No appointments available   Nursing Interventions Routed TCM call to PCP office   Has home health visited the patient within 72 hours of discharge? N/A   Psychosocial issues? No   Did the patient receive a copy of their discharge instructions? Yes   Nursing interventions Reviewed instructions with patient   What is the patient's perception of their health status since discharge? Improving  [Reports pain controlled with meds as ordered and has had postop BM since discharge.  Pt has no questions or concerns today.]   Nursing interventions Nurse provided patient education  [post op instructions reviewed]   Is the patient /caregiver able to teach back basic post-op care? Keep incision areas clean,dry and protected, No tub bath, swimming, or hot tub until instructed by MD, Lifting as instructed by MD in discharge instructions, Do not remove steri-strips, Continue use of incentive spirometry at least 1 week post discharge   Is the patient/caregiver able to teach back signs and symptoms of incisional infection? Increased redness, swelling or pain at  the incisonal site, Fever, Pus or odor from incision, Incisional warmth, Increased drainage or bleeding  [some bruising to incisions but otherwise no issues, reviewed site precautions]   Is the patient/caregiver able to teach back steps to recovery at home? Rest and rebuild strength, gradually increase activity   Is the patient/caregiver able to teach back the hierarchy of who to call/visit for symptoms/problems? PCP, Specialist, Home health nurse, Urgent Care, ED, 911 Yes   TCM call completed? Yes   Call end time 0836            Racquel Flores RN    10/1/2024, 08:38 EDT

## 2024-10-03 NOTE — PROGRESS NOTES
Spoke with patient. Verified .   Made aware to keep follow-up with GYN but no need to follow-up with us until afterwards if she needs us.

## 2024-10-18 ENCOUNTER — HOSPITAL ENCOUNTER (OUTPATIENT)
Facility: HOSPITAL | Age: 24
Discharge: HOME | End: 2024-10-21
Attending: EMERGENCY MEDICINE | Admitting: OBSTETRICS & GYNECOLOGY
Payer: COMMERCIAL

## 2024-10-18 ENCOUNTER — APPOINTMENT (OUTPATIENT)
Dept: CT IMAGING | Facility: HOSPITAL | Age: 24
End: 2024-10-18
Payer: COMMERCIAL

## 2024-10-18 DIAGNOSIS — Z48.89 ENCOUNTER FOR POSTOPERATIVE CARE: Primary | ICD-10-CM

## 2024-10-18 DIAGNOSIS — N94.89 PELVIC HEMATOMA IN FEMALE: ICD-10-CM

## 2024-10-18 PROBLEM — R10.9 ABDOMINAL PAIN: Status: ACTIVE | Noted: 2024-10-18

## 2024-10-18 LAB
ALBUMIN SERPL-MCNC: 4.1 G/DL (ref 3.5–5.2)
ALBUMIN/GLOB SERPL: 1.6 G/DL
ALP SERPL-CCNC: 78 U/L (ref 39–117)
ALT SERPL W P-5'-P-CCNC: 14 U/L (ref 1–33)
ANION GAP SERPL CALCULATED.3IONS-SCNC: 12.9 MMOL/L (ref 5–15)
AST SERPL-CCNC: 12 U/L (ref 1–32)
BASOPHILS # BLD AUTO: 0.09 10*3/MM3 (ref 0–0.2)
BASOPHILS NFR BLD AUTO: 0.5 % (ref 0–1.5)
BILIRUB SERPL-MCNC: 1.2 MG/DL (ref 0–1.2)
BILIRUB UR QL STRIP: NEGATIVE
BUN SERPL-MCNC: 14 MG/DL (ref 6–20)
BUN/CREAT SERPL: 20.3 (ref 7–25)
CALCIUM SPEC-SCNC: 8.9 MG/DL (ref 8.6–10.5)
CHLORIDE SERPL-SCNC: 105 MMOL/L (ref 98–107)
CLARITY UR: ABNORMAL
CO2 SERPL-SCNC: 21.1 MMOL/L (ref 22–29)
COLOR UR: ABNORMAL
CREAT SERPL-MCNC: 0.69 MG/DL (ref 0.57–1)
D-LACTATE SERPL-SCNC: 1 MMOL/L (ref 0.5–2)
DEPRECATED RDW RBC AUTO: 46.5 FL (ref 37–54)
DEPRECATED RDW RBC AUTO: 48.5 FL (ref 37–54)
EGFRCR SERPLBLD CKD-EPI 2021: 123.7 ML/MIN/1.73
EOSINOPHIL # BLD AUTO: 0.18 10*3/MM3 (ref 0–0.4)
EOSINOPHIL NFR BLD AUTO: 1 % (ref 0.3–6.2)
ERYTHROCYTE [DISTWIDTH] IN BLOOD BY AUTOMATED COUNT: 13.2 % (ref 12.3–15.4)
ERYTHROCYTE [DISTWIDTH] IN BLOOD BY AUTOMATED COUNT: 13.3 % (ref 12.3–15.4)
GLOBULIN UR ELPH-MCNC: 2.5 GM/DL
GLUCOSE SERPL-MCNC: 142 MG/DL (ref 65–99)
GLUCOSE UR STRIP-MCNC: NEGATIVE MG/DL
HCG INTACT+B SERPL-ACNC: <0.5 MIU/ML
HCT VFR BLD AUTO: 30.2 % (ref 34–46.6)
HCT VFR BLD AUTO: 31.7 % (ref 34–46.6)
HCT VFR BLD AUTO: 33.9 % (ref 34–46.6)
HGB BLD-MCNC: 10.8 G/DL (ref 12–15.9)
HGB BLD-MCNC: 11.3 G/DL (ref 12–15.9)
HGB BLD-MCNC: 9.9 G/DL (ref 12–15.9)
HGB UR QL STRIP.AUTO: NEGATIVE
HOLD SPECIMEN: NORMAL
HOLD SPECIMEN: NORMAL
IMM GRANULOCYTES # BLD AUTO: 0.07 10*3/MM3 (ref 0–0.05)
IMM GRANULOCYTES NFR BLD AUTO: 0.4 % (ref 0–0.5)
KETONES UR QL STRIP: ABNORMAL
LEUKOCYTE ESTERASE UR QL STRIP.AUTO: NEGATIVE
LIPASE SERPL-CCNC: 151 U/L (ref 13–60)
LYMPHOCYTES # BLD AUTO: 1.47 10*3/MM3 (ref 0.7–3.1)
LYMPHOCYTES NFR BLD AUTO: 8 % (ref 19.6–45.3)
MCH RBC QN AUTO: 32.7 PG (ref 26.6–33)
MCH RBC QN AUTO: 33.1 PG (ref 26.6–33)
MCHC RBC AUTO-ENTMCNC: 33.3 G/DL (ref 31.5–35.7)
MCHC RBC AUTO-ENTMCNC: 34.1 G/DL (ref 31.5–35.7)
MCV RBC AUTO: 96.1 FL (ref 79–97)
MCV RBC AUTO: 99.4 FL (ref 79–97)
MONOCYTES # BLD AUTO: 1.13 10*3/MM3 (ref 0.1–0.9)
MONOCYTES NFR BLD AUTO: 6.2 % (ref 5–12)
NEUTROPHILS NFR BLD AUTO: 15.35 10*3/MM3 (ref 1.7–7)
NEUTROPHILS NFR BLD AUTO: 83.9 % (ref 42.7–76)
NITRITE UR QL STRIP: NEGATIVE
NRBC BLD AUTO-RTO: 0 /100 WBC (ref 0–0.2)
PH UR STRIP.AUTO: 5.5 [PH] (ref 5–8)
PLATELET # BLD AUTO: 258 10*3/MM3 (ref 140–450)
PLATELET # BLD AUTO: 365 10*3/MM3 (ref 140–450)
PMV BLD AUTO: 9.4 FL (ref 6–12)
PMV BLD AUTO: 9.9 FL (ref 6–12)
POTASSIUM SERPL-SCNC: 3.7 MMOL/L (ref 3.5–5.2)
PROT SERPL-MCNC: 6.6 G/DL (ref 6–8.5)
PROT UR QL STRIP: ABNORMAL
RBC # BLD AUTO: 3.3 10*6/MM3 (ref 3.77–5.28)
RBC # BLD AUTO: 3.41 10*6/MM3 (ref 3.77–5.28)
SODIUM SERPL-SCNC: 139 MMOL/L (ref 136–145)
SP GR UR STRIP: >1.03 (ref 1–1.03)
UROBILINOGEN UR QL STRIP: ABNORMAL
WBC NRBC COR # BLD AUTO: 17.02 10*3/MM3 (ref 3.4–10.8)
WBC NRBC COR # BLD AUTO: 18.29 10*3/MM3 (ref 3.4–10.8)
WHOLE BLOOD HOLD COAG: NORMAL
WHOLE BLOOD HOLD SPECIMEN: NORMAL

## 2024-10-18 PROCEDURE — 96366 THER/PROPH/DIAG IV INF ADDON: CPT

## 2024-10-18 PROCEDURE — G0378 HOSPITAL OBSERVATION PER HR: HCPCS

## 2024-10-18 PROCEDURE — 83690 ASSAY OF LIPASE: CPT | Performed by: EMERGENCY MEDICINE

## 2024-10-18 PROCEDURE — 96375 TX/PRO/DX INJ NEW DRUG ADDON: CPT

## 2024-10-18 PROCEDURE — 96376 TX/PRO/DX INJ SAME DRUG ADON: CPT

## 2024-10-18 PROCEDURE — 99291 CRITICAL CARE FIRST HOUR: CPT

## 2024-10-18 PROCEDURE — 80053 COMPREHEN METABOLIC PANEL: CPT | Performed by: EMERGENCY MEDICINE

## 2024-10-18 PROCEDURE — 81003 URINALYSIS AUTO W/O SCOPE: CPT | Performed by: EMERGENCY MEDICINE

## 2024-10-18 PROCEDURE — 85018 HEMOGLOBIN: CPT | Performed by: OBSTETRICS & GYNECOLOGY

## 2024-10-18 PROCEDURE — 25810000003 SODIUM CHLORIDE 0.9 % SOLUTION 500 ML FLEX CONT: Performed by: OBSTETRICS & GYNECOLOGY

## 2024-10-18 PROCEDURE — 87040 BLOOD CULTURE FOR BACTERIA: CPT | Performed by: EMERGENCY MEDICINE

## 2024-10-18 PROCEDURE — 36415 COLL VENOUS BLD VENIPUNCTURE: CPT

## 2024-10-18 PROCEDURE — 74177 CT ABD & PELVIS W/CONTRAST: CPT

## 2024-10-18 PROCEDURE — 25010000002 HYDROMORPHONE 1 MG/ML SOLUTION: Performed by: OBSTETRICS & GYNECOLOGY

## 2024-10-18 PROCEDURE — 85014 HEMATOCRIT: CPT | Performed by: OBSTETRICS & GYNECOLOGY

## 2024-10-18 PROCEDURE — 25010000002 PIPERACILLIN SOD-TAZOBACTAM PER 1 G: Performed by: OBSTETRICS & GYNECOLOGY

## 2024-10-18 PROCEDURE — 99285 EMERGENCY DEPT VISIT HI MDM: CPT

## 2024-10-18 PROCEDURE — 25510000001 IOPAMIDOL PER 1 ML: Performed by: EMERGENCY MEDICINE

## 2024-10-18 PROCEDURE — 25010000002 HYDROMORPHONE 1 MG/ML SOLUTION: Performed by: EMERGENCY MEDICINE

## 2024-10-18 PROCEDURE — 83605 ASSAY OF LACTIC ACID: CPT | Performed by: EMERGENCY MEDICINE

## 2024-10-18 PROCEDURE — 84702 CHORIONIC GONADOTROPIN TEST: CPT | Performed by: EMERGENCY MEDICINE

## 2024-10-18 PROCEDURE — 96365 THER/PROPH/DIAG IV INF INIT: CPT

## 2024-10-18 PROCEDURE — 85027 COMPLETE CBC AUTOMATED: CPT | Performed by: OBSTETRICS & GYNECOLOGY

## 2024-10-18 PROCEDURE — 85025 COMPLETE CBC W/AUTO DIFF WBC: CPT | Performed by: EMERGENCY MEDICINE

## 2024-10-18 PROCEDURE — 25010000002 PIPERACILLIN SOD-TAZOBACTAM PER 1 G: Performed by: EMERGENCY MEDICINE

## 2024-10-18 PROCEDURE — 25010000002 ONDANSETRON PER 1 MG: Performed by: EMERGENCY MEDICINE

## 2024-10-18 RX ORDER — ONDANSETRON 4 MG/1
4 TABLET, ORALLY DISINTEGRATING ORAL EVERY 6 HOURS PRN
Status: DISCONTINUED | OUTPATIENT
Start: 2024-10-18 | End: 2024-10-21 | Stop reason: HOSPADM

## 2024-10-18 RX ORDER — ONDANSETRON 2 MG/ML
4 INJECTION INTRAMUSCULAR; INTRAVENOUS EVERY 6 HOURS PRN
Status: DISCONTINUED | OUTPATIENT
Start: 2024-10-18 | End: 2024-10-21 | Stop reason: HOSPADM

## 2024-10-18 RX ORDER — SODIUM CHLORIDE 0.9 % (FLUSH) 0.9 %
10 SYRINGE (ML) INJECTION AS NEEDED
Status: DISCONTINUED | OUTPATIENT
Start: 2024-10-18 | End: 2024-10-21 | Stop reason: HOSPADM

## 2024-10-18 RX ORDER — IOPAMIDOL 755 MG/ML
100 INJECTION, SOLUTION INTRAVASCULAR
Status: COMPLETED | OUTPATIENT
Start: 2024-10-18 | End: 2024-10-18

## 2024-10-18 RX ORDER — ONDANSETRON 2 MG/ML
4 INJECTION INTRAMUSCULAR; INTRAVENOUS ONCE
Status: COMPLETED | OUTPATIENT
Start: 2024-10-18 | End: 2024-10-18

## 2024-10-18 RX ADMIN — Medication 10 ML: at 19:33

## 2024-10-18 RX ADMIN — HYDROMORPHONE HYDROCHLORIDE 0.5 MG: 1 INJECTION, SOLUTION INTRAMUSCULAR; INTRAVENOUS; SUBCUTANEOUS at 15:23

## 2024-10-18 RX ADMIN — PIPERACILLIN AND TAZOBACTAM 3.38 G: 3; .375 INJECTION, POWDER, FOR SOLUTION INTRAVENOUS at 20:54

## 2024-10-18 RX ADMIN — HYDROMORPHONE HYDROCHLORIDE 1 MG: 1 INJECTION, SOLUTION INTRAMUSCULAR; INTRAVENOUS; SUBCUTANEOUS at 11:46

## 2024-10-18 RX ADMIN — HYDROMORPHONE HYDROCHLORIDE 0.5 MG: 1 INJECTION, SOLUTION INTRAMUSCULAR; INTRAVENOUS; SUBCUTANEOUS at 17:27

## 2024-10-18 RX ADMIN — HYDROMORPHONE HYDROCHLORIDE 1 MG: 1 INJECTION, SOLUTION INTRAMUSCULAR; INTRAVENOUS; SUBCUTANEOUS at 13:11

## 2024-10-18 RX ADMIN — HYDROMORPHONE HYDROCHLORIDE 0.5 MG: 1 INJECTION, SOLUTION INTRAMUSCULAR; INTRAVENOUS; SUBCUTANEOUS at 22:30

## 2024-10-18 RX ADMIN — PIPERACILLIN AND TAZOBACTAM 3.38 G: 3; .375 INJECTION, POWDER, FOR SOLUTION INTRAVENOUS at 13:14

## 2024-10-18 RX ADMIN — IOPAMIDOL 100 ML: 755 INJECTION, SOLUTION INTRAVENOUS at 12:01

## 2024-10-18 RX ADMIN — ONDANSETRON 4 MG: 2 INJECTION INTRAMUSCULAR; INTRAVENOUS at 11:46

## 2024-10-18 RX ADMIN — HYDROMORPHONE HYDROCHLORIDE 0.5 MG: 1 INJECTION, SOLUTION INTRAMUSCULAR; INTRAVENOUS; SUBCUTANEOUS at 19:34

## 2024-10-18 NOTE — PLAN OF CARE
Goal Outcome Evaluation:  Plan of Care Reviewed With: patient        Progress: no change  Outcome Evaluation: Patient admitted to room for Abdominal pain, oriented to room and surroundings, all questions answered, call light in reach, orders processed, vital signs stable, will continue with plan of care.

## 2024-10-18 NOTE — ED PROVIDER NOTES
Time: 11:41 AM EDT  Date of encounter:  10/18/2024  Independent Historian/Clinical History and Information was obtained by:   Patient    History is limited by: N/A    Chief Complaint: Abdominal pain      History of Present Illness:  Patient is a 24 y.o. year old female who presents to the emergency department for evaluation of abdominal pain.  Patient recently had laparoscopic surgery for hemoperitoneum due to ovarian cyst rupture.      Patient Care Team  Primary Care Provider: Padmini Hernández APRN    Past Medical History:     Allergies   Allergen Reactions    Oxycodone-Acetaminophen Itching     Past Medical History:   Diagnosis Date    Adnexal cyst 03/31/2015    LEFT     Bladder problem 03/08/2023    IIH (idiopathic intracranial hypertension) 10/10/2018    THE PATIENTS CLINICAL HISTORY AND EXAM ARE MOST CONSISTENT WITH IDIOPATHIC INTRACRANIAL HYPERTENSION. SHE STARTED HER FIRST DOSE OF DIAMOX TODAY. I WILL PURSUE A CT HEAD. I WILL PURSUE AN UGENT LUMBAR PUNCTURE. I WILL CHECK LABS. I WILL PURSUE AN MRV. I DID DISCUSS PURSUING A LUMBAR PUNCTURE TODAY BUT THE PATIENT DEFFERED. WE WILL ATTEMPT TO  ARRANGE THIS TOMORROW. I INSTRUCTED THE PATIENT TO SEE     Mass of uterine adnexa 03/31/2015    Post lumbar puncture headache 12/10/2016    Renal calculus 03/08/2023    Ureteral calculus 03/31/2015    RIGHT     Past Surgical History:   Procedure Laterality Date    DIAGNOSTIC LAPAROSCOPY, SALPINGO OOPHORECTOMY LAPAROSCOPIC Right 9/27/2024    Procedure: DIAGNOSTIC LAPAROSCOPY, RIGHT OVARIAN CYSTECTOMY, EVACUATION OF HEMOPERITONEUM;  Surgeon: Rachelle Marroquin MD;  Location: [facility] OR;  Service: Gynecology;  Laterality: Right;    HX OVARIAN CYSTECTOMY  2015    LUMBAR PUNCTURE      TONSILLECTOMY       Family History   Problem Relation Age of Onset    Diabetes Mother     Ulcerative colitis Father     Crohn's disease Father     Arthritis Father     Urolithiasis Other        Home Medications:  Prior to Admission  medications    Medication Sig Start Date End Date Taking? Authorizing Provider   amphetamine-dextroamphetamine XR (ADDERALL XR) 20 MG 24 hr capsule Take 1 capsule by mouth Daily 9/5/24   Provider, MD Johnnie   docusate sodium 100 MG capsule Take 1 capsule by mouth Daily As Needed (constipation) for up to 30 days. 9/28/24 10/28/24  Jaye Laguerre MD   ferrous sulfate 325 (65 FE) MG tablet Take 1 tablet by mouth 3 (Three) Times a Week. 9/30/24   Jaye Laguerre MD   HYDROcodone-acetaminophen (Norco) 5-325 MG per tablet Take 1 tablet by mouth Every 6 (Six) Hours As Needed for Moderate Pain or Severe Pain. 9/28/24   Jaye Laguerre MD   ibuprofen (ADVIL,MOTRIN) 600 MG tablet Take 1 tablet by mouth Every 6 (Six) Hours As Needed for Mild Pain. 9/28/24   Jaye Laguerre MD   Levonorgestrel (Liletta, 52 MG,) 19.5 MCG/DAY intrauterine device     Emergency, Nurse [facility], RN        Social History:   Social History     Tobacco Use    Smoking status: Every Day     Current packs/day: 0.75     Types: Cigarettes    Smokeless tobacco: Never   Vaping Use    Vaping status: Never Used   Substance Use Topics    Alcohol use: Yes     Alcohol/week: 5.0 standard drinks of alcohol     Types: 5 Cans of beer per week     Comment: occ    Drug use: Yes     Frequency: 7.0 times per week     Types: Marijuana         Review of Systems:  Review of Systems   Constitutional:  Negative for chills and fever.   HENT:  Negative for congestion, rhinorrhea and sore throat.    Eyes:  Negative for pain and visual disturbance.   Respiratory:  Negative for apnea, cough, chest tightness and shortness of breath.    Cardiovascular:  Negative for chest pain and palpitations.   Gastrointestinal:  Positive for abdominal pain. Negative for diarrhea, nausea and vomiting.   Genitourinary:  Negative for difficulty urinating and dysuria.   Musculoskeletal:  Negative for joint swelling and myalgias.   Skin:  Negative for color change.   Neurological:  Negative for seizures  "and headaches.   Psychiatric/Behavioral: Negative.     All other systems reviewed and are negative.       Physical Exam:  /70 (BP Location: Right arm, Patient Position: Lying)   Pulse 80   Temp 97.8 °F (36.6 °C) (Oral)   Resp 18   Ht 157.5 cm (62\")   Wt 71.4 kg (157 lb 6.5 oz)   LMP 09/27/2024 (Approximate)   SpO2 96%   BMI 28.79 kg/m²     Physical Exam  Vitals and nursing note reviewed.   Constitutional:       General: She is not in acute distress.     Appearance: Normal appearance. She is not toxic-appearing.   HENT:      Head: Normocephalic and atraumatic.      Jaw: There is normal jaw occlusion.   Eyes:      General: Lids are normal.      Extraocular Movements: Extraocular movements intact.      Conjunctiva/sclera: Conjunctivae normal.      Pupils: Pupils are equal, round, and reactive to light.   Cardiovascular:      Rate and Rhythm: Normal rate and regular rhythm.      Pulses: Normal pulses.      Heart sounds: Normal heart sounds.   Pulmonary:      Effort: Pulmonary effort is normal. No respiratory distress.      Breath sounds: Normal breath sounds. No wheezing or rhonchi.   Abdominal:      General: Abdomen is flat.      Palpations: Abdomen is soft.      Tenderness: There is abdominal tenderness in the right lower quadrant, suprapubic area and left lower quadrant. There is no guarding or rebound.   Musculoskeletal:         General: Normal range of motion.      Cervical back: Normal range of motion and neck supple.      Right lower leg: No edema.      Left lower leg: No edema.   Skin:     General: Skin is warm and dry.   Neurological:      Mental Status: She is alert and oriented to person, place, and time. Mental status is at baseline.   Psychiatric:         Mood and Affect: Mood normal.                  Procedures:  Procedures      Medical Decision Making:      Comorbidities that affect care:    Anxiety, pseudotumor cerebri, ovarian cyst    External Notes reviewed:    Hospital Discharge Summary: " After admission for hemoperitoneum management due to ruptured ovarian cyst      The following orders were placed and all results were independently analyzed by me:  Orders Placed This Encounter   Procedures    Blood Culture - Blood,    Blood Culture - Blood,    CT Abdomen Pelvis With Contrast    Alto Draw    Comprehensive Metabolic Panel    Lipase    Urinalysis With Microscopic If Indicated (No Culture) - Urine, Clean Catch    hCG, Quantitative, Pregnancy    CBC Auto Differential    Lactic Acid, Plasma    NPO Diet NPO Type: Strict NPO    Undress & Gown    OB / GYN (on-call MD unless specified)    Insert Peripheral IV    CBC & Differential    Green Top (Gel)    Lavender Top    Gold Top - SST    Light Blue Top       Medications Given in the Emergency Department:  Medications   sodium chloride 0.9 % flush 10 mL (has no administration in time range)   HYDROmorphone (DILAUDID) injection 1 mg (1 mg Intravenous Given 10/18/24 1146)   ondansetron (ZOFRAN) injection 4 mg (4 mg Intravenous Given 10/18/24 1146)   iopamidol (ISOVUE-370) 76 % injection 100 mL (100 mL Intravenous Given 10/18/24 1201)   piperacillin-tazobactam (ZOSYN) IVPB 3.375 g IVPB in 100 mL NS (VTB) (3.375 g Intravenous New Bag 10/18/24 1314)   HYDROmorphone (DILAUDID) injection 1 mg (1 mg Intravenous Given 10/18/24 1311)        ED Course:         Labs:    Lab Results (last 24 hours)       Procedure Component Value Units Date/Time    CBC & Differential [[id number]]  (Abnormal) Collected: 10/18/24 1035    Specimen: Blood Updated: 10/18/24 1046    Narrative:      The following orders were created for panel order CBC & Differential.  Procedure                               Abnormality         Status                     ---------                               -----------         ------                     CBC Auto Differential[[id number]]        Abnormal            Final result                 Please view results for these tests on the individual orders.     Comprehensive Metabolic Panel [[id number]]  (Abnormal) Collected: 10/18/24 1035    Specimen: Blood Updated: 10/18/24 1106     Glucose 142 mg/dL      BUN 14 mg/dL      Creatinine 0.69 mg/dL      Sodium 139 mmol/L      Potassium 3.7 mmol/L      Chloride 105 mmol/L      CO2 21.1 mmol/L      Calcium 8.9 mg/dL      Total Protein 6.6 g/dL      Albumin 4.1 g/dL      ALT (SGPT) 14 U/L      AST (SGOT) 12 U/L      Alkaline Phosphatase 78 U/L      Total Bilirubin 1.2 mg/dL      Globulin 2.5 gm/dL      A/G Ratio 1.6 g/dL      BUN/Creatinine Ratio 20.3     Anion Gap 12.9 mmol/L      eGFR 123.7 mL/min/1.73     Narrative:      GFR Normal >60  Chronic Kidney Disease <60  Kidney Failure <15      Lipase [[id number]]  (Abnormal) Collected: 10/18/24 1035    Specimen: Blood Updated: 10/18/24 1106     Lipase 151 U/L     hCG, Quantitative, Pregnancy [[id number]] Collected: 10/18/24 1035    Specimen: Blood Updated: 10/18/24 1108     HCG Quantitative <0.50 mIU/mL     Narrative:      HCG Ranges by Gestational Age    Females - non-pregnant premenopausal   </= 1mIU/mL HCG  Females - postmenopausal               </= 7mIU/mL HCG    3 Weeks       5.4   -      72 mIU/mL  4 Weeks      10.2   -     708 mIU/mL  5 Weeks       217   -   8,245 mIU/mL  6 Weeks       152   -  32,177 mIU/mL  7 Weeks     4,059   - 153,767 mIU/mL  8 Weeks    31,366   - 149,094 mIU/mL  9 Weeks    59,109   - 135,901 mIU/mL  10 Weeks   44,186   - 170,409 mIU/mL  12 Weeks   27,107   - 201,615 mIU/mL  14 Weeks   24,302   -  93,646 mIU/mL  15 Weeks   12,540   -  69,747 mIU/mL  16 Weeks    8,904   -  55,332 mIU/mL  17 Weeks    8,240   -  51,793 mIU/mL  18 Weeks    9,649   -  55,271 mIU/mL      CBC Auto Differential [[id number]]  (Abnormal) Collected: 10/18/24 1035    Specimen: Blood Updated: 10/18/24 1046     WBC 18.29 10*3/mm3      RBC 3.41 10*6/mm3      Hemoglobin 11.3 g/dL      Hematocrit 33.9 %      MCV 99.4 fL      MCH 33.1 pg      MCHC 33.3 g/dL      RDW 13.3 %      RDW-SD 48.5  fl      MPV 9.4 fL      Platelets 365 10*3/mm3      Neutrophil % 83.9 %      Lymphocyte % 8.0 %      Monocyte % 6.2 %      Eosinophil % 1.0 %      Basophil % 0.5 %      Immature Grans % 0.4 %      Neutrophils, Absolute 15.35 10*3/mm3      Lymphocytes, Absolute 1.47 10*3/mm3      Monocytes, Absolute 1.13 10*3/mm3      Eosinophils, Absolute 0.18 10*3/mm3      Basophils, Absolute 0.09 10*3/mm3      Immature Grans, Absolute 0.07 10*3/mm3      nRBC 0.0 /100 WBC     Urinalysis With Microscopic If Indicated (No Culture) - Urine, Clean Catch [[id number]]  (Abnormal) Collected: 10/18/24 1154    Specimen: Urine, Clean Catch Updated: 10/18/24 1202     Color, UA Dark Yellow     Appearance, UA Turbid     pH, UA 5.5     Specific Gravity, UA >1.030     Glucose, UA Negative     Ketones, UA Trace     Bilirubin, UA Negative     Blood, UA Negative     Protein, UA Trace     Leuk Esterase, UA Negative     Nitrite, UA Negative     Urobilinogen, UA 1.0 E.U./dL    Narrative:      Urine microscopic not indicated.    Lactic Acid, Plasma [[id number]]  (Normal) Collected: 10/18/24 1310    Specimen: Blood Updated: 10/18/24 1337     Lactate 1.0 mmol/L     Blood Culture - Blood, Arm, Left [[id number]] Collected: 10/18/24 1310    Specimen: Blood from Arm, Left Updated: 10/18/24 1314    Blood Culture - Blood, Arm, Left [[id number]] Collected: 10/18/24 1312    Specimen: Blood from Arm, Left Updated: 10/18/24 1320             Imaging:    CT Abdomen Pelvis With Contrast    Result Date: 10/18/2024  CT ABDOMEN PELVIS W CONTRAST Date of Exam: 10/18/2024 11:56 AM EDT Indication: abd pain, recent ovarian surgery Abdominal pain. Comparison: 9/27/2024 Technique: Axial CT images were obtained of the abdomen and pelvis after the uneventful intravenous administration of iodinated contrast. Reconstructed coronal and sagittal images were also obtained. Automated exposure control and iterative construction methods were used. Findings: Lower Chest: Lung bases clear  Organs: Liver, spleen, pancreas, kidneys, adrenal glands, gallbladder unremarkable other than small left renal cyst Gastrointestinal: Diverticula of the descending and sigmoid colon. No intestinal distention or evident wall thickening Pelvis: Interval resection of right ovarian dermoid. Ill-defined high density structure in the pelvis superior to the uterus and extending toward the right, measuring up to approximately 10 cm. No extravasation of intravenous contrast. Free fluid in the pelvis that demonstrates density greater than simple fluid. Uterus unremarkable with IUD in good position. Neither ovary clearly visualized. Urinary bladder unremarkable. No extraperitoneal pelvic fluid Peritoneum/Retroperitoneum: Free fluid in the abdomen that demonstrate density greater than simple fluid. No pneumoperitoneum. Unremarkable aorta Bones/Soft Tissues: No acute bony abnormality. Expected changes related to a surgical access site in the left lower quadrant     There has been 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 There is an approximately 10 cm pelvic hematoma, with abdominal and pelvic hemoperitoneum. The overall appearance is noted to be very similar to the comparison (preoperative) study, but is presumed to be recurrent as there would likely have been liquefaction of the pelvic hematoma and resorption of some of the hemoperitoneum over the 3 weeks that have passed since the procedure Electronically Signed: Allen Daley  10/18/2024 12:24 PM EDT  Workstation ID: [id number]       Differential Diagnosis and Discussion:    Abdominal Pain: Based on the patient's signs and symptoms, I considered abdominal aortic aneurysm, small bowel obstruction, pancreatitis, acute cholecystitis, acute appendecitis, peptic ulcer disease, gastritis, colitis, endocrine disorders, irritable bowel syndrome and other differential diagnosis an etiology of the patient's abdominal pain.    All labs were reviewed and interpreted by me.  CT scan  radiology impression was interpreted by me.    MDM  Number of Diagnoses or Management Options  Pelvic hematoma in female  Diagnosis management comments: In summary this is a 24-year-old female who presents to the emerged department for evaluation of abdominal pain.  Approximately 3 weeks ago she had similar pain that required operative intervention due to hemoperitoneum and dermoid cyst.  Patient states pain feels identical.  CBC independently reviewed and interpreted by me and shows no critical abnormalities of leukocytosis.  CMP independently reviewed and interpreted by me and shows no critical abnormalities.  CT scan abdomen pelvis shows some hemoperitoneum and proximately 10 cm pelvic hematoma.  Discussed finding with GYN hospitalist on-call who plans for admission and possible operative evacuation.       Amount and/or Complexity of Data Reviewed  Clinical lab tests: reviewed  Tests in the radiology section of CPT®: reviewed             Total Critical Care time of 35 minutes. Total critical care time documented does not include time spent on separately billed procedures for services of nurses or physician assistants. I personally saw and examined the patient. I have reviewed all diagnostic interpretations and treatment plans as written. I was present for the key portions of any procedures performed and the inclusive time noted in any critical care statement. Critical care time includes patient management by me, reevaluations due to hemoperitoneum, time spent at the patients bedside,  time to review lab and imaging results, discussing patient care, documentation in the medical record, and time spent with family or caregiver.          Patient Care Considerations:    SEPSIS IS NOT PRESENT IN THE EMERGENCY DEPARTMENT: Patient meets SIRS criteria in the emergency department however the patient does not have a known source of bacterial infection to confirm the diagnosis of sepsis.        Consultants/Shared Management  Plan:    Consultant: I have discussed the case with Dr. Castro who states will admit to the hospital    Social Determinants of Health:    Patient is independent, reliable, and has access to care.       Disposition and Care Coordination:    Admit:   Through independent evaluation of the patient's history, physical, and imperical data, the patient meets criteria for inpatient admission to the hospital.        Final diagnoses:   Pelvic hematoma in female        ED Disposition       ED Disposition   Decision to Admit    Condition   --    Comment   --               This medical record created using voice recognition software.             Kwasi Castro MD  10/18/24 [id number]

## 2024-10-18 NOTE — H&P
Date: 10/18/2024   Patient Name: Janey Wakefield  : 2000  MRN: [id number]  Primary Care Physician:  Padmini Hernández APRN  Referring Physician: No ref. provider found  Date of admission: 10/18/2024      Patient Care Team:  Padmini Hernández APRN as PCP - General (Internal Medicine & Pediatrics)  Daysi Koroma MD as Consulting Physician (Hospitalist)  Jaye Laguerre MD as Obstetrician (Obstetrics and Gynecology)    Chief complaint abdominal pain    History of present illness:  25yo s/p R ov cystectomy and oophorectomy and evacuation of hemoperitoneum on .  Recovered well and released to normal activity this past Monday.  Starting Wednesday, pt started having discomfort in her abd and thought it was constipation or her period getting ready to start.  The pain got a lot worse and she came into ER     Review of Systems  Pertinent items are noted in HPI    Review of Systems   Constitutional:  Negative for chills and fever.   Respiratory:  Negative for shortness of breath.    Cardiovascular:  Negative for chest pain.   Gastrointestinal:  Positive for abdominal pain, constipation, nausea and vomiting.   Genitourinary:  Positive for pelvic pain. Negative for difficulty urinating, dysuria, hematuria and vaginal bleeding.        History:  Past Medical History:   Diagnosis Date    Adnexal cyst 2015    LEFT     Bladder problem 2023    IIH (idiopathic intracranial hypertension) 10/10/2018    THE PATIENTS CLINICAL HISTORY AND EXAM ARE MOST CONSISTENT WITH IDIOPATHIC INTRACRANIAL HYPERTENSION. SHE STARTED HER FIRST DOSE OF DIAMOX TODAY. I WILL PURSUE A CT HEAD. I WILL PURSUE AN UGENT LUMBAR PUNCTURE. I WILL CHECK LABS. I WILL PURSUE AN MRV. I DID DISCUSS PURSUING A LUMBAR PUNCTURE TODAY BUT THE PATIENT DEFFERED. WE WILL ATTEMPT TO  ARRANGE THIS TOMORROW. I INSTRUCTED THE PATIENT TO SEE     Mass of uterine adnexa 2015    Post lumbar puncture headache 12/10/2016    Renal calculus  03/08/2023    Ureteral calculus 03/31/2015    RIGHT   ,   Past Surgical History:   Procedure Laterality Date    DIAGNOSTIC LAPAROSCOPY, SALPINGO OOPHORECTOMY LAPAROSCOPIC Right 9/27/2024    Procedure: DIAGNOSTIC LAPAROSCOPY, RIGHT OVARIAN CYSTECTOMY, EVACUATION OF HEMOPERITONEUM;  Surgeon: Rachelle Marroquin MD;  Location: [facility] MAIN OR;  Service: Gynecology;  Laterality: Right;    HX OVARIAN CYSTECTOMY  2015    LUMBAR PUNCTURE      TONSILLECTOMY     ,   Family History   Problem Relation Age of Onset    Diabetes Mother     Ulcerative colitis Father     Crohn's disease Father     Arthritis Father     Urolithiasis Other    ,   Social History     Socioeconomic History    Marital status: Single   Tobacco Use    Smoking status: Every Day     Current packs/day: 0.75     Types: Cigarettes    Smokeless tobacco: Never   Vaping Use    Vaping status: Never Used   Substance and Sexual Activity    Alcohol use: Yes     Alcohol/week: 5.0 standard drinks of alcohol     Types: 5 Cans of beer per week     Comment: occ    Drug use: Yes     Frequency: 7.0 times per week     Types: Marijuana    Sexual activity: Yes     Birth control/protection: I.U.D.     Comment: Lyletta     E-cigarette/Vaping    E-cigarette/Vaping Use Never User      E-cigarette/Vaping Substances     Medications Prior to Admission   Medication Sig Dispense Refill Last Dose/Taking    amphetamine-dextroamphetamine XR (ADDERALL XR) 20 MG 24 hr capsule Take 1 capsule by mouth Daily   Past Week    docusate sodium 100 MG capsule Take 1 capsule by mouth Daily As Needed (constipation) for up to 30 days. 30 capsule 0 10/17/2024 Evening    ferrous sulfate 325 (65 FE) MG tablet Take 1 tablet by mouth 3 (Three) Times a Week. (Patient taking differently: Take 1 tablet by mouth 3 (Three) Times a Week. Per patient she takes on Random days) 12 tablet 1 Past Week    HYDROcodone-acetaminophen (Norco) 5-325 MG per tablet Take 1 tablet by mouth Every 6 (Six) Hours As Needed for  Moderate Pain or Severe Pain. 10 tablet 0 Taking As Needed    ibuprofen (ADVIL,MOTRIN) 600 MG tablet Take 1 tablet by mouth Every 6 (Six) Hours As Needed for Mild Pain. 30 tablet 0 10/17/2024 Evening    Levonorgestrel (Liletta, 52 MG,) 19.5 MCG/DAY intrauterine device           Scheduled Meds:  piperacillin-tazobactam, 3.375 g, Intravenous, Q8H      Continuous Infusions:     PRN Meds:    HYDROmorphone    ondansetron ODT **OR** ondansetron    sodium chloride  Allergies:  Oxycodone-acetaminophen    Objective     Vital Signs   Temp:  [97.5 °F (36.4 °C)-98.5 °F (36.9 °C)] 98.5 °F (36.9 °C)  Heart Rate:  [80-90] 81  Resp:  [18] 18  BP: (100-118)/(55-71) 118/60    Physical Exam:    Physical Exam  Constitutional:       Appearance: She is normal weight.      Comments: Ranulfo uncomfortable   Cardiovascular:      Rate and Rhythm: Normal rate.   Pulmonary:      Effort: Pulmonary effort is normal.   Abdominal:      General: There is no distension.      Palpations: Abdomen is soft. There is no mass.      Tenderness: There is abdominal tenderness (generalized). There is no guarding or rebound.   Neurological:      Mental Status: She is alert.          Result Review    Result Review:  I have personally reviewed the following results and agree with these findings:  [x]  Laboratory  []  Microbiology  [x]  Radiology  []  EKG/Telemetry   []  Cardiology/Vascular   []  Pathology  []  Old records  []  Other:      Assessment & Plan       Abdominal pain  Abdominal mass - hematoma (10cm)    Plan: will admit for serial H/H and abx as WBC is 18;  consider IR drainage tomorrow if stable today;  will get coags with am H/H    I discussed the patients findings and my recommendations with patient, nursing staff, and consulting provider.    Electronically signed by Savanna Csatro MD, 10/18/24, 4:40 PM EDT.

## 2024-10-18 NOTE — PROGRESS NOTES
"  Subjective     Subjective  Patient reports:  Pain is manageable until her abdomen is manipulated  Objective     Objective:  Vital signs (most recent): Blood pressure 118/60, pulse 81, temperature 98.5 °F (36.9 °C), temperature source Oral, resp. rate 18, height 157.5 cm (62\"), weight 61.9 kg (136 lb 6.4 oz), last menstrual period 09/27/2024, SpO2 97%, not currently breastfeeding.    Physical Exam:  Incision:    Lungs: normal and clear to auscultation   Abdomen: Hypoactive bowel sounds, soft, tender to moderate palpation unable to palpate any masses   Extremities:  extremities normal, atraumatic, no cyanosis or edema   Skin  Warm dry and intact       Vital Sign Min/Max    Temp  Min: 97.5 °F (36.4 °C)  Max: 98.5 °F (36.9 °C)   Pulse  Min: 80  Max: 90   Resp  Min: 18  Max: 18   BP  Min: 100/71  Max: 118/60   Weight  Min: 61.9 kg (136 lb 6.4 oz)  Max: 71.4 kg (157 lb 6.5 oz)   SpO2  Min: 96 %  Max: 97 %   No data recorded     Flowsheet Rows      Flowsheet Row First Filed Value   Admission Height 157.5 cm (62\") Documented at 10/18/2024 1008   Admission Weight 71.4 kg (157 lb 6.5 oz) Documented at 10/18/2024 1008            Intake/Output last 24 hours:  No intake or output data in the 24 hours ending 10/18/24 1931    Intake/Output this shift:  No intake/output data recorded.    Labs/Studies reviewed:  Yes   Radiology studies reviewed:   Yes   Medications reviewed:   Yes     Assessment & Plan   Diagnoses and all orders for this visit:    1. Pelvic hematoma in female (Primary)    Other orders  -     Cancel: NPO Diet NPO Type: Strict NPO; Standing  -     Undress & Gown; Standing  -     Insert Peripheral IV; Standing  -     sodium chloride 0.9 % flush 10 mL  -     [place] Draw; Standing  -     CBC & Differential; Standing  -     Comprehensive Metabolic Panel; Standing  -     Lipase; Standing  -     Urinalysis With Microscopic If Indicated (No Culture) - Urine, Clean Catch; Standing  -     hCG, Quantitative, Pregnancy; " Standing  -     Cancel: NPO Diet NPO Type: Strict NPO  -     Undress & Gown  -     Insert Peripheral IV  -     [place] Draw  -     CBC & Differential  -     Comprehensive Metabolic Panel  -     Lipase  -     Urinalysis With Microscopic If Indicated (No Culture) - Urine, Clean Catch  -     hCG, Quantitative, Pregnancy  -     HYDROmorphone (DILAUDID) injection 1 mg  -     ondansetron (ZOFRAN) injection 4 mg  -     CT Abdomen Pelvis With Contrast; Standing  -     CT Abdomen Pelvis With Contrast  -     iopamidol (ISOVUE-370) 76 % injection 100 mL  -     OB / GYN (on-call MD unless specified); Standing  -     OB / GYN (on-call MD unless specified)  -     piperacillin-tazobactam (ZOSYN) IVPB 3.375 g IVPB in 100 mL NS (VTB)  -     Lactic Acid, Plasma; Standing  -     Blood Culture - Blood,; Standing  -     Blood Culture - Blood,; Standing  -     Lactic Acid, Plasma  -     Blood Culture - Blood, Arm, Left  -     Blood Culture - Blood, Arm, Left  -     HYDROmorphone (DILAUDID) injection 1 mg  -     Initiate Observation Status; Standing  -     Vital Signs; Standing  -     Intake & Output; Standing  -     Notify Provider; Standing  -     VTE Prophylaxis Not Indicated: Low Risk; No Risk Factors (0); Standing  -     Activity - Ad Lachelle; Standing  -     NPO Diet NPO Type: Strict NPO; Standing  -     CBC (No Diff); Standing  -     Discontinue: piperacillin-tazobactam (ZOSYN) IVPB 3.375 g IVPB in 100 mL NS (VTB)  -     piperacillin-tazobactam (ZOSYN) IVPB 3.375 g IVPB in 100 mL NS (VTB)  -     HYDROmorphone (DILAUDID) injection 0.5 mg  -     ondansetron ODT (ZOFRAN-ODT) disintegrating tablet 4 mg  -     ondansetron (ZOFRAN) injection 4 mg  -     Initiate Observation Status  -     Vital Signs  -     Vital Signs  -     Vital Signs  -     Intake & Output  -     Notify Provider  -     VTE Prophylaxis Not Indicated: Low Risk; No Risk Factors (0)  -     Activity - Ad Lachelle  -     NPO Diet NPO Type: Strict NPO  -     CBC (No Diff)  -      Hemoglobin & Hematocrit, Blood; Standing  -     Hemoglobin & Hematocrit, Blood        All questions answered.      Abdominal pain  Chart has been reviewed and case discussed with Dr. Urena.  Currently the patient is resting comfortably and does not appear to be toxic.  Differential diagnosis include postoperative hematoma, abscess, postoperative changes related to placement of Nu-Knit over the surgical site; a new ovarian hemorrhagic cyst patient.  Patient also may have a concurrent urinary tract infection.  Patient is stable.  H&H is stable.  Continue close surveillance, serial H&H, IV antibiotics would appreciate input from interventional radiology.  Continue supportive care                    Rachelle Marroquin MD  10/18/24  19:31 EDT

## 2024-10-19 ENCOUNTER — ANESTHESIA EVENT (OUTPATIENT)
Dept: PERIOP | Facility: HOSPITAL | Age: 24
End: 2024-10-19
Payer: COMMERCIAL

## 2024-10-19 ENCOUNTER — PREP FOR SURGERY (OUTPATIENT)
Dept: OTHER | Facility: HOSPITAL | Age: 24
End: 2024-10-19
Payer: COMMERCIAL

## 2024-10-19 ENCOUNTER — ANESTHESIA (OUTPATIENT)
Dept: PERIOP | Facility: HOSPITAL | Age: 24
End: 2024-10-19
Payer: COMMERCIAL

## 2024-10-19 DIAGNOSIS — N94.89 PELVIC HEMATOMA IN FEMALE: Primary | ICD-10-CM

## 2024-10-19 PROBLEM — N83.209 HEMORRHAGIC OVARIAN CYST: Status: ACTIVE | Noted: 2024-10-19

## 2024-10-19 PROBLEM — N83.209 HEMORRHAGIC OVARIAN CYST: Status: RESOLVED | Noted: 2024-10-19 | Resolved: 2024-10-19

## 2024-10-19 LAB
ABO GROUP BLD: NORMAL
APTT PPP: 33.3 SECONDS (ref 24.2–34.2)
B-HCG UR QL: NEGATIVE
BASOPHILS # BLD AUTO: 0.05 10*3/MM3 (ref 0–0.2)
BASOPHILS NFR BLD AUTO: 0.4 % (ref 0–1.5)
BLD GP AB SCN SERPL QL: NEGATIVE
DEPRECATED RDW RBC AUTO: 48.3 FL (ref 37–54)
EOSINOPHIL # BLD AUTO: 0.19 10*3/MM3 (ref 0–0.4)
EOSINOPHIL NFR BLD AUTO: 1.4 % (ref 0.3–6.2)
ERYTHROCYTE [DISTWIDTH] IN BLOOD BY AUTOMATED COUNT: 13.2 % (ref 12.3–15.4)
FERRITIN SERPL-MCNC: 227 NG/ML (ref 13–150)
HCT VFR BLD AUTO: 28 % (ref 34–46.6)
HCT VFR BLD AUTO: 29.5 % (ref 34–46.6)
HGB BLD-MCNC: 9.3 G/DL (ref 12–15.9)
HGB BLD-MCNC: 9.8 G/DL (ref 12–15.9)
HOLD SPECIMEN: NORMAL
IMM GRANULOCYTES # BLD AUTO: 0.08 10*3/MM3 (ref 0–0.05)
IMM GRANULOCYTES NFR BLD AUTO: 0.6 % (ref 0–0.5)
INR PPP: 1.17 (ref 0.86–1.15)
IRON 24H UR-MRATE: 76 MCG/DL (ref 37–145)
IRON SATN MFR SERPL: 27 % (ref 20–50)
LYMPHOCYTES # BLD AUTO: 1.64 10*3/MM3 (ref 0.7–3.1)
LYMPHOCYTES NFR BLD AUTO: 12.5 % (ref 19.6–45.3)
MCH RBC QN AUTO: 33.2 PG (ref 26.6–33)
MCHC RBC AUTO-ENTMCNC: 33.2 G/DL (ref 31.5–35.7)
MCV RBC AUTO: 100 FL (ref 79–97)
MONOCYTES # BLD AUTO: 0.89 10*3/MM3 (ref 0.1–0.9)
MONOCYTES NFR BLD AUTO: 6.8 % (ref 5–12)
NEUTROPHILS NFR BLD AUTO: 10.32 10*3/MM3 (ref 1.7–7)
NEUTROPHILS NFR BLD AUTO: 78.3 % (ref 42.7–76)
NRBC BLD AUTO-RTO: 0 /100 WBC (ref 0–0.2)
PLATELET # BLD AUTO: 270 10*3/MM3 (ref 140–450)
PMV BLD AUTO: 9.3 FL (ref 6–12)
PROTHROMBIN TIME: 15.1 SECONDS (ref 11.8–14.9)
RBC # BLD AUTO: 2.8 10*6/MM3 (ref 3.77–5.28)
RH BLD: POSITIVE
T&S EXPIRATION DATE: NORMAL
TIBC SERPL-MCNC: 277 MCG/DL (ref 298–536)
TRANSFERRIN SERPL-MCNC: 186 MG/DL (ref 200–360)
WBC NRBC COR # BLD AUTO: 13.17 10*3/MM3 (ref 3.4–10.8)

## 2024-10-19 PROCEDURE — 25010000002 PROPOFOL 10 MG/ML EMULSION: Performed by: ANESTHESIOLOGY

## 2024-10-19 PROCEDURE — 81025 URINE PREGNANCY TEST: CPT | Performed by: OBSTETRICS & GYNECOLOGY

## 2024-10-19 PROCEDURE — G0378 HOSPITAL OBSERVATION PER HR: HCPCS

## 2024-10-19 PROCEDURE — 96366 THER/PROPH/DIAG IV INF ADDON: CPT

## 2024-10-19 PROCEDURE — 82728 ASSAY OF FERRITIN: CPT | Performed by: OBSTETRICS & GYNECOLOGY

## 2024-10-19 PROCEDURE — 25010000002 KETOROLAC TROMETHAMINE PER 15 MG: Performed by: OBSTETRICS & GYNECOLOGY

## 2024-10-19 PROCEDURE — 85014 HEMATOCRIT: CPT | Performed by: OBSTETRICS & GYNECOLOGY

## 2024-10-19 PROCEDURE — 25010000002 ONDANSETRON PER 1 MG: Performed by: ANESTHESIOLOGY

## 2024-10-19 PROCEDURE — 25010000002 PIPERACILLIN SOD-TAZOBACTAM PER 1 G: Performed by: OBSTETRICS & GYNECOLOGY

## 2024-10-19 PROCEDURE — 25010000002 NA FERRIC GLUC CPLX PER 12.5 MG: Performed by: OBSTETRICS & GYNECOLOGY

## 2024-10-19 PROCEDURE — 25010000002 LIDOCAINE PF 2% 2 % SOLUTION: Performed by: ANESTHESIOLOGY

## 2024-10-19 PROCEDURE — 25010000002 DEXAMETHASONE PER 1 MG: Performed by: ANESTHESIOLOGY

## 2024-10-19 PROCEDURE — 25010000002 HYDROMORPHONE 1 MG/ML SOLUTION: Performed by: OBSTETRICS & GYNECOLOGY

## 2024-10-19 PROCEDURE — 85610 PROTHROMBIN TIME: CPT | Performed by: OBSTETRICS & GYNECOLOGY

## 2024-10-19 PROCEDURE — 25010000002 GLYCOPYRROLATE 0.2 MG/ML SOLUTION: Performed by: ANESTHESIOLOGY

## 2024-10-19 PROCEDURE — 25010000002 FENTANYL CITRATE (PF) 50 MCG/ML SOLUTION: Performed by: ANESTHESIOLOGY

## 2024-10-19 PROCEDURE — 83540 ASSAY OF IRON: CPT | Performed by: OBSTETRICS & GYNECOLOGY

## 2024-10-19 PROCEDURE — 96376 TX/PRO/DX INJ SAME DRUG ADON: CPT

## 2024-10-19 PROCEDURE — 85018 HEMOGLOBIN: CPT | Performed by: OBSTETRICS & GYNECOLOGY

## 2024-10-19 PROCEDURE — 86850 RBC ANTIBODY SCREEN: CPT | Performed by: ANESTHESIOLOGY

## 2024-10-19 PROCEDURE — 85025 COMPLETE CBC W/AUTO DIFF WBC: CPT | Performed by: OBSTETRICS & GYNECOLOGY

## 2024-10-19 PROCEDURE — 85730 THROMBOPLASTIN TIME PARTIAL: CPT | Performed by: OBSTETRICS & GYNECOLOGY

## 2024-10-19 PROCEDURE — 84466 ASSAY OF TRANSFERRIN: CPT | Performed by: OBSTETRICS & GYNECOLOGY

## 2024-10-19 PROCEDURE — 25810000003 LACTATED RINGERS PER 1000 ML: Performed by: ANESTHESIOLOGY

## 2024-10-19 PROCEDURE — 86900 BLOOD TYPING SEROLOGIC ABO: CPT | Performed by: ANESTHESIOLOGY

## 2024-10-19 PROCEDURE — 86901 BLOOD TYPING SEROLOGIC RH(D): CPT | Performed by: ANESTHESIOLOGY

## 2024-10-19 PROCEDURE — 25010000002 NEOSTIGMINE 10 MG/10ML SOLUTION: Performed by: ANESTHESIOLOGY

## 2024-10-19 PROCEDURE — 25010000002 BUPIVACAINE (PF) 0.25 % SOLUTION: Performed by: OBSTETRICS & GYNECOLOGY

## 2024-10-19 PROCEDURE — 25010000002 MIDAZOLAM PER 1MG: Performed by: ANESTHESIOLOGY

## 2024-10-19 PROCEDURE — 25010000002 KETOROLAC TROMETHAMINE PER 15 MG: Performed by: ANESTHESIOLOGY

## 2024-10-19 PROCEDURE — 88305 TISSUE EXAM BY PATHOLOGIST: CPT | Performed by: OBSTETRICS & GYNECOLOGY

## 2024-10-19 DEVICE — ABSORBABLE HEMOSTAT (OXIDIZED REGENERATED CELLULOSE, U.S.P.)
Type: IMPLANTABLE DEVICE | Site: ABDOMEN | Status: FUNCTIONAL
Brand: SURGICEL

## 2024-10-19 RX ORDER — DOCUSATE SODIUM 100 MG/1
100 CAPSULE, LIQUID FILLED ORAL DAILY PRN
Status: DISCONTINUED | OUTPATIENT
Start: 2024-10-19 | End: 2024-10-21 | Stop reason: HOSPADM

## 2024-10-19 RX ORDER — ONDANSETRON 2 MG/ML
4 INJECTION INTRAMUSCULAR; INTRAVENOUS ONCE AS NEEDED
Status: CANCELLED | OUTPATIENT
Start: 2024-10-19

## 2024-10-19 RX ORDER — GLYCOPYRROLATE 0.2 MG/ML
INJECTION INTRAMUSCULAR; INTRAVENOUS AS NEEDED
Status: DISCONTINUED | OUTPATIENT
Start: 2024-10-19 | End: 2024-10-19 | Stop reason: SURG

## 2024-10-19 RX ORDER — KETOROLAC TROMETHAMINE 30 MG/ML
INJECTION, SOLUTION INTRAMUSCULAR; INTRAVENOUS AS NEEDED
Status: DISCONTINUED | OUTPATIENT
Start: 2024-10-19 | End: 2024-10-19 | Stop reason: SURG

## 2024-10-19 RX ORDER — PROMETHAZINE HYDROCHLORIDE 12.5 MG/1
25 TABLET ORAL ONCE AS NEEDED
Status: DISCONTINUED | OUTPATIENT
Start: 2024-10-19 | End: 2024-10-19 | Stop reason: HOSPADM

## 2024-10-19 RX ORDER — BUPIVACAINE HYDROCHLORIDE 2.5 MG/ML
INJECTION, SOLUTION EPIDURAL; INFILTRATION; INTRACAUDAL AS NEEDED
Status: DISCONTINUED | OUTPATIENT
Start: 2024-10-19 | End: 2024-10-19 | Stop reason: HOSPADM

## 2024-10-19 RX ORDER — ROCURONIUM BROMIDE 10 MG/ML
INJECTION, SOLUTION INTRAVENOUS AS NEEDED
Status: DISCONTINUED | OUTPATIENT
Start: 2024-10-19 | End: 2024-10-19 | Stop reason: SURG

## 2024-10-19 RX ORDER — ACETAMINOPHEN 500 MG
1000 TABLET ORAL ONCE
Status: DISCONTINUED | OUTPATIENT
Start: 2024-10-19 | End: 2024-10-21 | Stop reason: HOSPADM

## 2024-10-19 RX ORDER — SODIUM CHLORIDE 0.9 % (FLUSH) 0.9 %
10 SYRINGE (ML) INJECTION AS NEEDED
Status: CANCELLED | OUTPATIENT
Start: 2024-10-19

## 2024-10-19 RX ORDER — SODIUM CHLORIDE 9 MG/ML
40 INJECTION, SOLUTION INTRAVENOUS ONCE
Status: DISCONTINUED | OUTPATIENT
Start: 2024-10-19 | End: 2024-10-19 | Stop reason: HOSPADM

## 2024-10-19 RX ORDER — LIDOCAINE HYDROCHLORIDE 20 MG/ML
INJECTION, SOLUTION EPIDURAL; INFILTRATION; INTRACAUDAL; PERINEURAL AS NEEDED
Status: DISCONTINUED | OUTPATIENT
Start: 2024-10-19 | End: 2024-10-19 | Stop reason: SURG

## 2024-10-19 RX ORDER — IBUPROFEN 600 MG/1
600 TABLET, FILM COATED ORAL EVERY 6 HOURS PRN
Status: DISCONTINUED | OUTPATIENT
Start: 2024-10-19 | End: 2024-10-21 | Stop reason: HOSPADM

## 2024-10-19 RX ORDER — DEXAMETHASONE SODIUM PHOSPHATE 4 MG/ML
INJECTION, SOLUTION INTRA-ARTICULAR; INTRALESIONAL; INTRAMUSCULAR; INTRAVENOUS; SOFT TISSUE AS NEEDED
Status: DISCONTINUED | OUTPATIENT
Start: 2024-10-19 | End: 2024-10-19 | Stop reason: SURG

## 2024-10-19 RX ORDER — ONDANSETRON 2 MG/ML
4 INJECTION INTRAMUSCULAR; INTRAVENOUS ONCE AS NEEDED
Status: DISCONTINUED | OUTPATIENT
Start: 2024-10-19 | End: 2024-10-19 | Stop reason: HOSPADM

## 2024-10-19 RX ORDER — KETOROLAC TROMETHAMINE 15 MG/ML
15 INJECTION, SOLUTION INTRAMUSCULAR; INTRAVENOUS EVERY 6 HOURS
Status: COMPLETED | OUTPATIENT
Start: 2024-10-19 | End: 2024-10-19

## 2024-10-19 RX ORDER — MIDAZOLAM HYDROCHLORIDE 2 MG/2ML
2 INJECTION, SOLUTION INTRAMUSCULAR; INTRAVENOUS ONCE
Status: COMPLETED | OUTPATIENT
Start: 2024-10-19 | End: 2024-10-19

## 2024-10-19 RX ORDER — FENTANYL CITRATE 50 UG/ML
INJECTION, SOLUTION INTRAMUSCULAR; INTRAVENOUS AS NEEDED
Status: DISCONTINUED | OUTPATIENT
Start: 2024-10-19 | End: 2024-10-19 | Stop reason: SURG

## 2024-10-19 RX ORDER — PROMETHAZINE HYDROCHLORIDE 25 MG/1
25 SUPPOSITORY RECTAL ONCE AS NEEDED
Status: DISCONTINUED | OUTPATIENT
Start: 2024-10-19 | End: 2024-10-19 | Stop reason: HOSPADM

## 2024-10-19 RX ORDER — SCOLOPAMINE TRANSDERMAL SYSTEM 1 MG/1
1 PATCH, EXTENDED RELEASE TRANSDERMAL
Status: DISCONTINUED | OUTPATIENT
Start: 2024-10-19 | End: 2024-10-21 | Stop reason: HOSPADM

## 2024-10-19 RX ORDER — SODIUM CHLORIDE 0.9 % (FLUSH) 0.9 %
10 SYRINGE (ML) INJECTION AS NEEDED
Status: DISCONTINUED | OUTPATIENT
Start: 2024-10-19 | End: 2024-10-19 | Stop reason: HOSPADM

## 2024-10-19 RX ORDER — NICOTINE 21 MG/24HR
1 PATCH, TRANSDERMAL 24 HOURS TRANSDERMAL
Status: DISCONTINUED | OUTPATIENT
Start: 2024-10-19 | End: 2024-10-21 | Stop reason: HOSPADM

## 2024-10-19 RX ORDER — PROPOFOL 10 MG/ML
VIAL (ML) INTRAVENOUS AS NEEDED
Status: DISCONTINUED | OUTPATIENT
Start: 2024-10-19 | End: 2024-10-19 | Stop reason: SURG

## 2024-10-19 RX ORDER — FERROUS SULFATE 325(65) MG
325 TABLET ORAL 3 TIMES WEEKLY
Status: DISCONTINUED | OUTPATIENT
Start: 2024-10-21 | End: 2024-10-21 | Stop reason: HOSPADM

## 2024-10-19 RX ORDER — ONDANSETRON 2 MG/ML
INJECTION INTRAMUSCULAR; INTRAVENOUS AS NEEDED
Status: DISCONTINUED | OUTPATIENT
Start: 2024-10-19 | End: 2024-10-19 | Stop reason: SURG

## 2024-10-19 RX ORDER — OXYCODONE HYDROCHLORIDE 5 MG/1
5 TABLET ORAL
Status: CANCELLED | OUTPATIENT
Start: 2024-10-19

## 2024-10-19 RX ORDER — SODIUM CHLORIDE 0.9 % (FLUSH) 0.9 %
10 SYRINGE (ML) INJECTION EVERY 12 HOURS SCHEDULED
Status: CANCELLED | OUTPATIENT
Start: 2024-10-19

## 2024-10-19 RX ORDER — SODIUM CHLORIDE 0.9 % (FLUSH) 0.9 %
10 SYRINGE (ML) INJECTION EVERY 12 HOURS SCHEDULED
Status: DISCONTINUED | OUTPATIENT
Start: 2024-10-19 | End: 2024-10-19 | Stop reason: HOSPADM

## 2024-10-19 RX ORDER — PHENYLEPHRINE HCL IN 0.9% NACL 1 MG/10 ML
SYRINGE (ML) INTRAVENOUS AS NEEDED
Status: DISCONTINUED | OUTPATIENT
Start: 2024-10-19 | End: 2024-10-19 | Stop reason: SURG

## 2024-10-19 RX ORDER — OXYCODONE HYDROCHLORIDE 5 MG/1
5 TABLET ORAL
Status: DISCONTINUED | OUTPATIENT
Start: 2024-10-19 | End: 2024-10-19 | Stop reason: HOSPADM

## 2024-10-19 RX ORDER — PROMETHAZINE HYDROCHLORIDE 25 MG/1
25 SUPPOSITORY RECTAL ONCE AS NEEDED
Status: CANCELLED | OUTPATIENT
Start: 2024-10-19

## 2024-10-19 RX ORDER — PHENAZOPYRIDINE HYDROCHLORIDE 200 MG/1
200 TABLET, FILM COATED ORAL ONCE
Status: CANCELLED | OUTPATIENT
Start: 2024-10-19 | End: 2024-10-19

## 2024-10-19 RX ORDER — NEOSTIGMINE METHYLSULFATE 1 MG/ML
INJECTION INTRAVENOUS AS NEEDED
Status: DISCONTINUED | OUTPATIENT
Start: 2024-10-19 | End: 2024-10-19 | Stop reason: SURG

## 2024-10-19 RX ORDER — MEPERIDINE HYDROCHLORIDE 25 MG/ML
12.5 INJECTION INTRAMUSCULAR; INTRAVENOUS; SUBCUTANEOUS
Status: CANCELLED | OUTPATIENT
Start: 2024-10-19 | End: 2024-10-20

## 2024-10-19 RX ORDER — SODIUM CHLORIDE 9 MG/ML
40 INJECTION, SOLUTION INTRAVENOUS ONCE
Status: CANCELLED | OUTPATIENT
Start: 2024-10-19

## 2024-10-19 RX ORDER — ONDANSETRON 2 MG/ML
4 INJECTION INTRAMUSCULAR; INTRAVENOUS EVERY 6 HOURS PRN
Status: DISCONTINUED | OUTPATIENT
Start: 2024-10-19 | End: 2024-10-21 | Stop reason: HOSPADM

## 2024-10-19 RX ORDER — MEPERIDINE HYDROCHLORIDE 25 MG/ML
12.5 INJECTION INTRAMUSCULAR; INTRAVENOUS; SUBCUTANEOUS
Status: DISCONTINUED | OUTPATIENT
Start: 2024-10-19 | End: 2024-10-19 | Stop reason: HOSPADM

## 2024-10-19 RX ORDER — ONDANSETRON 4 MG/1
4 TABLET, ORALLY DISINTEGRATING ORAL EVERY 6 HOURS PRN
Status: DISCONTINUED | OUTPATIENT
Start: 2024-10-19 | End: 2024-10-21 | Stop reason: HOSPADM

## 2024-10-19 RX ORDER — SODIUM CHLORIDE, SODIUM LACTATE, POTASSIUM CHLORIDE, CALCIUM CHLORIDE 600; 310; 30; 20 MG/100ML; MG/100ML; MG/100ML; MG/100ML
20 INJECTION, SOLUTION INTRAVENOUS CONTINUOUS PRN
Status: ACTIVE | OUTPATIENT
Start: 2024-10-19 | End: 2024-10-20

## 2024-10-19 RX ORDER — PHENAZOPYRIDINE HYDROCHLORIDE 200 MG/1
200 TABLET, FILM COATED ORAL ONCE
Status: COMPLETED | OUTPATIENT
Start: 2024-10-19 | End: 2024-10-19

## 2024-10-19 RX ORDER — PROMETHAZINE HYDROCHLORIDE 25 MG/1
25 TABLET ORAL ONCE AS NEEDED
Status: CANCELLED | OUTPATIENT
Start: 2024-10-19

## 2024-10-19 RX ADMIN — Medication 300 MCG: at 10:37

## 2024-10-19 RX ADMIN — HYDROMORPHONE HYDROCHLORIDE 0.5 MG: 1 INJECTION, SOLUTION INTRAMUSCULAR; INTRAVENOUS; SUBCUTANEOUS at 01:47

## 2024-10-19 RX ADMIN — SODIUM CHLORIDE 125 MG: 9 INJECTION, SOLUTION INTRAVENOUS at 21:36

## 2024-10-19 RX ADMIN — SODIUM CHLORIDE, POTASSIUM CHLORIDE, SODIUM LACTATE AND CALCIUM CHLORIDE: 600; 310; 30; 20 INJECTION, SOLUTION INTRAVENOUS at 11:28

## 2024-10-19 RX ADMIN — NEOSTIGMINE METHYLSULFATE 3 MG: 1 INJECTION INTRAVENOUS at 12:02

## 2024-10-19 RX ADMIN — ROCURONIUM BROMIDE 50 MG: 10 INJECTION, SOLUTION INTRAVENOUS at 10:04

## 2024-10-19 RX ADMIN — SODIUM CHLORIDE, POTASSIUM CHLORIDE, SODIUM LACTATE AND CALCIUM CHLORIDE: 600; 310; 30; 20 INJECTION, SOLUTION INTRAVENOUS at 10:34

## 2024-10-19 RX ADMIN — LIDOCAINE HYDROCHLORIDE 60 MG: 20 INJECTION, SOLUTION INTRAVENOUS at 10:03

## 2024-10-19 RX ADMIN — SODIUM CHLORIDE, POTASSIUM CHLORIDE, SODIUM LACTATE AND CALCIUM CHLORIDE: 600; 310; 30; 20 INJECTION, SOLUTION INTRAVENOUS at 10:02

## 2024-10-19 RX ADMIN — NICOTINE 1 PATCH: 21 PATCH, EXTENDED RELEASE TRANSDERMAL at 18:39

## 2024-10-19 RX ADMIN — DEXAMETHASONE SODIUM PHOSPHATE 4 MG: 4 INJECTION, SOLUTION INTRAMUSCULAR; INTRAVENOUS at 10:14

## 2024-10-19 RX ADMIN — PHENAZOPYRIDINE 200 MG: 200 TABLET ORAL at 08:38

## 2024-10-19 RX ADMIN — HYDROMORPHONE HYDROCHLORIDE 0.5 MG: 1 INJECTION, SOLUTION INTRAMUSCULAR; INTRAVENOUS; SUBCUTANEOUS at 08:38

## 2024-10-19 RX ADMIN — HYDROMORPHONE HYDROCHLORIDE 0.5 MG: 1 INJECTION, SOLUTION INTRAMUSCULAR; INTRAVENOUS; SUBCUTANEOUS at 11:37

## 2024-10-19 RX ADMIN — FENTANYL CITRATE 50 MCG: 50 INJECTION, SOLUTION INTRAMUSCULAR; INTRAVENOUS at 10:07

## 2024-10-19 RX ADMIN — KETOROLAC TROMETHAMINE 30 MG: 30 INJECTION, SOLUTION INTRAMUSCULAR; INTRAVENOUS at 12:01

## 2024-10-19 RX ADMIN — FENTANYL CITRATE 100 MCG: 50 INJECTION, SOLUTION INTRAMUSCULAR; INTRAVENOUS at 11:58

## 2024-10-19 RX ADMIN — PIPERACILLIN AND TAZOBACTAM 3.38 G: 3; .375 INJECTION, POWDER, FOR SOLUTION INTRAVENOUS at 15:23

## 2024-10-19 RX ADMIN — FENTANYL CITRATE 100 MCG: 50 INJECTION, SOLUTION INTRAMUSCULAR; INTRAVENOUS at 11:24

## 2024-10-19 RX ADMIN — FENTANYL CITRATE 100 MCG: 50 INJECTION, SOLUTION INTRAMUSCULAR; INTRAVENOUS at 10:37

## 2024-10-19 RX ADMIN — MIDAZOLAM HYDROCHLORIDE 2 MG: 1 INJECTION, SOLUTION INTRAMUSCULAR; INTRAVENOUS at 09:57

## 2024-10-19 RX ADMIN — FENTANYL CITRATE 50 MCG: 50 INJECTION, SOLUTION INTRAMUSCULAR; INTRAVENOUS at 10:04

## 2024-10-19 RX ADMIN — HYDROMORPHONE HYDROCHLORIDE 0.5 MG: 1 INJECTION, SOLUTION INTRAMUSCULAR; INTRAVENOUS; SUBCUTANEOUS at 11:12

## 2024-10-19 RX ADMIN — PIPERACILLIN AND TAZOBACTAM 3.38 G: 3; .375 INJECTION, POWDER, FOR SOLUTION INTRAVENOUS at 23:20

## 2024-10-19 RX ADMIN — Medication 100 MCG: at 10:22

## 2024-10-19 RX ADMIN — HYDROMORPHONE HYDROCHLORIDE 0.5 MG: 1 INJECTION, SOLUTION INTRAMUSCULAR; INTRAVENOUS; SUBCUTANEOUS at 21:57

## 2024-10-19 RX ADMIN — PIPERACILLIN AND TAZOBACTAM 3.38 G: 3; .375 INJECTION, POWDER, FOR SOLUTION INTRAVENOUS at 04:59

## 2024-10-19 RX ADMIN — ONDANSETRON HYDROCHLORIDE 4 MG: 2 SOLUTION INTRAMUSCULAR; INTRAVENOUS at 12:01

## 2024-10-19 RX ADMIN — Medication 10 ML: at 19:49

## 2024-10-19 RX ADMIN — HYDROMORPHONE HYDROCHLORIDE 0.5 MG: 1 INJECTION, SOLUTION INTRAMUSCULAR; INTRAVENOUS; SUBCUTANEOUS at 18:39

## 2024-10-19 RX ADMIN — PROPOFOL 200 MG: 10 INJECTION, EMULSION INTRAVENOUS at 10:03

## 2024-10-19 RX ADMIN — KETOROLAC TROMETHAMINE 15 MG: 15 INJECTION, SOLUTION INTRAMUSCULAR; INTRAVENOUS at 19:48

## 2024-10-19 RX ADMIN — Medication 100 MCG: at 10:18

## 2024-10-19 RX ADMIN — Medication 10 ML: at 01:47

## 2024-10-19 RX ADMIN — Medication 200 MCG: at 10:33

## 2024-10-19 RX ADMIN — FENTANYL CITRATE 100 MCG: 50 INJECTION, SOLUTION INTRAMUSCULAR; INTRAVENOUS at 10:53

## 2024-10-19 RX ADMIN — KETOROLAC TROMETHAMINE 15 MG: 15 INJECTION, SOLUTION INTRAMUSCULAR; INTRAVENOUS at 15:24

## 2024-10-19 RX ADMIN — SCOPALAMINE 1 PATCH: 1 PATCH, EXTENDED RELEASE TRANSDERMAL at 09:59

## 2024-10-19 RX ADMIN — GLYCOPYRROLATE 0.4 MG: 0.2 INJECTION INTRAMUSCULAR; INTRAVENOUS at 12:03

## 2024-10-19 RX ADMIN — HYDROMORPHONE HYDROCHLORIDE 0.5 MG: 1 INJECTION, SOLUTION INTRAMUSCULAR; INTRAVENOUS; SUBCUTANEOUS at 05:44

## 2024-10-19 NOTE — PLAN OF CARE
Goal Outcome Evaluation:  Plan of Care Reviewed With: patient        Progress: no change  Outcome Evaluation: alert and oriented x4, VSS, pt had procedure today, 4 scope sites noted, old drainage noted to sites, abd pad and abd binder in use, IV anbx infusing to right forearm, gave pain medication one time prn, mother at bedside, will continue with plan of care

## 2024-10-19 NOTE — ANESTHESIA PREPROCEDURE EVALUATION
Anesthesia Evaluation     Patient summary reviewed and Nursing notes reviewed   no history of anesthetic complications:   NPO Solid Status: > 8 hours  NPO Liquid Status: > 2 hours           Airway   Mallampati: II  TM distance: >3 FB  Neck ROM: full  No difficulty expected  Dental      Pulmonary - negative pulmonary ROS and normal exam    breath sounds clear to auscultation  Cardiovascular - negative cardio ROS and normal exam  Exercise tolerance: good (4-7 METS)    ECG reviewed  Rhythm: regular  Rate: normal        Neuro/Psych  (+) headaches, psychiatric history Anxiety  GI/Hepatic/Renal/Endo    (+) renal disease- stones    Musculoskeletal (-) negative ROS    Abdominal    Substance History - negative use     OB/GYN negative ob/gyn ROS         Other - negative ROS       ROS/Med Hx Other: PAT Nursing Notes unavailable.           Latest Reference Range & Units 10/18/24 10:35   Sodium 136 - 145 mmol/L 139   Potassium 3.5 - 5.2 mmol/L 3.7   Chloride 98 - 107 mmol/L 105   CO2 22.0 - 29.0 mmol/L 21.1 (L)   Anion Gap 5.0 - 15.0 mmol/L 12.9   BUN 6 - 20 mg/dL 14   Creatinine 0.57 - 1.00 mg/dL 0.69   BUN/Creatinine Ratio 7.0 - 25.0  20.3   eGFR >60.0 mL/min/1.73 123.7   Glucose 65 - 99 mg/dL 142 (H)   Calcium 8.6 - 10.5 mg/dL 8.9   Alkaline Phosphatase 39 - 117 U/L 78   Total Protein 6.0 - 8.5 g/dL 6.6   Albumin 3.5 - 5.2 g/dL 4.1   Globulin gm/dL 2.5   A/G Ratio g/dL 1.6   AST (SGOT) 1 - 32 U/L 12   ALT (SGPT) 1 - 33 U/L 14   Total Bilirubin 0.0 - 1.2 mg/dL 1.2   (L): Data is abnormally low  (H): Data is abnormally high    IMPRESSION:  There has been 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 There is an approximately 10 cm pelvic hematoma, with abdominal and pelvic hemoperitoneum. The overall appearance is noted to be very similar to the comparison (preoperative) study, but is   presumed to be recurrent as there would likely have been liquefaction of the pelvic hematoma and resorption of some of the hemoperitoneum  over the 3 weeks that have passed since the procedure     Latest Reference Range & Units 10/19/24 01:40   Protime 11.8 - 14.9 Seconds 15.1 (H)   INR 0.86 - 1.15  1.17 (H)   (H): Data is abnormally high     Latest Reference Range & Units 10/19/24 01:40   Hemoglobin 12.0 - 15.9 g/dL 9.3 (L)   Hematocrit 34.0 - 46.6 % 28.0 (L)   (L): Data is abnormally low        Latest Reference Range & Units 10/18/24 10:35   HCG Quantitative mIU/mL <0.50       Anesthesia Plan    ASA 2     general     (Patient understands anesthesia not responsible for dental damage.    Type and screen ordered, previously had surgery on 9/27 now presenting with 10 cm pelvic hematoma, with abdominal and pelvic hemoperitoneum    To or for diag lap, oophorectomy)  intravenous induction     Anesthetic plan, risks, benefits, and alternatives have been provided, discussed and informed consent has been obtained with: patient.    Use of blood products discussed with patient .    Plan discussed with CRNA.      CODE STATUS:    Level Of Support Discussed With: Patient  Code Status (Patient has no pulse and is not breathing): CPR (Attempt to Resuscitate)  Medical Interventions (Patient has pulse or is breathing): Full Support

## 2024-10-19 NOTE — OP NOTE
Subjective     Date of Service:  10/19/24  Time of Service:  12:45 EDT    Surgical Staff: Surgeons and Role:     * Rachelle Marroquin MD - Primary   Additional Staff: OR techs   Pre-operative diagnosis(es): Pre-Op Diagnosis Codes:      * Pelvic hematoma in female [N94.89]     Post-operative diagnosis(es): Post-Op Diagnosis Codes:     * Pelvic hematoma in female [N94.89]     * Hemorrhagic ovarian cyst [N83.209]   Procedure(s): Procedure(s):  RIGHT OOPHORECTOMY LAPAROSCOPIC AND EVACTION OF ENDOPERITONEUM     Antibiotics: Zosyn      Anesthesia: Type: General  ASA:  II     Objective      Operative findings: Approximately 300 cc hemoperitoneum.  Bleeding from left cystic ovary.  Grossly normal-appearing uterus and right fallopian tube.  Ovarian remnants on the left with hydrosalpinx abdominal pelvic adhesions.  Grossly normal liver Micheal and gallbladder and appendix; endometrial implants; IUD in place   Specimens removed: ID Type Source Tests Collected by Time   A : RIGHT OVARY AND CLOT Tissue Ovary, Right TISSUE PATHOLOGY EXAM Rachelle Marroquin MD 10/19/2024 1121      Fluid Intake: 1500 mL   Output: Documented Output  Est. Blood Loss 50 mL surgical; 300 mL hemoperitoneum  Urine Output 125 mL      I/O this shift:  In: 1500 [I.V.:1500]  Out: 475 [Urine:125; Blood:350]     Blood products used: No   Drains: [REMOVED] Urethral Catheter (Removed)      Implant Information: Implant Name Type Inv. Item Serial No.  Lot No. LRB No. Used Action   HEMOST ABS SURGICEL 2X14 - [id number] Implant HEMOST ABS SURGICEL 2X14  [employer] [id number] Right 1 Implanted      Complications: None   Description of procedure: Appropriate consents were obtained.  Patient was taken to the OR where she was intubated and given general anesthesia she was placed in dorsolithotomy position utilizing universal Ole stirrups.  Timeout procedures were carried out.  Pelvic exam under anesthesia was performed.  [place] speculum was  placed and this was followed by the placement of the [employer] uterine manipulator.  IUD was noted to be in place.  Indwelling catheter was placed.  Urine appeared to be concentrated.  The patient was repositioned.  Gloves were changed and attention turned to the abdominal portion of the procedure.  1% lidocaine plain was utilized prior to making laparoscopic incisions.  Stab incision was made at the infraumbilical edge.  The Veress needle was introduced.  Position was assessed by the hanging drop technique.  Pneumoperitoneum was established.  Once deemed adequate the 5 mm trocar was introduced under direct visualization.  Additional trocars were placed over the previous laparoscopic sites the 1011 trocar was on the patient's left the 5 mm trocars were left inferior and on the right contralateral to the 1011 port.  These were all placed under direct visualization.  Hemoperitoneum was present.  Patient was placed in Trendelenburg position.  Some initial irrigation was carried out.  The bleeding was noted from the right ovary.  It is complex in appearance and cystic.  The LigaSure device was utilized to excise the ovary and cyst.  The patient was repositioned several times to help assist with the evacuation of the hemoperitoneum.  The pelvis was copiously irrigated.  Clot and surgical specimen were removed through the 1011 port site in the Endobag.  The 1011 mm trocar was removed and reintroduced under direct visualization in order to allow for removal of the specimen.  The pneumoperitoneum was released down to approximately 6.  The surgical site remained hemostatic.  Surgicel was placed.  The pelvis was inspected.  Endometrial implants and adhesions as previously noted were present.  The Endo fascial closure device was utilized to bring together the fascia at the 1011 mm port site.  The pneumoperitoneum was released and trocars removed.  The patient was placed in reverse rachelle and Valsalva maneuver was implemented.  The  incisions were closed with 4-0 Monocryl in interrupted and running techniques.  The remainder of the local was administered.  Attention was turned to the vaginal portion of procedure.  The indwelling Peterson catheter was removed.  The urine still appeared to be concentrated.  Evidence of the Pyridium was present.  The [place] uterine manipulator was removed.  The cervix was inspected.  Sponge stick was utilized to make sure that the site was hemostatic and it was.  The patient was left in stable condition awaiting transfer for to the PACU.   Condition: stable.  Once patient arrived PACU she was noted to have swelling that was confined on the left near the to left laparoscopic sites.  The dressings were intact.  Abdominal binder and abdominal pressure dressings were placed.     Disposition: to PACU and then admit to   3 W. room 382         Assessment & Plan     Status post diagnostic laparoscopy and right oophorectomy for hemorrhagic right ovarian cyst and evacuation of hematoma.  Routine postop care.  Close attention to surgical sites.  Serial H&H.  Have received counseling about the possibility for blood transfusions.  Advance diet and activity as tolerated              Rachelle Marroquin MD  10/19/24  12:44 EDT

## 2024-10-19 NOTE — ANESTHESIA POSTPROCEDURE EVALUATION
Patient: Janey Wakefield    Procedure Summary       Date: 10/19/24 Room / Location: [facility] OR  / [facility] MAIN OR    Anesthesia Start: 1002 Anesthesia Stop: 1232    Procedure: RIGHT OOPHORECTOMY LAPAROSCOPIC AND EVACTION OF ENDOPERITONEUM (Right: Abdomen) Diagnosis:       Pelvic hematoma in female      Hemorrhagic ovarian cyst      (Pelvic hematoma in female [N94.89])    Surgeons: Rachelle Marroquin MD Provider: Corey Ziegler MD    Anesthesia Type: general ASA Status: 2            Anesthesia Type: general    Vitals  Vitals Value Taken Time   /64 10/19/24 1400   Temp 36.5 °C (97.7 °F) 10/19/24 1320   Pulse 96 10/19/24 1402   Resp 16 10/19/24 1350   SpO2 97 % 10/19/24 1402   Vitals shown include unfiled device data.        Post Anesthesia Care and Evaluation    Patient location during evaluation: bedside  Patient participation: complete - patient participated  Level of consciousness: awake  Pain score: 2  Pain management: adequate    Airway patency: patent  Anesthetic complications: No anesthetic complications  PONV Status: none  Cardiovascular status: acceptable and stable  Respiratory status: acceptable  Hydration status: acceptable

## 2024-10-19 NOTE — PROGRESS NOTES
"  Subjective     Subjective  Patient reports:  Pain is resting comfortably    Objective:  Vital signs (most recent): Blood pressure 108/54, pulse 75, temperature 98.6 °F (37 °C), temperature source Oral, resp. rate 18, height 157.5 cm (62\"), weight 61.9 kg (136 lb 6.4 oz), last menstrual period 09/27/2024, SpO2 94%, not currently breastfeeding.    Physical Exam:      Lungs: normal and clear to auscultation   Abdomen: Hypoactive bowel sounds, soft, tenderness with moderate palpation   Extremities:  extremities normal, atraumatic, no cyanosis or edema   Neurologic Intact       Vital Sign Min/Max    Temp  Min: 97.5 °F (36.4 °C)  Max: 98.8 °F (37.1 °C)   Pulse  Min: 75  Max: 90   Resp  Min: 18  Max: 18   BP  Min: 100/51  Max: 119/59   Weight  Min: 61.9 kg (136 lb 6.4 oz)  Max: 71.4 kg (157 lb 6.5 oz)   SpO2  Min: 94 %  Max: 100 %   No data recorded     Flowsheet Rows      Flowsheet Row First Filed Value   Admission Height 157.5 cm (62\") Documented at 10/18/2024 1008   Admission Weight 71.4 kg (157 lb 6.5 oz) Documented at 10/18/2024 1008            Intake/Output last 24 hours:    Intake/Output Summary (Last 24 hours) at 10/19/2024 0739  Last data filed at 10/19/2024 0054  Gross per 24 hour   Intake 100 ml   Output --   Net 100 ml       Intake/Output this shift:  No intake/output data recorded.    Labs/Studies reviewed: Hemoglobin and hematocrit has trended down slowly throughout the period of observation  Radiology studies reviewed:   Yes   Medications reviewed:   Yes     Assessment & Plan   Diagnoses and all orders for this visit:    1. Pelvic hematoma in female (Primary)    Other orders  -     Cancel: NPO Diet NPO Type: Strict NPO; Standing  -     Undress & Gown; Standing  -     Insert Peripheral IV; Standing  -     sodium chloride 0.9 % flush 10 mL  -     [place] Draw; Standing  -     CBC & Differential; Standing  -     Comprehensive Metabolic Panel; Standing  -     Lipase; Standing  -     Urinalysis With Microscopic " If Indicated (No Culture) - Urine, Clean Catch; Standing  -     hCG, Quantitative, Pregnancy; Standing  -     Cancel: NPO Diet NPO Type: Strict NPO  -     Undress & Gown  -     Insert Peripheral IV  -     [place] Draw  -     CBC & Differential  -     Comprehensive Metabolic Panel  -     Lipase  -     Urinalysis With Microscopic If Indicated (No Culture) - Urine, Clean Catch  -     hCG, Quantitative, Pregnancy  -     HYDROmorphone (DILAUDID) injection 1 mg  -     ondansetron (ZOFRAN) injection 4 mg  -     CT Abdomen Pelvis With Contrast; Standing  -     CT Abdomen Pelvis With Contrast  -     iopamidol (ISOVUE-370) 76 % injection 100 mL  -     OB / GYN (on-call MD unless specified); Standing  -     OB / GYN (on-call MD unless specified)  -     piperacillin-tazobactam (ZOSYN) IVPB 3.375 g IVPB in 100 mL NS (VTB)  -     Lactic Acid, Plasma; Standing  -     Blood Culture - Blood,; Standing  -     Blood Culture - Blood,; Standing  -     Lactic Acid, Plasma  -     Blood Culture - Blood, Arm, Left  -     Blood Culture - Blood, Arm, Left  -     HYDROmorphone (DILAUDID) injection 1 mg  -     Initiate Observation Status; Standing  -     Vital Signs; Standing  -     Intake & Output; Standing  -     Notify Provider; Standing  -     VTE Prophylaxis Not Indicated: Low Risk; No Risk Factors (0); Standing  -     Activity - Ad Lachelle; Standing  -     NPO Diet NPO Type: Strict NPO; Standing  -     CBC (No Diff); Standing  -     Discontinue: piperacillin-tazobactam (ZOSYN) IVPB 3.375 g IVPB in 100 mL NS (VTB)  -     piperacillin-tazobactam (ZOSYN) IVPB 3.375 g IVPB in 100 mL NS (VTB)  -     HYDROmorphone (DILAUDID) injection 0.5 mg  -     ondansetron ODT (ZOFRAN-ODT) disintegrating tablet 4 mg  -     ondansetron (ZOFRAN) injection 4 mg  -     Initiate Observation Status  -     Vital Signs  -     Vital Signs  -     Vital Signs  -     Intake & Output  -     Notify Provider  -     VTE Prophylaxis Not Indicated: Low Risk; No Risk Factors  (0)  -     Activity - Ad Lachelle  -     NPO Diet NPO Type: Strict NPO  -     CBC (No Diff)  -     Hemoglobin & Hematocrit, Blood; Standing  -     Hemoglobin & Hematocrit, Blood  -     Initiate & Follow Hypercapnic Monitoring Guideline for Opioid Administration via EtCO2 and / or SpO2; Standing  -     Opioid Administration - Document EtCO2 and / or SpO2 With Each Set of Vitals & Any Change in Patient Status; Standing  -     Opioid Administration - Notify Provider Hypercapnic Monitoring; Standing  -     Opioid Administration - Continuous Pulse Oximetry (SpO2); Standing  -     Initiate & Follow Hypercapnic Monitoring Guideline for Opioid Administration via EtCO2 and / or SpO2  -     Opioid Administration - Document EtCO2 and / or SpO2 With Each Set of Vitals & Any Change in Patient Status  -     Opioid Administration - Notify Provider Hypercapnic Monitoring  -     Opioid Administration - Continuous Pulse Oximetry (SpO2)  -     CBC & Differential; Standing  -     Protime-INR; Standing  -     aPTT; Standing  -     Code Status and Medical Interventions: CPR (Attempt to Resuscitate); Full Support; Standing  -     Code Status and Medical Interventions: CPR (Attempt to Resuscitate); Full Support  -     CBC & Differential  -     Protime-INR  -     aPTT  -     Cancel: CBC (No Diff); Standing  -     Cancel: CBC (No Diff)  -     Vital Signs  -     Vital Signs  -     Vital Signs  -     Vital Signs  -     Vital Signs  -     Vital Signs  -     Intake & Output  -     Intake & Output  -     Extra Tubes; Standing  -     Extra Tubes  -     Type & Screen; Standing  -     Type & Screen  -     Vital Signs - Per Anesthesia Protocol; Standing  -     Remove Scopolamine Patch 24 Hours After Application; Standing  -     Continuous Pulse Oximetry; Standing  -     Insert Peripheral IV; Standing  -     lactated ringers infusion  -     acetaminophen (TYLENOL) tablet 1,000 mg  -     Midazolam HCl (PF) (VERSED) injection 2 mg  -     Do not give versed  until pt has been seen/evaluated by anesthesia staff prior to proceure  Nursing Communication; Standing  -     Insert Peripheral IV  -     Do not give versed until pt has been seen/evaluated by anesthesia staff prior to proceure  Nursing Communication    Assessment  Abdominal pain, 3 weeks status post resection of multiple right ovarian cyst and evacuation of hematoma    Pelvic hematoma in female; anemia with downward trending of H&H      Plan    Schedule for surgery - today diagnostic laparoscopy with possible right salpingo-oophorectomy, possible exploratory laparotomy.  Has been informed regarding the indications, benefits and risk including risk of infections, excessive blood loss, damage to surrounding organs, blood transfusions, DVT, removal of the ovary on the right resulting in menopause given the minimal left ovarian tissue that is present; infertility.  Discussion was carried out with the patient and her mother, including plain language.  They were given the opportunity to ask questions.  They verified their understanding as well as her willingness to proceed and accept the aforementioned risk.  Continue IV antibiotics and supportive measures.  Prepare for surgery           .                  Rachelle Marroquin MD  10/19/24  07:39 EDT

## 2024-10-19 NOTE — PROGRESS NOTES
Report given to Dr. Marroquin regarding pts H/H:    1035 - 11.3 / 33.9  1628 - 10.8 / 31.7  2058 - 9.9 / 30.2    Vitals stable    Dr. Marroquin would like labs in am

## 2024-10-19 NOTE — PLAN OF CARE
Goal Outcome Evaluation:  Plan of Care Reviewed With: patient        Progress: no change  Outcome Evaluation: Alert and oriented x4. VSS. Medicated for pain per MAR. No additional needs at this time. Plan of care ongoing.

## 2024-10-20 LAB
APTT PPP: 39.4 SECONDS (ref 24.2–34.2)
BASOPHILS # BLD AUTO: 0.03 10*3/MM3 (ref 0–0.2)
BASOPHILS NFR BLD AUTO: 0.2 % (ref 0–1.5)
DEPRECATED RDW RBC AUTO: 45.7 FL (ref 37–54)
EOSINOPHIL # BLD AUTO: 0.03 10*3/MM3 (ref 0–0.4)
EOSINOPHIL NFR BLD AUTO: 0.2 % (ref 0.3–6.2)
ERYTHROCYTE [DISTWIDTH] IN BLOOD BY AUTOMATED COUNT: 12.6 % (ref 12.3–15.4)
FIBRINOGEN PPP-MCNC: 349 MG/DL (ref 215–521)
HCT VFR BLD AUTO: 25 % (ref 34–46.6)
HGB BLD-MCNC: 8.4 G/DL (ref 12–15.9)
HOLD SPECIMEN: NORMAL
IMM GRANULOCYTES # BLD AUTO: 0.05 10*3/MM3 (ref 0–0.05)
IMM GRANULOCYTES NFR BLD AUTO: 0.4 % (ref 0–0.5)
INR PPP: 1.16 (ref 0.86–1.15)
LYMPHOCYTES # BLD AUTO: 1.55 10*3/MM3 (ref 0.7–3.1)
LYMPHOCYTES NFR BLD AUTO: 12.7 % (ref 19.6–45.3)
MCH RBC QN AUTO: 33.3 PG (ref 26.6–33)
MCHC RBC AUTO-ENTMCNC: 33.6 G/DL (ref 31.5–35.7)
MCV RBC AUTO: 99.2 FL (ref 79–97)
MONOCYTES # BLD AUTO: 1.08 10*3/MM3 (ref 0.1–0.9)
MONOCYTES NFR BLD AUTO: 8.8 % (ref 5–12)
NEUTROPHILS NFR BLD AUTO: 77.7 % (ref 42.7–76)
NEUTROPHILS NFR BLD AUTO: 9.47 10*3/MM3 (ref 1.7–7)
NRBC BLD AUTO-RTO: 0 /100 WBC (ref 0–0.2)
PLATELET # BLD AUTO: 263 10*3/MM3 (ref 140–450)
PMV BLD AUTO: 9.5 FL (ref 6–12)
PROTHROMBIN TIME: 15.1 SECONDS (ref 11.8–14.9)
RBC # BLD AUTO: 2.52 10*6/MM3 (ref 3.77–5.28)
WBC NRBC COR # BLD AUTO: 12.21 10*3/MM3 (ref 3.4–10.8)

## 2024-10-20 PROCEDURE — 25010000002 NA FERRIC GLUC CPLX PER 12.5 MG: Performed by: OBSTETRICS & GYNECOLOGY

## 2024-10-20 PROCEDURE — 85730 THROMBOPLASTIN TIME PARTIAL: CPT | Performed by: OBSTETRICS & GYNECOLOGY

## 2024-10-20 PROCEDURE — 85025 COMPLETE CBC W/AUTO DIFF WBC: CPT | Performed by: OBSTETRICS & GYNECOLOGY

## 2024-10-20 PROCEDURE — 85610 PROTHROMBIN TIME: CPT | Performed by: OBSTETRICS & GYNECOLOGY

## 2024-10-20 PROCEDURE — 85384 FIBRINOGEN ACTIVITY: CPT | Performed by: OBSTETRICS & GYNECOLOGY

## 2024-10-20 PROCEDURE — 25010000002 HYDROMORPHONE 1 MG/ML SOLUTION: Performed by: OBSTETRICS & GYNECOLOGY

## 2024-10-20 PROCEDURE — G0378 HOSPITAL OBSERVATION PER HR: HCPCS

## 2024-10-20 PROCEDURE — 25010000002 PIPERACILLIN SOD-TAZOBACTAM PER 1 G: Performed by: OBSTETRICS & GYNECOLOGY

## 2024-10-20 RX ORDER — ALPRAZOLAM 0.25 MG
0.5 TABLET ORAL 3 TIMES DAILY PRN
Status: DISCONTINUED | OUTPATIENT
Start: 2024-10-20 | End: 2024-10-21 | Stop reason: HOSPADM

## 2024-10-20 RX ORDER — HYDROCODONE BITARTRATE AND ACETAMINOPHEN 5; 325 MG/1; MG/1
1 TABLET ORAL EVERY 4 HOURS PRN
Status: DISCONTINUED | OUTPATIENT
Start: 2024-10-20 | End: 2024-10-21 | Stop reason: HOSPADM

## 2024-10-20 RX ADMIN — ALPRAZOLAM 0.5 MG: 0.25 TABLET ORAL at 00:55

## 2024-10-20 RX ADMIN — PIPERACILLIN AND TAZOBACTAM 3.38 G: 3; .375 INJECTION, POWDER, FOR SOLUTION INTRAVENOUS at 05:42

## 2024-10-20 RX ADMIN — SODIUM CHLORIDE 125 MG: 9 INJECTION, SOLUTION INTRAVENOUS at 09:05

## 2024-10-20 RX ADMIN — NICOTINE 1 PATCH: 21 PATCH, EXTENDED RELEASE TRANSDERMAL at 09:06

## 2024-10-20 RX ADMIN — ALPRAZOLAM 0.5 MG: 0.25 TABLET ORAL at 20:38

## 2024-10-20 RX ADMIN — HYDROMORPHONE HYDROCHLORIDE 0.5 MG: 1 INJECTION, SOLUTION INTRAMUSCULAR; INTRAVENOUS; SUBCUTANEOUS at 00:31

## 2024-10-20 RX ADMIN — HYDROMORPHONE HYDROCHLORIDE 0.5 MG: 1 INJECTION, SOLUTION INTRAMUSCULAR; INTRAVENOUS; SUBCUTANEOUS at 09:05

## 2024-10-20 RX ADMIN — HYDROCODONE BITARTRATE AND ACETAMINOPHEN 1 TABLET: 5; 325 TABLET ORAL at 16:23

## 2024-10-20 RX ADMIN — HYDROCODONE BITARTRATE AND ACETAMINOPHEN 1 TABLET: 5; 325 TABLET ORAL at 20:37

## 2024-10-20 NOTE — PLAN OF CARE
Goal Outcome Evaluation:  Plan of Care Reviewed With: patient        Progress: improving  Outcome Evaluation: Alert and oriented x4. VSS. Abd binder still in use. Medicated for pain and anxiety per MAR. No additional needs at this time. Plan of care ongoing.

## 2024-10-20 NOTE — PROGRESS NOTES
"  Subjective     Subjective  Patient reports:  Pain is somewhat controlled with  prescribed pain medications.  Request now for nurse to readminister .  She is  ambulating. Tolerating po -- normal.  Intake -- c/o of tolerating po solids and tolerating po liquids.   flatus.  Voiding -- without difficulty.  Sleepy.  Took an anxiolytic not too long ago when being evaluated for vulva hematoma.  Reports that the medication helped with the anxiety.    Objective     Objective:  Vital signs (most recent): Blood pressure 112/70, pulse 80, temperature 97.9 °F (36.6 °C), temperature source Oral, resp. rate 16, height 157.5 cm (62\"), weight 61.9 kg (136 lb 6.4 oz), last menstrual period 09/27/2024, SpO2 96%, not currently breastfeeding.    Physical Exam:  Incision: Dressings are dry and intact.  The hematoma was marked.  There is no tenseness in the skin.  There is no undue or unexpected tenderness it extends from the left upper trocar site to the vulva.   Lungs: normal and clear to auscultation   Abdomen: Bowel sounds are present, abdomen is soft, nondistended.  Hematoma and dressings as noted above.  Perineum with vulvar hematoma that is not tense.  No vaginal discharge or bleeding present.   Extremities:  extremities normal, atraumatic, no cyanosis or edema   Neurologic Easily arousable.  Oriented, intact       Vital Sign Min/Max    Temp  Min: 97.7 °F (36.5 °C)  Max: 98.9 °F (37.2 °C)   Pulse  Min: 59  Max: 94   Resp  Min: 12  Max: 21   BP  Min: 99/58  Max: 118/48   No data recorded   SpO2  Min: 94 %  Max: 100 %   Flow (L/min) (Oxygen Therapy)  Min: 2  Max: 2     Flowsheet Rows      Flowsheet Row First Filed Value   Admission Height 157.5 cm (62\") Documented at 10/18/2024 1008   Admission Weight 71.4 kg (157 lb 6.5 oz) Documented at 10/18/2024 1008            Intake/Output last 24 hours:    Intake/Output Summary (Last 24 hours) at 10/20/2024 0944  Last data filed at 10/19/2024 1530  Gross per 24 hour   Intake 1500 ml   Output " 1375 ml   Net 125 ml       Intake/Output this shift:  No intake/output data recorded.    Labs/Studies reviewed:  Yes   Radiology studies reviewed:   No new radiographic studies  Medications reviewed:   Yes     Assessment & Plan   Diagnoses and all orders for this visit:    1. Pelvic hematoma in female (Primary)  -     Tissue Pathology Exam; Standing  -     Tissue Pathology Exam    Other orders  -     Cancel: NPO Diet NPO Type: Strict NPO; Standing  -     Undress & Gown; Standing  -     Insert Peripheral IV; Standing  -     sodium chloride 0.9 % flush 10 mL  -     [place] Draw; Standing  -     CBC & Differential; Standing  -     Comprehensive Metabolic Panel; Standing  -     Lipase; Standing  -     Urinalysis With Microscopic If Indicated (No Culture) - Urine, Clean Catch; Standing  -     hCG, Quantitative, Pregnancy; Standing  -     Cancel: NPO Diet NPO Type: Strict NPO  -     Undress & Gown  -     Insert Peripheral IV  -     [place] Draw  -     CBC & Differential  -     Comprehensive Metabolic Panel  -     Lipase  -     Urinalysis With Microscopic If Indicated (No Culture) - Urine, Clean Catch  -     hCG, Quantitative, Pregnancy  -     HYDROmorphone (DILAUDID) injection 1 mg  -     ondansetron (ZOFRAN) injection 4 mg  -     CT Abdomen Pelvis With Contrast; Standing  -     CT Abdomen Pelvis With Contrast  -     iopamidol (ISOVUE-370) 76 % injection 100 mL  -     OB / GYN (on-call MD unless specified); Standing  -     OB / GYN (on-call MD unless specified)  -     piperacillin-tazobactam (ZOSYN) IVPB 3.375 g IVPB in 100 mL NS (VTB)  -     Lactic Acid, Plasma; Standing  -     Blood Culture - Blood,; Standing  -     Blood Culture - Blood,; Standing  -     Lactic Acid, Plasma  -     Blood Culture - Blood, Arm, Left  -     Blood Culture - Blood, Arm, Left  -     HYDROmorphone (DILAUDID) injection 1 mg  -     Initiate Observation Status; Standing  -     Vital Signs; Standing  -     Intake & Output; Standing  -     Notify  Provider; Standing  -     VTE Prophylaxis Not Indicated: Low Risk; No Risk Factors (0); Standing  -     Activity - Ad Lachelle; Standing  -     Cancel: NPO Diet NPO Type: Strict NPO; Standing  -     CBC (No Diff); Standing  -     Discontinue: piperacillin-tazobactam (ZOSYN) IVPB 3.375 g IVPB in 100 mL NS (VTB)  -     piperacillin-tazobactam (ZOSYN) IVPB 3.375 g IVPB in 100 mL NS (VTB)  -     Discontinue: HYDROmorphone (DILAUDID) injection 0.5 mg  -     ondansetron ODT (ZOFRAN-ODT) disintegrating tablet 4 mg  -     ondansetron (ZOFRAN) injection 4 mg  -     Initiate Observation Status  -     Vital Signs  -     Vital Signs  -     Vital Signs  -     Intake & Output  -     Notify Provider  -     VTE Prophylaxis Not Indicated: Low Risk; No Risk Factors (0)  -     Activity - Ad Lachelle  -     Cancel: NPO Diet NPO Type: Strict NPO  -     CBC (No Diff)  -     Hemoglobin & Hematocrit, Blood; Standing  -     Hemoglobin & Hematocrit, Blood  -     Initiate & Follow Hypercapnic Monitoring Guideline for Opioid Administration via EtCO2 and / or SpO2; Standing  -     Opioid Administration - Document EtCO2 and / or SpO2 With Each Set of Vitals & Any Change in Patient Status; Standing  -     Opioid Administration - Notify Provider Hypercapnic Monitoring; Standing  -     Cancel: Opioid Administration - Continuous Pulse Oximetry (SpO2); Standing  -     Initiate & Follow Hypercapnic Monitoring Guideline for Opioid Administration via EtCO2 and / or SpO2  -     Opioid Administration - Document EtCO2 and / or SpO2 With Each Set of Vitals & Any Change in Patient Status  -     Opioid Administration - Notify Provider Hypercapnic Monitoring  -     Cancel: Opioid Administration - Continuous Pulse Oximetry (SpO2)  -     CBC & Differential; Standing  -     Protime-INR; Standing  -     aPTT; Standing  -     Cancel: Code Status and Medical Interventions: CPR (Attempt to Resuscitate); Full Support; Standing  -     Cancel: Code Status and Medical  Interventions: CPR (Attempt to Resuscitate); Full Support  -     CBC & Differential  -     Protime-INR  -     aPTT  -     Cancel: CBC (No Diff); Standing  -     Cancel: CBC (No Diff)  -     Vital Signs  -     Vital Signs  -     Vital Signs  -     Vital Signs  -     Vital Signs  -     Vital Signs  -     Intake & Output  -     Intake & Output  -     Extra Tubes; Standing  -     Extra Tubes  -     Type & Screen; Standing  -     Type & Screen  -     Cancel: Vital Signs - Per Anesthesia Protocol; Standing  -     Cancel: Remove Scopolamine Patch 24 Hours After Application; Standing  -     Cancel: Continuous Pulse Oximetry; Standing  -     Insert Peripheral IV; Standing  -     lactated ringers infusion  -     acetaminophen (TYLENOL) tablet 1,000 mg  -     Midazolam HCl (PF) (VERSED) injection 2 mg  -     Do not give versed until pt has been seen/evaluated by anesthesia staff prior to proceure  Nursing Communication; Standing  -     Insert Peripheral IV  -     Do not give versed until pt has been seen/evaluated by anesthesia staff prior to proceure  Nursing Communication  -     Cancel: Follow Anesthesia Guidelines / Protocol; Standing  -     Insert Peripheral IV; Standing  -     Cancel: Saline Lock & Maintain IV Access; Standing  -     Discontinue: sodium chloride 0.9 % flush 10 mL  -     Discontinue: sodium chloride 0.9 % flush 10 mL  -     Discontinue: sodium chloride 0.9 % infusion 40 mL  -     Place Sequential Compression Device; Standing  -     Cancel: Maintain Sequential Compression Device; Standing  -     Cancel: POC Urine Pregnancy; Standing  -     phenazopyridine (PYRIDIUM) tablet 200 mg  -     Cancel: Remove Scopolamine Patch 24 Hours After Application  -     Cancel: Continuous Pulse Oximetry  -     Cancel: Follow Anesthesia Guidelines / Protocol  -     Cancel: Insert Peripheral IV  -     Cancel: Saline Lock & Maintain IV Access  -     Place Sequential Compression Device  -     Cancel: Maintain Sequential  Compression Device  -     Cancel: POC Urine Pregnancy  -     Pregnancy, Urine - Urine, Clean Catch; Standing  -     Pregnancy, Urine - Urine, Clean Catch  -     scopolamine patch 1 mg/72 hr  -     Discontinue: sterile water irrigation solution  -     Discontinue: bupivacaine (PF) (MARCAINE) 0.25 % injection  -     Cancel: Code Status and Medical Interventions: CPR (Attempt to Resuscitate); Full Support; Standing  -     Cancel: Code Status and Medical Interventions: CPR (Attempt to Resuscitate); Full Support  -     Cancel: Oxygen Therapy- Nasal Cannula; Titrate 1-6 LPM Per SpO2; 90 - 95%; Standing  -     Continuous Pulse Oximetry; Standing  -     Cancel: POC Glucose STAT; Standing  -     Cancel: Vital Signs Every 5 Minutes for 15 Minutes, Every 15 Minutes Thereafter.; Standing  -     Cancel: Apply Warming [place]; Standing  -     Cancel: Suction; Standing  -     Cancel: Notify Anesthesia of Any Acute Changes in Patient Condition; Standing  -     Cancel: Notify Anesthesia for Unrelieved Pain; Standing  -     Discontinue: oxyCODONE (ROXICODONE) immediate release tablet 5 mg  -     Discontinue: HYDROmorphone (DILAUDID) injection 0.25 mg  -     Discontinue: HYDROmorphone (DILAUDID) injection 0.5 mg  -     Discontinue: ondansetron (ZOFRAN) injection 4 mg  -     Discontinue: promethazine (PHENERGAN) suppository 25 mg  -     Discontinue: promethazine (PHENERGAN) tablet 25 mg  -     Discontinue: meperidine (DEMEROL) injection 12.5 mg  -     Cancel: Once Discharge Criteria to Floor Met, Follow Surgeon Orders; Standing  -     Cancel: Discharge Patient From PACU When Discharge Criteria Met; Standing  -     Cancel: Oxygen Therapy- Nasal Cannula; Titrate 1-6 LPM Per SpO2; 90 - 95%  -     Continuous Pulse Oximetry  -     Cancel: POC Glucose STAT  -     Cancel: Vital Signs Every 5 Minutes for 15 Minutes, Every 15 Minutes Thereafter.  -     Cancel: Apply Warming [place]  -     Cancel: Notify Anesthesia of Any Acute Changes in  Patient Condition  -     Cancel: Notify Anesthesia for Unrelieved Pain  -     Cancel: Once Discharge Criteria to Floor Met, Follow Surgeon Orders  -     Cancel: Discharge Patient From PACU When Discharge Criteria Met  -     piperacillin-tazobactam (ZOSYN) IVPB 3.375 g IVPB in 100 mL NS (VTB)  -     docusate sodium (COLACE) capsule 100 mg  -     ferrous sulfate tablet 325 mg  -     ibuprofen (ADVIL,MOTRIN) tablet 600 mg  -     Code Status and Medical Interventions: CPR (Attempt to Resuscitate); Full Support; Standing  -     Vital Signs Per Hospital Policy; Standing  -     Turn Cough Deep Breathe; Standing  -     Incentive Spirometry; Standing  -     Advance Diet As Tolerated -; Standing  -     Appropriate VTE Prophylaxis Orders in Place; Standing  -     Ambulate Patient; Standing  -     Cancel: Activity - Ad Lachelle; Standing  -     CBC & Differential; Standing  -     ketorolac (TORADOL) injection 15 mg  -     ondansetron ODT (ZOFRAN-ODT) disintegrating tablet 4 mg  -     ondansetron (ZOFRAN) injection 4 mg  -     Hemoglobin & Hematocrit, Blood; Standing  -     Hemoglobin & Hematocrit, Blood  -     Code Status and Medical Interventions: CPR (Attempt to Resuscitate); Full Support  -     Vital Signs Per Hospital Policy  -     Vital Signs Per Hospital Policy  -     Vital Signs Per Hospital Policy  -     Turn Cough Deep Breathe  -     Incentive Spirometry  -     Incentive Spirometry  -     Incentive Spirometry  -     Incentive Spirometry  -     Incentive Spirometry  -     Incentive Spirometry  -     Incentive Spirometry  -     Incentive Spirometry  -     Incentive Spirometry  -     Advance Diet As Tolerated -  -     Appropriate VTE Prophylaxis Orders in Place  -     Ambulate Patient  -     Cancel: Activity - Ad Lachelle  -     ferric gluconate (FERRLECIT)125 MG in sodium chloride 0.9 % 100 mL IVPB  -     Iron Profile; Standing  -     Ferritin; Standing  -     Iron Profile  -     Ferritin  -     Transfer Patient; Standing  -      Transfer Patient  -     Incentive Spirometry  -     Cancel: Diet: Regular/House; Fluid Consistency: Thin (IDDSI 0); Standing  -     Cancel: Diet: Regular/House; Fluid Consistency: Thin (IDDSI 0)  -     Incentive Spirometry  -     nicotine (NICODERM CQ) 21 MG/24HR patch 1 patch  -     Incentive Spirometry  -     Incentive Spirometry  -     CBC & Differential  -     aPTT; Standing  -     Fibrinogen; Standing  -     Protime-INR; Standing  -     Vital Signs  -     Vital Signs  -     Vital Signs  -     Vital Signs  -     Vital Signs  -     Vital Signs  -     Intake & Output  -     Intake & Output  -     Vital Signs Per Hospital Policy  -     Vital Signs Per Hospital Policy  -     Vital Signs Per Hospital Policy  -     Vital Signs Per Hospital Policy  -     Vital Signs Per Hospital Policy  -     Vital Signs Per Hospital Policy  -     Ambulate Patient  -     Ambulate Patient  -     aPTT  -     Fibrinogen  -     Protime-INR  -     ALPRAZolam (XANAX) tablet 0.5 mg  -     Extra Tubes; Standing  -     Extra Tubes  -     Incentive Spirometry  -     Incentive Spirometry  -     Incentive Spirometry; Standing  -     Activity - Ad Lachelle; Standing  -     HYDROcodone-acetaminophen (NORCO) 5-325 MG per tablet 1 tablet  -     Apply Ice To Affected Area; Standing  -     Hemoglobin & Hematocrit, Blood; Standing  -     Diet: Regular/House; Fluid Consistency: Thin (IDDSI 0); Standing  -     Incentive Spirometry  -     Incentive Spirometry  -     Incentive Spirometry  -     Incentive Spirometry  -     Incentive Spirometry  -     Incentive Spirometry  -     Incentive Spirometry  -     Incentive Spirometry  -     Incentive Spirometry  -     Activity - Ad Lachelle  -     Diet: Regular/House; Fluid Consistency: Thin (IDDSI 0)        All questions answered.      Abdominal pain      Post op day 1 procedure(s):  RIGHT OOPHORECTOMY LAPAROSCOPIC AND EVACTION OF ENDOPERITONEUM; vulva and incisional hematoma does not appear to have active bleeding.  Anxiety  improved with anxiolytic..  Anemia    Plan: Surveillance for bleeding or expansion of the hematoma, recheck H&H tomorrow; pain management and anxiety management; transition to oral pain medications maximize postoperative care with incentive spirometer activity and oral fluid intake; continue post op care and plan for discharge tomorrow.  Father present all questions answered.  Care discussed with the nurse              Rachelle Marroquin MD  10/20/24  09:44 EDT

## 2024-10-20 NOTE — PLAN OF CARE
Goal Outcome Evaluation:  Plan of Care Reviewed With: patient, parent        Progress: improving  Outcome Evaluation: alert and oriented x4, VSS, only medicated for pain twice today, abd binder still in use, swelling and bruising noted to labia, no other needs noted, family at bedside, will continue plan of care

## 2024-10-20 NOTE — PROGRESS NOTES
I was called by nurse regarding possibly expanding incisional hematoma.  Patient reports some increased pain but more than anything she reports anxiety and wants something to help with that.    O) Afebrile and vital signs are stable  Abdomen: Tender to palpation.  There is erythema and slight induration extending from left trocar site all the way to left labia majora.    A/P   POD  #1 S/P laparoscopy and RSO and evacuation of hematoma.  Patient likely has incisional hematoma.  I discussed with patient and mom that generally we manage incisional hematoma expectantly.   Will hold Toradol and continue Dilaudid. Will add coags to her AM blood draw. Will give her medication for anxiety and to help her sleep per her request.

## 2024-10-21 ENCOUNTER — TELEPHONE (OUTPATIENT)
Dept: INTERNAL MEDICINE | Facility: CLINIC | Age: 24
End: 2024-10-21
Payer: COMMERCIAL

## 2024-10-21 ENCOUNTER — READMISSION MANAGEMENT (OUTPATIENT)
Dept: CALL CENTER | Facility: HOSPITAL | Age: 24
End: 2024-10-21
Payer: COMMERCIAL

## 2024-10-21 VITALS
BODY MASS INDEX: 25.1 KG/M2 | SYSTOLIC BLOOD PRESSURE: 105 MMHG | HEART RATE: 80 BPM | WEIGHT: 136.4 LBS | RESPIRATION RATE: 18 BRPM | HEIGHT: 62 IN | OXYGEN SATURATION: 99 % | DIASTOLIC BLOOD PRESSURE: 54 MMHG | TEMPERATURE: 98.6 F

## 2024-10-21 PROBLEM — S30.1XXA HEMATOMA OF ABDOMINAL WALL: Status: ACTIVE | Noted: 2024-10-21

## 2024-10-21 LAB
HCT VFR BLD AUTO: 27.6 % (ref 34–46.6)
HGB BLD-MCNC: 8.8 G/DL (ref 12–15.9)
HOLD SPECIMEN: NORMAL

## 2024-10-21 PROCEDURE — 85014 HEMATOCRIT: CPT | Performed by: OBSTETRICS & GYNECOLOGY

## 2024-10-21 PROCEDURE — 85018 HEMOGLOBIN: CPT | Performed by: OBSTETRICS & GYNECOLOGY

## 2024-10-21 PROCEDURE — G0378 HOSPITAL OBSERVATION PER HR: HCPCS

## 2024-10-21 RX ORDER — NICOTINE 21 MG/24HR
1 PATCH, TRANSDERMAL 24 HOURS TRANSDERMAL
Qty: 5 PATCH | Refills: 0 | Status: SHIPPED | OUTPATIENT
Start: 2024-10-21

## 2024-10-21 RX ORDER — ALPRAZOLAM 0.5 MG
0.5 TABLET ORAL 3 TIMES DAILY PRN
Qty: 15 TABLET | Refills: 0 | Status: SHIPPED | OUTPATIENT
Start: 2024-10-21

## 2024-10-21 RX ORDER — HYDROCODONE BITARTRATE AND ACETAMINOPHEN 5; 325 MG/1; MG/1
1 TABLET ORAL EVERY 4 HOURS PRN
Qty: 20 TABLET | Refills: 0 | Status: SHIPPED | OUTPATIENT
Start: 2024-10-21

## 2024-10-21 RX ORDER — ONDANSETRON 4 MG/1
4 TABLET, ORALLY DISINTEGRATING ORAL EVERY 6 HOURS PRN
Qty: 10 TABLET | Refills: 0 | Status: SHIPPED | OUTPATIENT
Start: 2024-10-21

## 2024-10-21 RX ADMIN — NICOTINE 1 PATCH: 21 PATCH, EXTENDED RELEASE TRANSDERMAL at 09:20

## 2024-10-21 RX ADMIN — HYDROCODONE BITARTRATE AND ACETAMINOPHEN 1 TABLET: 5; 325 TABLET ORAL at 05:21

## 2024-10-21 RX ADMIN — FERROUS SULFATE TAB 325 MG (65 MG ELEMENTAL FE) 325 MG: 325 (65 FE) TAB at 09:20

## 2024-10-21 NOTE — OUTREACH NOTE
Prep Survey      Flowsheet Row Responses   [facility] patient discharged from? [facility]   Is LACE score < 7 ? Yes   Eligibility [facility]   Date of Admission 10/18/24   Date of Discharge 10/21/24   Discharge Disposition Home or Self Care   Discharge diagnosis Hemorrhagic ovarian cyst,  RIGHT OOPHORECTOMY LAPAROSCOPIC AND EVACTION OF ENDOPERITONEUM   Does the patient have one of the following disease processes/diagnoses(primary or secondary)? General Surgery   Does the patient have Home health ordered? No   Is there a DME ordered? No   Prep survey completed? Yes            Ava Landaverde Registered Nurse

## 2024-10-21 NOTE — TELEPHONE ENCOUNTER
CHEKO FULL. Sent MYCHART AS WELL.    Hospital called and asked for hospital follow up  Spoke with Padmini MONTEZ and she said the follow up will need to be with

## 2024-10-21 NOTE — PLAN OF CARE
Goal Outcome Evaluation:                 VSS and remains alert and oriented x 4 this shift. Reports pain as 2/10 and has improved from previous day. Surgical incision x 4 intact. Pt ambulating to bathroom independently and tolerating food without nausea or vomitting.

## 2024-10-21 NOTE — DISCHARGE SUMMARY
Date of Discharge:  10/21/2024    Discharge Diagnosis: Hemorrhagic right ovarian cyst; status post right oophorectomy via laparoscopy; evacuation of hemoperitoneum; postop wound hematoma; IV iron infusion; management of anxiety    Presenting Problem/History of Present Illness  Active Hospital Problems    Diagnosis  POA    Abdominal pain [R10.9]  Yes      Resolved Hospital Problems    Diagnosis Date Resolved POA    **Hemorrhagic ovarian cyst [N83.209] 10/19/2024 Yes     Priority: High    Pelvic hematoma in female [N94.89] 10/19/2024 Yes     Priority: High          Hospital Course  Patient is a 24 y.o. female presented with sudden onset of severe abdominal pain 3 weeks postop resection of complex right ovarian cyst (history of left oophorectomy) found to have a hemorrhagic right ovarian cyst and hemoperitoneum.  She underwent a right oophorectomy with evacuation of hemoperitoneum.  She developed a post op wound hematoma that appears to have stabilized.  Her hemoglobin has stabilized and is currently 8.8.  She reports a readiness for discharge.  She is tolerating p.o. foods and fluids voiding passing gas and is getting relief from her pain with medications.      Procedures Performed    Procedure(s):  RIGHT OOPHORECTOMY LAPAROSCOPIC AND EVACTION OF ENDOPERITONEUM  -------------------       Consults:   Consults       Date and Time Order Name Status Description    10/18/2024 12:24 PM OB / GYN (on-call MD unless specified)              Pertinent Test Results:    New Results - Lab  Sorted by update time  Updated   Order   10/21/24 0516  Extra Tubes  Collected: 10/21/24 0501  Final result  Specimen: Blood, Venous Line           10/21/24 0547  Green Top (Gel)  Collected: 10/21/24 0509  Final result  Specimen: Blood    Extra Tube Hold for add-ons.             10/21/24 0504  Hemoglobin & Hematocrit, Blood  Collected: 10/21/24 0456  Final result  Specimen: Blood from Hand, Right    Hemoglobin 8.8 Low  g/dL Hematocrit  27.6 Low  %          10/20/24 0559  aPTT  Collected: 10/20/24 0507  Final result  Specimen: Blood from Arm, Right    PTT 39.4 High  seconds            10/20/24 0559  Fibrinogen  Collected: 10/20/24 0507  Final result  Specimen: Blood from Arm, Right    Fibrinogen 349 mg/dL            10/20/24 0559  Protime-INR  Collected: 10/20/24 0507  Final result  Specimen: Blood from Arm, Right    Protime 15.1 High  Seconds INR 1.16 High           10/20/24 0531  Extra Tubes  Collected: 10/20/24 0507  Final result  Specimen: Blood from Arm, Right           10/20/24 0531  Green Top (Gel)  Collected: 10/20/24 0507  Final result  Specimen: Blood from Arm, Right    Extra Tube Hold for add-ons.             10/20/24 0525  CBC & Differential  Collected: 10/20/24 0507  Final result  Specimen: Blood from Arm, Right           10/20/24 0525  CBC Auto Differential  Collected: 10/20/24 0507  Final result  Specimen: Blood from Arm, Right    WBC 12.21 High  10*3/mm3 Lymphocyte % 12.7 Low  %   RBC 2.52 Low  10*6/mm3 Monocyte % 8.8 %   Hemoglobin 8.4 Low  g/dL Eosinophil % 0.2 Low  %   Hematocrit 25.0 Low  % Basophil % 0.2 %   MCV 99.2 High  fL Immature Grans % 0.4 %   MCH 33.3 High  pg Neutrophils, Absolute 9.47 High  10*3/mm3   MCHC 33.6 g/dL Lymphocytes, Absolute 1.55 10*3/mm3   RDW 12.6 % Monocytes, Absolute 1.08 High  10*3/mm3   RDW-SD 45.7 fl Eosinophils, Absolute 0.03 10*3/mm3   MPV 9.5 fL Basophils, Absolute 0.03 10*3/mm3   Platelets 263 10*3/mm3 Immature Grans, Absolute 0.05 10*3/mm3   Neutrophil % 77.7 High  % nRBC 0.0 /100 WBC               New Results - Micro  Sorted by update time  Updated   Order   10/20/24 1330  Blood Culture - Blood, Arm, Left  Collected: 10/18/24 1312  Preliminary result  Specimen: Blood from Arm, Left    Blood Culture No growth at 2 days P            10/20/24 1315  Blood Culture - Blood, Arm, Left  Collected: 10/18/24 1310  Preliminary result  Specimen: Blood from Arm, Left    Blood Culture No  growth at 2 days P            Pathology report is pending  Condition on Discharge: Good    Vital Signs  Temp:  [98.6 °F (37 °C)] 98.6 °F (37 °C)  Heart Rate:  [] 80  Resp:  [16-18] 18  BP: (100-117)/(54-71) 105/54    Physical Exam:   General Appearance: alert, appears stated age, and cooperative  Eyes: lids and lashes normal, conjunctivae and sclerae normal, no icterus, no pallor, and corneas clear  Lungs: clear to auscultation, respirations regular, respirations even, and respirations unlabored  Heart: S1-S2 regular rate and rhythm  Abdomen: Bowel sounds present; abdomen soft; dressings are clean dry and intact; extension of hematoma inferiorly to thigh region.  Area was marked.  Hematoma is less intense in the vulvar region.  It did not extend beyond any of the other margins.  Pelvic: Vulva hematoma is nontense and less prominent than yesterday.  Perineum is dry  Extremities: moves extremities well, no edema, no cyanosis, and no redness  Skin: Warm dry and intact  Neurologic: Mental Status orientated to person, place, time and situation  Psych: normal    Discharge Disposition      Discharge Medications     Discharge Medications        Changes to Medications        Instructions Start Date   ferrous sulfate 325 (65 FE) MG tablet  What changed: additional instructions   325 mg, Oral, 3 Times Weekly             Continue These Medications        Instructions Start Date   amphetamine-dextroamphetamine XR 20 MG 24 hr capsule  Commonly known as: ADDERALL XR   20 mg, Oral, Daily      docusate sodium 100 MG capsule   100 mg, Oral, Daily PRN      HYDROcodone-acetaminophen 5-325 MG per tablet  Commonly known as: Norco   1 tablet, Oral, Every 6 Hours PRN      ibuprofen 600 MG tablet  Commonly known as: ADVIL,MOTRIN   600 mg, Oral, Every 6 Hours PRN      Liletta (52 MG) 19.5 MCG/DAY intrauterine device  Generic drug: Levonorgestrel   No dose, route, or frequency recorded.               Discharge Diet:     Activity at  Discharge:     Follow-up Appointments  No future appointments.      Test Results Pending at Discharge  Pending Labs       Order Current Status    Tissue Pathology Exam Collected (10/19/24 1121)    Blood Culture - Blood, Arm, Left Preliminary result    Blood Culture - Blood, Arm, Left Preliminary result             Rachelle Marroquin MD  10/21/24  08:51 EDT    Time: Discharge 30 min

## 2024-10-21 NOTE — PLAN OF CARE
Goal Outcome Evaluation:  Plan of Care Reviewed With: patient        Progress: improving  Outcome Evaluation: A&O x4. VSS. Medicated x2 for pain on shift, see MAR. Family remained at bedside. All needs met at this time. Care ongoing.

## 2024-10-22 ENCOUNTER — TRANSITIONAL CARE MANAGEMENT TELEPHONE ENCOUNTER (OUTPATIENT)
Dept: CALL CENTER | Facility: HOSPITAL | Age: 24
End: 2024-10-22
Payer: COMMERCIAL

## 2024-10-22 LAB
CYTO UR: NORMAL
LAB AP CASE REPORT: NORMAL
LAB AP CLINICAL INFORMATION: NORMAL
PATH REPORT.FINAL DX SPEC: NORMAL
PATH REPORT.GROSS SPEC: NORMAL

## 2024-10-22 NOTE — OUTREACH NOTE
Call Center TCM Note      Flowsheet Row Responses   [facility] patient discharged from? [facility]   Does the patient have one of the following disease processes/diagnoses(primary or secondary)? General Surgery   TCM attempt successful? No   Unsuccessful attempts Attempt 1   Call Status Voice mail issues   Comments Post-op  October 23, 2024 @ 11:00 AM            Valerie Melgar RN    10/22/2024, 08:41 EDT

## 2024-10-22 NOTE — OUTREACH NOTE
Call Center TCM Note      Flowsheet Row Responses   [facility] patient discharged from? [facility]   Does the patient have one of the following disease processes/diagnoses(primary or secondary)? General Surgery   TCM attempt successful? Yes   Call start time 1510   Call end time 1512   Discharge diagnosis Hemorrhagic ovarian cyst,  RIGHT OOPHORECTOMY LAPAROSCOPIC AND EVACTION OF ENDOPERITONEUM   Person spoke with today (if not patient) and relationship pt   Meds reviewed with patient/caregiver? Yes   Is the patient having any side effects they believe may be caused by any medication additions or changes? No   Does the patient have all medications related to this admission filled (includes all antibiotics, pain medications, etc.) Yes   Is the patient taking all medications as directed (includes completed medication regime)? Yes   Comments Post-op  October 23, 2024 @ 11:00 AM   Does the patient have an appointment with their PCP within 7-14 days of discharge? No appointments available   Nursing Interventions Routed TCM call to PCP office, PCP office requested to make appointment - message sent   Psychosocial issues? No   Did the patient receive a copy of their discharge instructions? Yes   What is the patient's perception of their health status since discharge? Improving   Nursing interventions Nurse provided patient education   Is the patient /caregiver able to teach back basic post-op care? Lifting as instructed by MD in discharge instructions, Take showers only when approved by MD-sponge bathe until then, No tub bath, swimming, or hot tub until instructed by MD, Keep incision areas clean,dry and protected   Is the patient/caregiver able to teach back signs and symptoms of incisional infection? Increased redness, swelling or pain at the incisonal site, Increased drainage or bleeding, Incisional warmth, Pus or odor from incision, Fever   Is the patient/caregiver able to teach back steps to recovery at home? Rest  and rebuild strength, gradually increase activity, Eat a well-balance diet   If the patient is a current smoker, are they able to teach back resources for cessation? Not a smoker   Is the patient/caregiver able to teach back the hierarchy of who to call/visit for symptoms/problems? PCP, Specialist, Home health nurse, Urgent Care, ED, 911 Yes   TCM call completed? Yes   Wrap up additional comments Pt denies any fever, or increased redness, swelling, drainage at incisional site. Reviewed AVS/meds with pt. Pt verified post-op appt on 10/23/24.   Call end time 1512   Would this patient benefit from a Referral to [facility] Social Work? No   Is the patient interested in additional calls from an ambulatory ? No            Valerie Melgar RN    10/22/2024, 15:13 EDT

## 2024-10-23 ENCOUNTER — TELEPHONE (OUTPATIENT)
Dept: INTERNAL MEDICINE | Facility: CLINIC | Age: 24
End: 2024-10-23
Payer: COMMERCIAL

## 2024-10-23 LAB
BACTERIA SPEC AEROBE CULT: NORMAL
BACTERIA SPEC AEROBE CULT: NORMAL

## 2024-11-05 ENCOUNTER — TRANSCRIBE ORDERS (OUTPATIENT)
Dept: LAB | Facility: HOSPITAL | Age: 24
End: 2024-11-05
Payer: COMMERCIAL

## 2024-11-05 ENCOUNTER — LAB (OUTPATIENT)
Dept: LAB | Facility: HOSPITAL | Age: 24
End: 2024-11-05
Payer: COMMERCIAL

## 2024-11-05 DIAGNOSIS — E89.40 SURGICAL MENOPAUSE: ICD-10-CM

## 2024-11-05 DIAGNOSIS — E89.40 SURGICAL MENOPAUSE: Primary | ICD-10-CM

## 2024-11-05 LAB — FSH SERPL-ACNC: 60.6 MIU/ML

## 2024-11-05 PROCEDURE — 36415 COLL VENOUS BLD VENIPUNCTURE: CPT

## 2024-11-05 PROCEDURE — 83001 ASSAY OF GONADOTROPIN (FSH): CPT

## 2024-12-02 NOTE — PROGRESS NOTES
Chief Complaint  Annual Exam     Subjective          Janey Wakefield presents to [facility]  History of Present Illness    History of Present Illness    Prep for Surgery with Rachelle Marroquin MD (10/19/2024)     Quit job this year. Hasn't gotten a new one   Moved back home with her mom     Has had hemrrhoids in the past     Anxiety  Presents for follow-upl visit. Symptoms include decreased concentration, depressed mood, excessive worry, insomnia, irritability, nervous/anxious behavior and obsessions. Patient reports no chest pain, compulsions, confusion, dizziness, dry mouth, feeling of choking, hyperventilation, impotence, malaise, muscle tension, nausea, palpitations, panic, restlessness, shortness of breath or suicidal ideas. Symptoms occur constantly. The severity of symptoms is moderate and interfering with daily activities.     Her past medical history is significant for depression. Past treatments include SSRIs and non-SSRI antidepressants. The treatment provided no relief. Compliance with prior treatments has been good.     Depression  Visit Type: follow-up  Patient presents with the following symptoms: decreased concentration, depressed mood, excessive worry, insomnia, irritability, nervousness/anxiety and obsessions.  Patient is not experiencing: anhedonia, chest pain, choking sensation, compulsions, confusion, dizziness, dry mouth, fatigue, feelings of hopelessness, feelings of worthlessness, hypersomnia, hyperventilation, impotence, malaise, memory impairment, muscle tension, nausea, palpitations, panic, psychomotor agitation, psychomotor retardation, restlessness, shortness of breath, suicidal ideas, suicidal planning, thoughts of death, weight gain and weight loss.  Severity: interfering with daily activities   Treatment tried: non-SSRI antidepressants and SSRI  Compliance with treatment: good  Improvement on treatment: no relief  Current Outpatient  "Medications   Medication Instructions    busPIRone (BUSPAR) 7.5 mg, Oral, 3 Times Daily PRN    ferrous sulfate 325 mg, Oral, 3 Times Weekly    Levonorgestrel (Liletta, 52 MG,) 19.5 MCG/DAY intrauterine device No dose, route, or frequency recorded.       The following portions of the patient's history were reviewed and updated as appropriate: allergies, current medications, past family history, past medical history, past social history, past surgical history, and problem list.    Objective   Vital Signs:   /76   Pulse 102   Temp 96.2 °F (35.7 °C)   Ht 157.5 cm (62\")   Wt 62.6 kg (138 lb)   SpO2 98%   BMI 25.24 kg/m²     BP Readings from Last 3 Encounters:   12/05/24 117/76   10/21/24 105/54   09/28/24 106/63     Wt Readings from Last 3 Encounters:   12/05/24 62.6 kg (138 lb)   10/18/24 61.9 kg (136 lb 6.4 oz)   09/27/24 71.1 kg (156 lb 12 oz)     BMI is within normal parameters. No other follow-up for BMI required.     Physical Exam  Constitutional:       Appearance: She is overweight.          Appearance: No acute distress, well-nourished  Head: normocephalic, atraumatic  Eyes: extraocular movements intact, no scleral icterus, no conjunctival injection  Ears, Nose, and Throat: external ears normal, nares patent, moist mucous membranes  Cardiovascular: regular rate and rhythm. no murmurs, rubs, or gallops. no edema  Respiratory: breathing comfortably, symmetric chest rise, clear to auscultation bilaterally. No wheezes, rales, or rhonchi.  Neuro: alert and oriented to time, place, and person. Normal gait  Psych: normal mood and affect     Physical Exam        Result Review :   The following data was reviewed by: PAO Veloz on 12/05/2024:  Common labs          10/20/2024    05:07 10/21/2024    04:56 12/5/2024    16:10   Common Labs   Glucose   67    BUN   14    Creatinine   0.81    Sodium   138    Potassium   4.4    Chloride   100    Calcium   9.7    Albumin   4.4    Total Bilirubin   0.3  "   Alkaline Phosphatase   77    AST (SGOT)   19    ALT (SGPT)   15    WBC 12.21   13.89    Hemoglobin 8.4  8.8  14.6    Hematocrit 25.0  27.6  42.8    Platelets 263   352    Total Cholesterol   212    Triglycerides   71    HDL Cholesterol   88    LDL Cholesterol    111        Results           Lab Results   Component Value Date    SARSANTIGEN Not Detected 09/25/2021    RAPSCRN Negative 08/10/2021    INR 1.16 (H) 10/20/2024    BILIRUBINUR Negative 10/18/2024            Assessment and Plan    Diagnoses and all orders for this visit:    1. Annual physical exam (Primary)  -     TSH Rfx On Abnormal To Free T4  -     Lipid Panel  -     Comprehensive Metabolic Panel  -     CBC & Differential    2. Screening for thyroid disorder  -     TSH Rfx On Abnormal To Free T4    3. Screening for lipid disorders  -     Lipid Panel    4. Iron deficiency  -     Iron and TIBC    5. Wheezing  -     Complete PFT - Pre & Post Bronchodilator; Future    6. Encounter for immunization  -     Pneumococcal Conjugate Vaccine 20-Valent (PCV20)  -     Tdap Vaccine => 8yo IM (BOOSTRIX/ADACEL)    7. Hemorrhoids, unspecified hemorrhoid type  -     Ambulatory Referral to General Surgery    8. Generalized anxiety disorder  -     busPIRone (BUSPAR) 7.5 MG tablet; Take 1 tablet by mouth 3 (Three) Times a Day As Needed (anxiety).    9. Mood disorder    10. Overweight (BMI 25.0-29.9)  Assessment & Plan:  Patient's (Body mass index is 25.24 kg/m².) indicates that they are overweight with health conditions that include none . Weight is unchanged. BMI is above average; BMI management plan is completed. We discussed low calorie, low carb based diet program, portion control, and increasing exercise.         Advised on diet, physical activity, sunscreen, helmet, texting and driving, etc    Assessment & Plan      Medications Discontinued During This Encounter   Medication Reason    ALPRAZolam (XANAX) 0.5 MG tablet Historical Med - Therapy completed     amphetamine-dextroamphetamine XR (ADDERALL XR) 20 MG 24 hr capsule Historical Med - Therapy completed    HYDROcodone-acetaminophen (NORCO) 5-325 MG per tablet Historical Med - Therapy completed    ondansetron ODT (ZOFRAN-ODT) 4 MG disintegrating tablet Historical Med - Therapy completed    naloxone (NARCAN) 4 MG/0.1ML nasal spray Historical Med - Therapy completed    nicotine (NICODERM CQ) 21 MG/24HR patch Historical Med - Therapy completed    busPIRone (BUSPAR) 7.5 MG tablet Reorder          Follow Up   Return in about 1 year (around 12/5/2025) for Annual physical.  Patient was given instructions and counseling regarding her condition or for health maintenance advice. Please see specific information pulled into the AVS if appropriate.       PAO Veloz  12/06/24  10:38 EST      Patient or patient representative verbalized consent for the use of Ambient Listening during the visit with  PAO Veloz for chart documentation. 12/6/2024  10:38 EST

## 2024-12-05 ENCOUNTER — OFFICE VISIT (OUTPATIENT)
Dept: INTERNAL MEDICINE | Facility: CLINIC | Age: 24
End: 2024-12-05
Payer: COMMERCIAL

## 2024-12-05 VITALS
DIASTOLIC BLOOD PRESSURE: 76 MMHG | WEIGHT: 138 LBS | HEART RATE: 102 BPM | BODY MASS INDEX: 25.4 KG/M2 | HEIGHT: 62 IN | TEMPERATURE: 96.2 F | OXYGEN SATURATION: 98 % | SYSTOLIC BLOOD PRESSURE: 117 MMHG

## 2024-12-05 DIAGNOSIS — F41.1 GENERALIZED ANXIETY DISORDER: ICD-10-CM

## 2024-12-05 DIAGNOSIS — F39 MOOD DISORDER: ICD-10-CM

## 2024-12-05 DIAGNOSIS — Z13.29 SCREENING FOR THYROID DISORDER: ICD-10-CM

## 2024-12-05 DIAGNOSIS — Z00.00 ANNUAL PHYSICAL EXAM: Primary | ICD-10-CM

## 2024-12-05 DIAGNOSIS — E66.3 OVERWEIGHT (BMI 25.0-29.9): ICD-10-CM

## 2024-12-05 DIAGNOSIS — Z23 ENCOUNTER FOR IMMUNIZATION: ICD-10-CM

## 2024-12-05 DIAGNOSIS — E61.1 IRON DEFICIENCY: ICD-10-CM

## 2024-12-05 DIAGNOSIS — R06.2 WHEEZING: ICD-10-CM

## 2024-12-05 DIAGNOSIS — K64.9 HEMORRHOIDS, UNSPECIFIED HEMORRHOID TYPE: ICD-10-CM

## 2024-12-05 DIAGNOSIS — Z13.220 SCREENING FOR LIPID DISORDERS: ICD-10-CM

## 2024-12-05 LAB
BASOPHILS # BLD AUTO: 0.06 10*3/MM3 (ref 0–0.2)
BASOPHILS NFR BLD AUTO: 0.4 % (ref 0–1.5)
DEPRECATED RDW RBC AUTO: 38.2 FL (ref 37–54)
EOSINOPHIL # BLD AUTO: 0.05 10*3/MM3 (ref 0–0.4)
EOSINOPHIL NFR BLD AUTO: 0.4 % (ref 0.3–6.2)
ERYTHROCYTE [DISTWIDTH] IN BLOOD BY AUTOMATED COUNT: 10.9 % (ref 12.3–15.4)
HCT VFR BLD AUTO: 42.8 % (ref 34–46.6)
HGB BLD-MCNC: 14.6 G/DL (ref 12–15.9)
IMM GRANULOCYTES # BLD AUTO: 0.06 10*3/MM3 (ref 0–0.05)
IMM GRANULOCYTES NFR BLD AUTO: 0.4 % (ref 0–0.5)
LYMPHOCYTES # BLD AUTO: 1.59 10*3/MM3 (ref 0.7–3.1)
LYMPHOCYTES NFR BLD AUTO: 11.4 % (ref 19.6–45.3)
MCH RBC QN AUTO: 32.5 PG (ref 26.6–33)
MCHC RBC AUTO-ENTMCNC: 34.1 G/DL (ref 31.5–35.7)
MCV RBC AUTO: 95.3 FL (ref 79–97)
MONOCYTES # BLD AUTO: 0.94 10*3/MM3 (ref 0.1–0.9)
MONOCYTES NFR BLD AUTO: 6.8 % (ref 5–12)
NEUTROPHILS NFR BLD AUTO: 11.19 10*3/MM3 (ref 1.7–7)
NEUTROPHILS NFR BLD AUTO: 80.6 % (ref 42.7–76)
NRBC BLD AUTO-RTO: 0 /100 WBC (ref 0–0.2)
PLATELET # BLD AUTO: 352 10*3/MM3 (ref 140–450)
PMV BLD AUTO: 10 FL (ref 6–12)
RBC # BLD AUTO: 4.49 10*6/MM3 (ref 3.77–5.28)
WBC NRBC COR # BLD AUTO: 13.89 10*3/MM3 (ref 3.4–10.8)

## 2024-12-05 PROCEDURE — 83540 ASSAY OF IRON: CPT | Performed by: NURSE PRACTITIONER

## 2024-12-05 PROCEDURE — 80061 LIPID PANEL: CPT | Performed by: NURSE PRACTITIONER

## 2024-12-05 PROCEDURE — 84443 ASSAY THYROID STIM HORMONE: CPT | Performed by: NURSE PRACTITIONER

## 2024-12-05 PROCEDURE — 90472 IMMUNIZATION ADMIN EACH ADD: CPT | Performed by: NURSE PRACTITIONER

## 2024-12-05 PROCEDURE — 99395 PREV VISIT EST AGE 18-39: CPT | Performed by: NURSE PRACTITIONER

## 2024-12-05 PROCEDURE — 90715 TDAP VACCINE 7 YRS/> IM: CPT | Performed by: NURSE PRACTITIONER

## 2024-12-05 PROCEDURE — 85025 COMPLETE CBC W/AUTO DIFF WBC: CPT | Performed by: NURSE PRACTITIONER

## 2024-12-05 PROCEDURE — 90677 PCV20 VACCINE IM: CPT | Performed by: NURSE PRACTITIONER

## 2024-12-05 PROCEDURE — 90471 IMMUNIZATION ADMIN: CPT | Performed by: NURSE PRACTITIONER

## 2024-12-05 PROCEDURE — 80053 COMPREHEN METABOLIC PANEL: CPT | Performed by: NURSE PRACTITIONER

## 2024-12-05 PROCEDURE — 99214 OFFICE O/P EST MOD 30 MIN: CPT | Performed by: NURSE PRACTITIONER

## 2024-12-05 PROCEDURE — 84466 ASSAY OF TRANSFERRIN: CPT | Performed by: NURSE PRACTITIONER

## 2024-12-05 RX ORDER — BUSPIRONE HYDROCHLORIDE 7.5 MG/1
7.5 TABLET ORAL 3 TIMES DAILY PRN
COMMUNITY
End: 2024-12-06 | Stop reason: SDUPTHER

## 2024-12-05 NOTE — LETTER
[facility]  Vaccine Consent Form    Patient Name:  Janey Wakefield  Patient :  2000     Vaccine(s) Ordered    Pneumococcal Conjugate Vaccine 20-Valent (PCV20)  Tdap Vaccine => 8yo IM (BOOSTRIX/ADACEL)        Screening Checklist  The following questions should be completed prior to vaccination. If you answer “yes” to any question, it does not necessarily mean you should not be vaccinated. It just means we may need to clarify or ask more questions. If a question is unclear, please ask your healthcare provider to explain it.    Yes No   Any fever or moderate to severe illness today (mild illness and/or antibiotic treatment are not contraindications)?     Do you have a history of a serious reaction to any previous vaccinations, such as anaphylaxis, encephalopathy within 7 days, Guillain-Columbia syndrome within 6 weeks, seizure?     Have you received any live vaccine(s) (e.g MMR, VALERIO) or any other vaccines in the last month (to ensure duplicate doses aren't given)?     Do you have an anaphylactic allergy to latex (DTaP, DTaP-IPV, Hep A, Hep B, MenB, RV, Td, Tdap), baker’s yeast (Hep B, HPV), polysorbates (RSV, nirsevimab, PCV 20, Rotavirrus, Tdap, Shingrix), or gelatin (VALERIO, MMR)?     Do you have an anaphylactic allergy to neomycin (Rabies, VALERIO, MMR, IPV, Hep A), polymyxin B (IPV), or streptomycin (IPV)?      Any cancer, leukemia, AIDS, or other immune system disorder? (VALERIO, MMR, RV)     Do you have a parent, brother, or sister with an immune system problem (if immune competence of vaccine recipient clinically verified, can proceed)? (MMR, VALERIO)     Any recent steroid treatments for >2 weeks, chemotherapy, or radiation treatment? (VALERIO, MMR)     Have you received antibody-containing blood transfusions or IVIG in the past 11 months (recommended interval is dependent on product)? (MMR, VALERIO)     Have you taken antiviral drugs (acyclovir, famciclovir, valacyclovir for VALERIO) in the last 24 or 48 hours, respectively?     "  Are you pregnant or planning to become pregnant within 1 month? (VALERIO, MMR, HPV, IPV, MenB, Abrexvy; For Hep B- refer to Engerix-B; For RSV - Abrysvo is indicated for 32-36 weeks of pregnancy from September to January)     For infants, have you ever been told your child has had intussusception or a medical emergency involving obstruction of the intestine (Rotavirus)? If not for an infant, can skip this question.         *Ordering Physicians/APC should be consulted if \"yes\" is checked by the patient or guardian above.  I have received, read, and understand the Vaccine Information Statement (VIS) for each vaccine ordered.  I have considered my or my child's health status as well as the health status of my close contacts.  I have taken the opportunity to discuss my vaccine questions with my or my child's health care provider.   I have requested that the ordered vaccine(s) be given to me or my child.  I understand the benefits and risks of the vaccines.  I understand that I should remain in the clinic for 15 minutes after receiving the vaccine(s).  _________________________________________________________  Signature of Patient or Parent/Legal Guardian ____________________  Date     "

## 2024-12-06 ENCOUNTER — TELEPHONE (OUTPATIENT)
Dept: INTERNAL MEDICINE | Facility: CLINIC | Age: 24
End: 2024-12-06
Payer: COMMERCIAL

## 2024-12-06 DIAGNOSIS — D72.829 LEUKOCYTOSIS, UNSPECIFIED TYPE: Primary | ICD-10-CM

## 2024-12-06 DIAGNOSIS — E61.1 IRON DEFICIENCY: ICD-10-CM

## 2024-12-06 PROBLEM — E66.3 OVERWEIGHT (BMI 25.0-29.9): Status: ACTIVE | Noted: 2024-12-06

## 2024-12-06 LAB
ALBUMIN SERPL-MCNC: 4.4 G/DL (ref 3.5–5.2)
ALBUMIN/GLOB SERPL: 1.4 G/DL
ALP SERPL-CCNC: 77 U/L (ref 39–117)
ALT SERPL W P-5'-P-CCNC: 15 U/L (ref 1–33)
ANION GAP SERPL CALCULATED.3IONS-SCNC: 10.6 MMOL/L (ref 5–15)
AST SERPL-CCNC: 19 U/L (ref 1–32)
BILIRUB SERPL-MCNC: 0.3 MG/DL (ref 0–1.2)
BUN SERPL-MCNC: 14 MG/DL (ref 6–20)
BUN/CREAT SERPL: 17.3 (ref 7–25)
CALCIUM SPEC-SCNC: 9.7 MG/DL (ref 8.6–10.5)
CHLORIDE SERPL-SCNC: 100 MMOL/L (ref 98–107)
CHOLEST SERPL-MCNC: 212 MG/DL (ref 0–200)
CO2 SERPL-SCNC: 27.4 MMOL/L (ref 22–29)
CREAT SERPL-MCNC: 0.81 MG/DL (ref 0.57–1)
EGFRCR SERPLBLD CKD-EPI 2021: 103.5 ML/MIN/1.73
GLOBULIN UR ELPH-MCNC: 3.2 GM/DL
GLUCOSE SERPL-MCNC: 67 MG/DL (ref 65–99)
HDLC SERPL-MCNC: 88 MG/DL (ref 40–60)
IRON 24H UR-MRATE: 33 MCG/DL (ref 37–145)
IRON SATN MFR SERPL: 9 % (ref 20–50)
LDLC SERPL CALC-MCNC: 111 MG/DL (ref 0–100)
LDLC/HDLC SERPL: 1.25 {RATIO}
POTASSIUM SERPL-SCNC: 4.4 MMOL/L (ref 3.5–5.2)
PROT SERPL-MCNC: 7.6 G/DL (ref 6–8.5)
SODIUM SERPL-SCNC: 138 MMOL/L (ref 136–145)
TIBC SERPL-MCNC: 367 MCG/DL (ref 298–536)
TRANSFERRIN SERPL-MCNC: 246 MG/DL (ref 200–360)
TRIGL SERPL-MCNC: 71 MG/DL (ref 0–150)
TSH SERPL DL<=0.05 MIU/L-ACNC: 0.67 UIU/ML (ref 0.27–4.2)
VLDLC SERPL-MCNC: 13 MG/DL (ref 5–40)

## 2024-12-06 RX ORDER — FERROUS SULFATE 325(65) MG
325 TABLET ORAL
Qty: 90 TABLET | Refills: 1 | Status: SHIPPED | OUTPATIENT
Start: 2024-12-06

## 2024-12-06 RX ORDER — BUSPIRONE HYDROCHLORIDE 7.5 MG/1
7.5 TABLET ORAL 3 TIMES DAILY PRN
Start: 2024-12-06

## 2024-12-06 NOTE — TELEPHONE ENCOUNTER
----- Message from Padmini Hernández sent at 12/6/2024  2:30 PM EST -----  Iron low. Recommend daily supplement     White count persistently elevated. Has she ever had this worked up by hematology?

## 2024-12-06 NOTE — TELEPHONE ENCOUNTER
Spoke with Patient . Verified .   Made aware of results.  Verbalized understanding.  Never worked up by hematoloy, agreeable to referral

## 2024-12-06 NOTE — ASSESSMENT & PLAN NOTE
Patient's (Body mass index is 25.24 kg/m².) indicates that they are overweight with health conditions that include none . Weight is unchanged. BMI is above average; BMI management plan is completed. We discussed low calorie, low carb based diet program, portion control, and increasing exercise.

## 2024-12-16 NOTE — PROGRESS NOTES
Chief Complaint/Reason for Referral:  Establish Care (Leukocytosis)    Padmini Hernández, MARQUIS*  Padmini Hernández APRN    Records Obtained:  Records of the patients history including those obtained from [facility] EMR were reviewed and summarized in detail.    Subjective    History of Present Illness    Janey Wakefield 24 y.o. presents to [facility] for Leukocytosis    History of Present Illness  The patient is a 24-year-old female who presents for evaluation of elevated white blood cell count.    She was referred to our facility due to an elevated white blood cell count. She reports no symptoms such as night sweats, extreme fatigue, unexplained weight loss, poor appetite, headache, chest pain, or unusual lumps or bumps. She has been experiencing mild cough but does not have any severe symptoms. She has noticed occasional minor bruising. She rates her fatigue level at 6 out of 10 on most days, a condition that has persisted for several years. She had cysts on her ovaries and had them removed.    She has been prescribed iron supplements, which she tolerates well, and requires a refill.    She has been experiencing chronic alternating diarrhea and constipation, which she plans to discuss with her primary care physician. She has a scheduled appointment with a colon surgeon on Thursday.    She attributes her current health issues to hormonal changes and acknowledges the need for mental health support. She has been experiencing depression.    MEDICATIONS  Current: ferrous gluconate     Significant past medical history for drug abuse including cocaine, LSD, marijuana, MDMA,psilocybin    Cancer-related family history is not on file.     Oncology/Hematology History    No history exists.     Review of Systems   Constitutional:  Positive for fatigue. Negative for activity change, appetite change, chills, fever, unexpected weight gain and unexpected weight loss.   HENT: Negative.      Eyes: Negative.    Respiratory:  Positive for cough. Negative for shortness of breath.    Cardiovascular: Negative.  Negative for chest pain and palpitations.   Gastrointestinal:  Positive for constipation and diarrhea. Negative for abdominal pain, nausea and vomiting.   Endocrine: Negative.    Genitourinary: Negative.    Musculoskeletal: Negative.  Negative for arthralgias and back pain.   Skin: Negative.    Allergic/Immunologic: Negative.    Neurological: Negative.  Negative for headache.   Hematological: Negative.  Negative for adenopathy. Does not bruise/bleed easily.   Psychiatric/Behavioral: Negative.     All other systems reviewed and are negative.    Current Outpatient Medications on File Prior to Visit   Medication Sig Dispense Refill    busPIRone (BUSPAR) 7.5 MG tablet Take 1 tablet by mouth 3 (Three) Times a Day As Needed (anxiety).      estradiol (MINIVELLE, VIVELLE-DOT) 0.05 MG/24HR patch APPLY 1 PATCH TO SKIN TWICE PER WEEK      ferrous sulfate 325 (65 FE) MG tablet Take 1 tablet by mouth Daily With Breakfast. 90 tablet 1    [DISCONTINUED] Levonorgestrel (Liletta, 52 MG,) 19.5 MCG/DAY intrauterine device  (Patient not taking: Reported on 12/17/2024)       No current facility-administered medications on file prior to visit.     Allergies   Allergen Reactions    Oxycodone-Acetaminophen Itching     Past Medical History:   Diagnosis Date    ADHD (attention deficit hyperactivity disorder) 6/2024    Adnexal cyst 03/31/2015    LEFT     Anxiety     Bladder problem 03/08/2023    Depression     History of medical problems     IIH (idiopathic intracranial hypertension) 10/10/2018    THE PATIENTS CLINICAL HISTORY AND EXAM ARE MOST CONSISTENT WITH IDIOPATHIC INTRACRANIAL HYPERTENSION. SHE STARTED HER FIRST DOSE OF DIAMOX TODAY. I WILL PURSUE A CT HEAD. I WILL PURSUE AN UGENT LUMBAR PUNCTURE. I WILL CHECK LABS. I WILL PURSUE AN MRV. I DID DISCUSS PURSUING A LUMBAR PUNCTURE TODAY BUT THE PATIENT DEFFERED. WE WILL  ATTEMPT TO  ARRANGE THIS TOMORROW. I INSTRUCTED THE PATIENT TO SEE     Irritable bowel syndrome     Mass of uterine adnexa 03/31/2015    Post lumbar puncture headache 12/10/2016    Renal calculus 03/08/2023    Ureteral calculus 03/31/2015    RIGHT     Past Surgical History:   Procedure Laterality Date    DIAGNOSTIC LAPAROSCOPY, SALPINGO OOPHORECTOMY LAPAROSCOPIC Right 9/27/2024    Procedure: DIAGNOSTIC LAPAROSCOPY, RIGHT OVARIAN CYSTECTOMY, EVACUATION OF HEMOPERITONEUM;  Surgeon: Rachelle Marroquin MD;  Location: [facility] MAIN OR;  Service: Gynecology;  Laterality: Right;    DIAGNOSTIC LAPAROSCOPY, SALPINGO OOPHORECTOMY LAPAROSCOPIC Right 10/19/2024    Procedure: RIGHT OOPHORECTOMY LAPAROSCOPIC AND EVACTION OF ENDOPERITONEUM;  Surgeon: Rachelle Marroquin MD;  Location: [facility] MAIN OR;  Service: Gynecology;  Laterality: Right;    HX OVARIAN CYSTECTOMY  2015    LUMBAR PUNCTURE      TONSILLECTOMY       Social History     Socioeconomic History    Marital status: Single   Tobacco Use    Smoking status: Never     Passive exposure: Yes    Smokeless tobacco: Never   Vaping Use    Vaping status: Every Day   Substance and Sexual Activity    Alcohol use: Yes     Alcohol/week: 5.0 standard drinks of alcohol     Types: 5 Cans of beer per week     Comment: occ    Drug use: Not Currently     Frequency: 7.0 times per week     Types: Cocaine(coke), LSD, Marijuana, MDMA (ecstacy), Psilocybin    Sexual activity: Yes     Birth control/protection: I.U.D., Post-menopausal, None     Comment: Ovaries removed     Family History   Problem Relation Age of Onset    Diabetes Mother     Miscarriages / Stillbirths Mother     Ulcerative colitis Father     Crohn's disease Father     Arthritis Father     Anxiety disorder Father     Depression Father     Urolithiasis Other      Immunization History   Administered Date(s) Administered    DTaP 1999, 1999, 01/31/2000, 01/12/2001, 07/14/2001    Flu Vaccine Quad PF >36MO 10/23/2015,  "12/05/2016    Flu Vaccine Split Quad 10/04/2004    FluMist 2-49yrs (Nasal) 10/01/2009, 10/21/2010    Fluzone (or Fluarix & Flulaval for VFC) >6mos 11/25/2014, 12/05/2016, 11/03/2017, 01/19/2023    Fluzone Quad >6mos (Multi-dose) 10/23/2015, 09/24/2018    HPV Quadrivalent 02/17/2014, 07/03/2014, 11/25/2014    Hep A, 2 Dose 02/17/2014, 04/22/2016    Hep B, Adolescent or Pediatric 1999, 01/31/2000, 01/12/2001    Hib (PRP-OMP) 1999, 1999, 01/31/2000, 10/23/2000    IPV 1999, 1999, 01/12/2001, 07/14/2001    Influenza TIV (IM) 01/18/2013    MMR 07/17/2000, 07/14/2001    Meningococcal MCV4P (Menactra) 04/22/2016    Meningococcal Polysaccharide 07/09/2010    PEDS-Pneumococcal Conjugate (PCV7) 06/23/2000, 07/17/2000    Pneumococcal Conjugate 20-Valent (PCV20) 12/05/2024    Tdap 07/09/2010, 12/05/2024    Varicella 07/17/2000, 07/09/2010     Tobacco Use: Medium Risk (12/17/2024)    Patient History     Smoking Tobacco Use: Never     Smokeless Tobacco Use: Never     Passive Exposure: Yes     Objective     Vitals:    12/17/24 1307   BP: 117/70   Pulse: 99   Resp: 16   Temp: 98.7 °F (37.1 °C)   TempSrc: Temporal   SpO2: 99%   Weight: 77.9 kg (171 lb 12.8 oz)   Height: 157.5 cm (62\")   PainSc: 0-No pain      Physical Exam  Vitals and nursing note reviewed.   Constitutional:       General: She is not in acute distress.     Appearance: Normal appearance. She is not ill-appearing.   HENT:      Head: Normocephalic.   Eyes:      Conjunctiva/sclera: Conjunctivae normal.   Cardiovascular:      Rate and Rhythm: Normal rate and regular rhythm.      Heart sounds: Normal heart sounds.   Pulmonary:      Effort: Pulmonary effort is normal.      Breath sounds: Normal breath sounds.   Abdominal:      Palpations: Abdomen is soft.   Lymphadenopathy:      Cervical: No cervical adenopathy.      Right cervical: No superficial cervical adenopathy.     Left cervical: No superficial cervical adenopathy.      Upper Body:      " Right upper body: No axillary adenopathy.      Left upper body: No axillary adenopathy.   Skin:     Coloration: Skin is not jaundiced.   Neurological:      Mental Status: She is alert.   Psychiatric:         Mood and Affect: Mood normal.         Behavior: Behavior normal. Behavior is cooperative.         Thought Content: Thought content normal.         Judgment: Judgment normal.       Wt Readings from Last 3 Encounters:   12/17/24 77.9 kg (171 lb 12.8 oz)   12/05/24 62.6 kg (138 lb)   10/18/24 61.9 kg (136 lb 6.4 oz)      Body mass index is 31.42 kg/m².    ECOG score: 0         ECOG: (0) Fully Active - Able to Carry On All Pre-disease Performance Without Restriction  Fall Risk Assessment was completed, and patient is at low risk for falls.  PHQ-9 Total Score:         The patient is  experiencing fatigue. Fatigue score: 6    PT/OT Functional Screening:   Speech Functional Screening:   Rehab to be ordered:     Vitals:    12/17/24 1307   PainSc: 0-No pain         Janey Wakefield reports a pain score of 0.       PHQ-2 Depression Screening  Little interest or pleasure in doing things? Not at all   Feeling down, depressed, or hopeless? Several days   PHQ-2 Total Score 1     Result Review :  The following data was reviewed by: Alexis Feliciano PA-C on 12/17/2024:    RED BLOOD CELLs:  Lab Results   Component Value Date    RBC 4.49 12/05/2024    HGB 14.6 12/05/2024    HCT 42.8 12/05/2024    MCV 95.3 12/05/2024     12/05/2024      WHITE BLOOD CELLs:  Lab Results   Component Value Date    WBC 13.89 (H) 12/05/2024    NEUTROABS 11.19 (H) 12/05/2024    LYMPHSABS 1.59 12/05/2024    EOSABS 0.05 12/05/2024    BASOSABS 0.06 12/05/2024     RBC EVALUATION (MICROCYTOSIS/NORMOCYTOSIS):  Lab Results   Component Value Date    MCV 95.3 12/05/2024    IRON 33 (L) 12/05/2024    FERRITIN 227.00 (H) 10/19/2024    LABIRON 9 (L) 12/05/2024    TIBC 367 12/05/2024    TRANSFERRIN 246 12/05/2024     HEMOLYSIS EVALUATION:  Lab Results   Component  Value Date    MCV 95.3 12/05/2024     MACROCYTOSIS EVALUATION:  Lab Results   Component Value Date    MCV 95.3 12/05/2024     RENAL FUNCTION TESTs:  Lab Results   Component Value Date    EGFR 103.5 12/05/2024    BUN 14 12/05/2024     LIVER FUNCTION TESTs:  Lab Results   Component Value Date    ALT 15 12/05/2024    AST 19 12/05/2024    BILITOT 0.3 12/05/2024    ALKPHOS 77 12/05/2024    PROTEINTOT 7.6 12/05/2024    ALBUMIN 4.4 12/05/2024     GLUCOSE EVALUATION:  Lab Results   Component Value Date    GLUCOSE 67 12/05/2024     SERUM CHEMISTRIES:  Lab Results   Component Value Date     12/05/2024    K 4.4 12/05/2024     12/05/2024    CO2 27.4 12/05/2024    CALCIUM 9.7 12/05/2024     URINALYSIS:  Lab Results   Component Value Date    RBCUR Small (A) 07/01/2024    LEUKOCYTESUR Negative 10/18/2024    BILIRUBINUR Negative 10/18/2024    PHUR 5.5 10/18/2024    SPECGRAVUR >1.030 (H) 10/18/2024     THYROID FUNCTION TESTs:  TSH   Date Value Ref Range Status   12/05/2024 0.665 0.270 - 4.200 uIU/mL Final     CT Abdomen Pelvis With Contrast  Result Date: 10/18/2024  There has been 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 There is an approximately 10 cm pelvic hematoma, with abdominal and pelvic hemoperitoneum. The overall appearance is noted to be very similar to the comparison (preoperative) study, but is presumed to be recurrent as there would likely have been liquefaction of the pelvic hematoma and resorption of some of the hemoperitoneum over the 3 weeks that have passed since the procedure Electronically Signed: Allen Daley  10/18/2024 12:24 PM EDT  Workstation ID: [id number]    CT Abdomen Pelvis With Contrast  Addendum Date: 9/27/2024    ADDENDUM #1 ADDENDUM: The hemorrhagic/soft tissue density within the pelvis measures up to 12 x 10 x 9 cm. It is unclear whether this is hemorrhagic clot as mentioned above or combination of clot and soft tissue from the right adnexa. Ultrasound would be useful for further  characterization if the patient is hemodynamically stable Electronically Signed: Shaq Hatfield MD  9/27/2024 12:17 PM EDT  Workstation ID: [id number] ORIGINAL REPORT: CT ABDOMEN PELVIS W CONTRAST Date of Exam: 9/27/2024 11:33 AM EDT Indication: abdominal pain. Comparison: 3/30/2018 Technique: Axial CT images were obtained of the abdomen and pelvis after the uneventful intravenous administration of iodinated contrast. Reconstructed coronal and sagittal images were also obtained. Automated exposure control and iterative construction methods were used. FINDINGS: Abdomen/Pelvis: Lower Chest: Limited image of the lower chest is grossly unremarkable in appearance. No free air is noted below the diaphragm. There is a moderate to large amount of increased density within the abdomen and pelvis. This is more prominent in density within the pelvis Organs: The liver, gallbladder, pancreas, spleen, kidneys and adrenal glands appear unremarkable. GI/Bowel: The stomach and small bowel demonstrate no definite acute abnormality. Colon demonstrates diverticulosis. Evaluation of the colon is somewhat limited by incomplete distention and surrounding fluid. Appendix is unable to be visualized Pelvis: High density fluid surrounds an abnormal lesion within the right adnexa containing fat soft tissue and calcification. This lesion measures at least 3.3 x 4.6 cm and may be larger. Density of material within the pelvis is not simple fluid density with some more focal areas surrounding the adnexa which could represent increased soft tissue or hemorrhagic clot. The uterus has an IUD in place. Left ovary not well seen. The bladder is decompressed Peritoneum/Retroperitoneum: The aorta is normal in caliber. There is no suspicious retroperitoneal adenopathy Bones/Soft Tissues: No destructive bone lesion. IMPRESSION: 1. Moderate to large amount of high density fluid within the abdomen and pelvis compatible with hemoperitoneum 2. 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 This measures at least 3.6 x 4.6 cm in size. Increased density surrounding this may represent hemorrhagic clot or a component of soft tissue. 3. There is an IUD device in place within the uterus which appears to be grossly unremarkable 4. More focal dense material within the pelvis may represent focal hemorrhagic clot and/or additional soft tissue associated with the right ovary. Per ordering provider patient has a negative pregnancy test. Findings may represent rupture of a hemorrhagic cyst. Torsion of abnormal right adnexa is not excluded. Consultation with OB/GYN for further management is recommended. Findings were discussed with Dr. Ayon at the time of interpretation. Electronically Signed: Shaq Hatfield MD  9/27/2024 12:06 PM EDT  Workstation ID: [id number]    Result Date: 9/27/2024  1. Moderate to large amount of high density fluid within the abdomen and pelvis compatible with hemoperitoneum 2. 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 This measures at least 3.6 x 4.6 cm in size. Increased density surrounding this may represent hemorrhagic clot or a component of soft tissue. 3. There is an IUD device in place within the uterus which appears to be grossly unremarkable 4. More focal dense material within the pelvis may represent focal hemorrhagic clot and/or additional soft tissue associated with the right ovary. Per ordering provider patient has a negative pregnancy test. Findings may represent rupture of a hemorrhagic cyst. Torsion of abnormal right adnexa is not excluded. Consultation with OB/GYN for further management is recommended. Findings were discussed with Dr. Ayon at the time of interpretation. Electronically Signed: Shaq Hatfield MD   9/27/2024 12:06 PM EDT  Workstation ID: [id number]       Assessment and Plan:  Diagnoses and all orders for this visit:    1. Leukocytosis, unspecified type (Primary)  -     CBC Auto Differential  -     Lactate Dehydrogenase  -     Comprehensive Metabolic Panel  -     Peripheral Blood Smear  -     HIV-1 / O / 2 Ag / Antibody  -     Uric Acid  -     Hepatitis Panel, Acute  -     Sedimentation Rate  -     Flow Cytometry, Blood  -     Vitamin B12 & Folate    2. Neutrophilia    3. Monocytosis    4. Elevated ferritin level  -     CBC Auto Differential  -     Lactate Dehydrogenase  -     Comprehensive Metabolic Panel  -     HIV-1 / O / 2 Ag / Antibody  -     Hepatitis Panel, Acute  -     Sedimentation Rate  -     Hemochromatosis Mutation  -     Ferritin    5. Iron deficiency  -     ferrous gluconate (FERGON) 324 MG tablet; Take 1 tablet by mouth Daily With Breakfast.  Dispense: 90 tablet; Refill: 1  -     Iron Profile  -     Ferritin    6. Vaping nicotine dependence, non-tobacco product  Comments:  D/C use    7. History of mixed drug abuse  Comments:  In remission      Assessment & Plan  Leukocytosis.  Her white blood cell count has been elevated, with a peak of 17,000 in October 2024 and a recent count of 13,890 on 12/05/2024. The elevation is primarily due to an increase in neutrophils, suggesting a bacterial infection or inflammation. Monocytes are slightly elevated at 0.94 (normal is 0.9). She reports no drenching night sweats, extreme fatigue, unexplained weight loss, poor appetite, headache, chest pain, or unusual lumps or bumps. She has a mild cough and chronic alternating diarrhea and constipation. A flow cytometry test will be conducted to analyze the characteristics of her white blood cells. Blood work will be drawn today to monitor her condition.    Iron deficiency anemia.  Her iron levels are low at 33 (normal is ), and her iron saturation has decreased from 27 to 9. This is likely secondary to significant  blood loss. Her hemoglobin has improved from 8.8 to 14.6. A prescription for ferrous gluconate, 90 tablets with a refill, has been sent to [facility]. She is advised to take the supplement daily or every other day with a small amount of orange juice or fruit juice containing vitamin C to enhance absorption. Blood work will be drawn today to monitor her iron levels.    Chronic alternating diarrhea and constipation.  She reports chronic alternating diarrhea and constipation. She is advised to discuss this with her primary care provider and consider seeing a gastroenterologist for further evaluation.    Depression.  She reports mental health struggles, likely exacerbated by hormonal changes and recent health issues. She is encouraged to speak with a therapist or her primary care provider, Dr. Wallace, about her mental health. Telehealth visits are suggested as a convenient option.    PROCEDURE  The patient had cysts on her ovaries removed.      -Encouraged patient to remain up to date on all recommended preventative medicine services specific for their sex and age.  Some examples include:  Diabetes screening, Hypertensive screening, Immunizations, Lipid screenings (cholesterol), Depression screenings, Colorectal cancer screening, HIV screening, Cervical cancer screening, Breast cancer screening, Osteoporosis screening, Alcohol screening, Dental screening, Tobacco Use screening and Dietary screening    Patient Follow Up: 2 months with MD SANTIAGO spent 45 minutes caring for Janey on this date of service. This time includes time spent by me in the following activities:preparing for the visit, reviewing tests, obtaining and/or reviewing a separately obtained history, performing a medically appropriate examination and/or evaluation , counseling and educating the patient/family/caregiver, ordering medications, tests, or procedures, documenting information in the medical record, and independently interpreting results and  communicating that information with the patient/family/caregiver    Patient was given instructions and counseling regarding her condition or for health maintenance advice. Please see specific information pulled into the AVS if appropriate.     Patient or patient representative verbalized consent for the use of Ambient Listening during the visit with  Alexis Feliciano PA-C for chart documentation. 12/17/2024  13:25 EST

## 2024-12-17 ENCOUNTER — CONSULT (OUTPATIENT)
Dept: ONCOLOGY | Facility: HOSPITAL | Age: 24
End: 2024-12-17
Payer: COMMERCIAL

## 2024-12-17 ENCOUNTER — LAB (OUTPATIENT)
Dept: ONCOLOGY | Facility: HOSPITAL | Age: 24
End: 2024-12-17
Payer: COMMERCIAL

## 2024-12-17 VITALS
TEMPERATURE: 98.7 F | RESPIRATION RATE: 16 BRPM | SYSTOLIC BLOOD PRESSURE: 117 MMHG | BODY MASS INDEX: 31.62 KG/M2 | WEIGHT: 171.8 LBS | OXYGEN SATURATION: 99 % | DIASTOLIC BLOOD PRESSURE: 70 MMHG | HEART RATE: 99 BPM | HEIGHT: 62 IN

## 2024-12-17 DIAGNOSIS — F19.11 HISTORY OF MIXED DRUG ABUSE: ICD-10-CM

## 2024-12-17 DIAGNOSIS — F17.200 VAPING NICOTINE DEPENDENCE, NON-TOBACCO PRODUCT: ICD-10-CM

## 2024-12-17 DIAGNOSIS — R79.89 ELEVATED FERRITIN LEVEL: ICD-10-CM

## 2024-12-17 DIAGNOSIS — D72.821 MONOCYTOSIS: ICD-10-CM

## 2024-12-17 DIAGNOSIS — D72.829 LEUKOCYTOSIS, UNSPECIFIED TYPE: Primary | ICD-10-CM

## 2024-12-17 DIAGNOSIS — E61.1 IRON DEFICIENCY: ICD-10-CM

## 2024-12-17 DIAGNOSIS — D64.9 ANEMIA, UNSPECIFIED TYPE: Primary | ICD-10-CM

## 2024-12-17 DIAGNOSIS — D72.828 NEUTROPHILIA: ICD-10-CM

## 2024-12-17 LAB
ALBUMIN SERPL-MCNC: 4.3 G/DL (ref 3.5–5.2)
ALBUMIN/GLOB SERPL: 1.4 G/DL
ALP SERPL-CCNC: 76 U/L (ref 39–117)
ALT SERPL W P-5'-P-CCNC: 15 U/L (ref 1–33)
ANION GAP SERPL CALCULATED.3IONS-SCNC: 8.4 MMOL/L (ref 5–15)
AST SERPL-CCNC: 15 U/L (ref 1–32)
BASOPHILS # BLD AUTO: 0.09 10*3/MM3 (ref 0–0.2)
BASOPHILS # BLD MANUAL: 0.22 10*3/MM3 (ref 0–0.2)
BASOPHILS NFR BLD AUTO: 0.8 % (ref 0–1.5)
BASOPHILS NFR BLD MANUAL: 2 % (ref 0–1.5)
BILIRUB SERPL-MCNC: 0.3 MG/DL (ref 0–1.2)
BUN SERPL-MCNC: 10 MG/DL (ref 6–20)
BUN/CREAT SERPL: 16.7 (ref 7–25)
CALCIUM SPEC-SCNC: 9.3 MG/DL (ref 8.6–10.5)
CHLORIDE SERPL-SCNC: 104 MMOL/L (ref 98–107)
CO2 SERPL-SCNC: 26.6 MMOL/L (ref 22–29)
CREAT SERPL-MCNC: 0.6 MG/DL (ref 0.57–1)
DEPRECATED RDW RBC AUTO: 41.1 FL (ref 37–54)
EGFRCR SERPLBLD CKD-EPI 2021: 127.9 ML/MIN/1.73
EOSINOPHIL # BLD AUTO: 0.41 10*3/MM3 (ref 0–0.4)
EOSINOPHIL # BLD MANUAL: 0.11 10*3/MM3 (ref 0–0.4)
EOSINOPHIL NFR BLD AUTO: 3.7 % (ref 0.3–6.2)
EOSINOPHIL NFR BLD MANUAL: 1 % (ref 0.3–6.2)
ERYTHROCYTE [DISTWIDTH] IN BLOOD BY AUTOMATED COUNT: 11.9 % (ref 12.3–15.4)
ERYTHROCYTE [SEDIMENTATION RATE] IN BLOOD: 3 MM/HR (ref 0–20)
FERRITIN SERPL-MCNC: 45.84 NG/ML (ref 13–150)
FOLATE SERPL-MCNC: 12.5 NG/ML (ref 4.78–24.2)
GLOBULIN UR ELPH-MCNC: 3 GM/DL
GLUCOSE SERPL-MCNC: 82 MG/DL (ref 65–99)
HAV IGM SERPL QL IA: NORMAL
HBV CORE IGM SERPL QL IA: NORMAL
HBV SURFACE AG SERPL QL IA: NORMAL
HCT VFR BLD AUTO: 42.6 % (ref 34–46.6)
HCV AB SER QL: NORMAL
HGB BLD-MCNC: 14 G/DL (ref 12–15.9)
HIV 1+2 AB+HIV1P24 AG SERPLBLD IA.RAPID: NORMAL
HIV 1+2 AB+HIV1P24 AG SERPLBLD IA.RAPID: NORMAL
IMM GRANULOCYTES # BLD AUTO: 0.03 10*3/MM3 (ref 0–0.05)
IMM GRANULOCYTES NFR BLD AUTO: 0.3 % (ref 0–0.5)
IRON 24H UR-MRATE: 95 MCG/DL (ref 37–145)
IRON SATN MFR SERPL: 28 % (ref 20–50)
LDH SERPL-CCNC: 175 U/L (ref 135–214)
LYMPHOCYTES # BLD AUTO: 1.88 10*3/MM3 (ref 0.7–3.1)
LYMPHOCYTES # BLD MANUAL: 1.97 10*3/MM3 (ref 0.7–3.1)
LYMPHOCYTES NFR BLD AUTO: 17.1 % (ref 19.6–45.3)
LYMPHOCYTES NFR BLD MANUAL: 4 % (ref 5–12)
MCH RBC QN AUTO: 30.8 PG (ref 26.6–33)
MCHC RBC AUTO-ENTMCNC: 32.9 G/DL (ref 31.5–35.7)
MCV RBC AUTO: 93.6 FL (ref 79–97)
MONOCYTES # BLD AUTO: 0.88 10*3/MM3 (ref 0.1–0.9)
MONOCYTES # BLD: 0.44 10*3/MM3 (ref 0.1–0.9)
MONOCYTES NFR BLD AUTO: 8 % (ref 5–12)
NEUTROPHILS # BLD AUTO: 8.23 10*3/MM3 (ref 1.7–7)
NEUTROPHILS NFR BLD AUTO: 7.68 10*3/MM3 (ref 1.7–7)
NEUTROPHILS NFR BLD AUTO: 70.1 % (ref 42.7–76)
NEUTROPHILS NFR BLD MANUAL: 75 % (ref 42.7–76)
NRBC BLD AUTO-RTO: 0 /100 WBC (ref 0–0.2)
PATHOLOGY REVIEW: YES
PLATELET # BLD AUTO: 320 10*3/MM3 (ref 140–450)
PMV BLD AUTO: 9.3 FL (ref 6–12)
POTASSIUM SERPL-SCNC: 4.1 MMOL/L (ref 3.5–5.2)
PROT SERPL-MCNC: 7.3 G/DL (ref 6–8.5)
RBC # BLD AUTO: 4.55 10*6/MM3 (ref 3.77–5.28)
RBC MORPH BLD: NORMAL
SMALL PLATELETS BLD QL SMEAR: ADEQUATE
SODIUM SERPL-SCNC: 139 MMOL/L (ref 136–145)
TIBC SERPL-MCNC: 344 MCG/DL (ref 298–536)
TRANSFERRIN SERPL-MCNC: 231 MG/DL (ref 200–360)
URATE SERPL-MCNC: 4.3 MG/DL (ref 2.4–5.7)
VARIANT LYMPHS NFR BLD MANUAL: 18 % (ref 19.6–45.3)
VIT B12 BLD-MCNC: 513 PG/ML (ref 211–946)
WBC MORPH BLD: NORMAL
WBC NRBC COR # BLD AUTO: 10.97 10*3/MM3 (ref 3.4–10.8)

## 2024-12-17 PROCEDURE — 85652 RBC SED RATE AUTOMATED: CPT | Performed by: PHYSICIAN ASSISTANT

## 2024-12-17 PROCEDURE — 80053 COMPREHEN METABOLIC PANEL: CPT | Performed by: PHYSICIAN ASSISTANT

## 2024-12-17 PROCEDURE — 82728 ASSAY OF FERRITIN: CPT | Performed by: PHYSICIAN ASSISTANT

## 2024-12-17 PROCEDURE — 82746 ASSAY OF FOLIC ACID SERUM: CPT | Performed by: PHYSICIAN ASSISTANT

## 2024-12-17 PROCEDURE — 83615 LACTATE (LD) (LDH) ENZYME: CPT | Performed by: PHYSICIAN ASSISTANT

## 2024-12-17 PROCEDURE — 81256 HFE GENE: CPT | Performed by: PHYSICIAN ASSISTANT

## 2024-12-17 PROCEDURE — 84466 ASSAY OF TRANSFERRIN: CPT | Performed by: PHYSICIAN ASSISTANT

## 2024-12-17 PROCEDURE — 84550 ASSAY OF BLOOD/URIC ACID: CPT | Performed by: PHYSICIAN ASSISTANT

## 2024-12-17 PROCEDURE — G0475 HIV COMBINATION ASSAY: HCPCS | Performed by: PHYSICIAN ASSISTANT

## 2024-12-17 PROCEDURE — 99204 OFFICE O/P NEW MOD 45 MIN: CPT | Performed by: PHYSICIAN ASSISTANT

## 2024-12-17 PROCEDURE — 83540 ASSAY OF IRON: CPT | Performed by: PHYSICIAN ASSISTANT

## 2024-12-17 PROCEDURE — 82607 VITAMIN B-12: CPT | Performed by: PHYSICIAN ASSISTANT

## 2024-12-17 PROCEDURE — 85025 COMPLETE CBC W/AUTO DIFF WBC: CPT | Performed by: PHYSICIAN ASSISTANT

## 2024-12-17 PROCEDURE — 80074 ACUTE HEPATITIS PANEL: CPT | Performed by: PHYSICIAN ASSISTANT

## 2024-12-17 PROCEDURE — 36415 COLL VENOUS BLD VENIPUNCTURE: CPT | Performed by: PHYSICIAN ASSISTANT

## 2024-12-17 PROCEDURE — G0463 HOSPITAL OUTPT CLINIC VISIT: HCPCS | Performed by: PHYSICIAN ASSISTANT

## 2024-12-17 RX ORDER — FERROUS GLUCONATE 324(38)MG
324 TABLET ORAL
Qty: 90 TABLET | Refills: 1 | Status: SHIPPED | OUTPATIENT
Start: 2024-12-17

## 2024-12-17 RX ORDER — ESTRADIOL 0.05 MG/D
PATCH, EXTENDED RELEASE TRANSDERMAL
COMMUNITY
Start: 2024-12-04

## 2024-12-19 ENCOUNTER — PREP FOR SURGERY (OUTPATIENT)
Dept: OTHER | Facility: HOSPITAL | Age: 24
End: 2024-12-19
Payer: COMMERCIAL

## 2024-12-19 ENCOUNTER — OFFICE VISIT (OUTPATIENT)
Dept: SURGERY | Facility: CLINIC | Age: 24
End: 2024-12-19
Payer: COMMERCIAL

## 2024-12-19 VITALS — BODY MASS INDEX: 31.1 KG/M2 | HEIGHT: 62 IN | WEIGHT: 169 LBS | RESPIRATION RATE: 18 BRPM

## 2024-12-19 DIAGNOSIS — K64.4 HEMORRHOIDAL SKIN TAG: Primary | ICD-10-CM

## 2024-12-19 DIAGNOSIS — K62.5 RECTAL BLEEDING: ICD-10-CM

## 2024-12-19 DIAGNOSIS — K59.09 OTHER CONSTIPATION: Primary | ICD-10-CM

## 2024-12-19 DIAGNOSIS — K62.5 RECTAL BLEEDING: Primary | ICD-10-CM

## 2024-12-19 LAB
CYTO UR: NORMAL
LAB AP CASE REPORT: NORMAL
LAB AP CLINICAL INFORMATION: NORMAL
Lab: NORMAL
PATH REPORT.FINAL DX SPEC: NORMAL
PATH REPORT.GROSS SPEC: NORMAL

## 2024-12-19 RX ORDER — SODIUM, POTASSIUM,MAG SULFATES 17.5-3.13G
SOLUTION, RECONSTITUTED, ORAL ORAL
Qty: 177 ML | Refills: 0 | Status: SHIPPED | OUTPATIENT
Start: 2024-12-19

## 2024-12-19 RX ORDER — SODIUM CHLORIDE 0.9 % (FLUSH) 0.9 %
10 SYRINGE (ML) INJECTION EVERY 12 HOURS SCHEDULED
OUTPATIENT
Start: 2024-12-19

## 2024-12-19 RX ORDER — SODIUM CHLORIDE, SODIUM LACTATE, POTASSIUM CHLORIDE, CALCIUM CHLORIDE 600; 310; 30; 20 MG/100ML; MG/100ML; MG/100ML; MG/100ML
50 INJECTION, SOLUTION INTRAVENOUS CONTINUOUS
OUTPATIENT
Start: 2024-12-19 | End: 2024-12-20

## 2024-12-19 RX ORDER — SODIUM CHLORIDE 0.9 % (FLUSH) 0.9 %
10 SYRINGE (ML) INJECTION AS NEEDED
OUTPATIENT
Start: 2024-12-19

## 2024-12-19 RX ORDER — SODIUM CHLORIDE 9 MG/ML
40 INJECTION, SOLUTION INTRAVENOUS AS NEEDED
OUTPATIENT
Start: 2024-12-19

## 2024-12-19 NOTE — LETTER
December 27, 2024     PAO Veloz  [address] [facility]  [address]  [place] [address]    Patient: Janey Wakefield   YOB: 2000   Date of Visit: 12/19/2024     Dear PAO Veloz:       Thank you for referring Janey Wakefield to me for evaluation. Below are the relevant portions of my assessment and plan of care.    If you have questions, please do not hesitate to call me. I look forward to following Janey along with you.         Sincerely,        Bam Stevens MD        CC: No Recipients    Bam Stevens MD  12/27/24 1054  Sign when Signing Visit  General Surgery/Colorectal Surgery Note    Patient Name:  Janey Wakefield  YOB: 2000  [id number]    Referring Provider: Padmini Hernández A*      Patient Care Team:  Padmini Hernández APRN as PCP - General (Internal Medicine & Pediatrics)  Daysi Koroma MD as Consulting Physician (Hospitalist)  Jaye Laguerre MD as Obstetrician (Obstetrics and Gynecology)    Chief complaint hemorrhoids  Subjective.     History of present illness:    History of a symptomatic hemorrhoid skin tag which makes it hard to clean and also some urticaria.  She will have swelling of the area.  No pain with bowel movements.  Occasional painless hematochezia.  No previous colonoscopy.  No family history of colorectal cancer.  Father with Crohn's disease.  No blood thinner use.  No fiber use.  She has a long history of constipation.  She sits on the toilet for long periods of time.      History:  Past Medical History:   Diagnosis Date   • ADHD (attention deficit hyperactivity disorder) 6/2024   • Adnexal cyst 03/31/2015    LEFT    • Anxiety    • Bladder problem 03/08/2023   • Depression    • History of medical problems    • IIH (idiopathic intracranial hypertension) 10/10/2018    THE PATIENTS CLINICAL HISTORY AND EXAM ARE MOST CONSISTENT WITH IDIOPATHIC INTRACRANIAL HYPERTENSION. SHE STARTED HER FIRST DOSE OF DIAMOX  TODAY. I WILL PURSUE A CT HEAD. I WILL PURSUE AN UGENT LUMBAR PUNCTURE. I WILL CHECK LABS. I WILL PURSUE AN MRV. I DID DISCUSS PURSUING A LUMBAR PUNCTURE TODAY BUT THE PATIENT DEFFERED. WE WILL ATTEMPT TO  ARRANGE THIS TOMORROW. I INSTRUCTED THE PATIENT TO SEE    • Irritable bowel syndrome    • Mass of uterine adnexa 03/31/2015   • Post lumbar puncture headache 12/10/2016   • Renal calculus 03/08/2023   • Ureteral calculus 03/31/2015    RIGHT       Past Surgical History:   Procedure Laterality Date   • DIAGNOSTIC LAPAROSCOPY, SALPINGO OOPHORECTOMY LAPAROSCOPIC Right 9/27/2024    Procedure: DIAGNOSTIC LAPAROSCOPY, RIGHT OVARIAN CYSTECTOMY, EVACUATION OF HEMOPERITONEUM;  Surgeon: Rachelle Marroquin MD;  Location: [facility];  Service: Gynecology;  Laterality: Right;   • DIAGNOSTIC LAPAROSCOPY, SALPINGO OOPHORECTOMY LAPAROSCOPIC Right 10/19/2024    Procedure: RIGHT OOPHORECTOMY LAPAROSCOPIC AND EVACTION OF ENDOPERITONEUM;  Surgeon: Rachelle Marroquin MD;  Location: [facility];  Service: Gynecology;  Laterality: Right;   • HX OVARIAN CYSTECTOMY  2015   • LUMBAR PUNCTURE     • TONSILLECTOMY         Family History   Problem Relation Age of Onset   • Diabetes Mother    • Miscarriages / Stillbirths Mother    • Ulcerative colitis Father    • Crohn's disease Father    • Arthritis Father    • Anxiety disorder Father    • Depression Father    • Urolithiasis Other        Social History     Tobacco Use   • Smoking status: Never     Passive exposure: Yes   • Smokeless tobacco: Never   Vaping Use   • Vaping status: Every Day   Substance Use Topics   • Alcohol use: Yes     Alcohol/week: 5.0 standard drinks of alcohol     Types: 5 Cans of beer per week     Comment: occ   • Drug use: Not Currently     Frequency: 7.0 times per week     Types: Cocaine(coke), LSD, Marijuana, MDMA (ecstacy), Psilocybin       Review of Systems  All systems were reviewed and negative except for:   Review of Systems   Constitutional: Negative  for chills, fever and unexpected weight loss.   HENT: Negative for congestion, nosebleeds and voice change.    Eyes: Negative for blurred vision, double vision and discharge.   Respiratory: Negative for apnea, chest tightness and shortness of breath.    Cardiovascular: Negative for chest pain and leg swelling.   Gastrointestinal:        See HPI   Endocrine: Negative for cold intolerance and heat intolerance.   Genitourinary: Negative for dysuria, hematuria and urgency.   Musculoskeletal: Negative for back pain, joint swelling and neck pain.   Skin: Negative for color change and dry skin.   Neurological: Negative for dizziness and confusion.   Hematological: Negative for adenopathy.   Psychiatric/Behavioral: Negative for agitation and behavioral problems.     MEDS:  Prior to Admission medications    Medication Sig Start Date End Date Taking? Authorizing Provider   busPIRone (BUSPAR) 7.5 MG tablet Take 1 tablet by mouth 3 (Three) Times a Day As Needed (anxiety). 12/6/24  Yes Padmini Hernández APRN   estradiol (MINIVELLE, VIVELLE-DOT) 0.05 MG/24HR patch APPLY 1 PATCH TO SKIN TWICE PER WEEK 12/4/24  Yes Provider, MD Johnnie   ferrous gluconate (FERGON) 324 MG tablet Take 1 tablet by mouth Daily With Breakfast. 12/17/24  Yes Alexis Feliciano PA-C   ferrous sulfate 325 (65 FE) MG tablet Take 1 tablet by mouth Daily With Breakfast. 12/6/24  Yes Padmini Hernández APRN   sodium-potassium-magnesium sulfates (SUPREP) 17.5-3.13-1.6 GM/177ML solution oral solution Take as directed 12/19/24   Bam Stevens MD        Allergies:  Oxycodone-acetaminophen    Objective    Vital Signs        Physical Exam:     General Appearance:    Alert, cooperative, in no acute distress   Head:    Normocephalic, without obvious abnormality, atraumatic   Eyes:          Conjunctivae and sclerae normal, no icterus,     Ears:    Ears appear intact with no abnormalities noted   Throat:   No oral lesions, no thrush, oral mucosa moist  "  Neck:   No adenopathy, supple, trachea midline, no thyromegaly   Back:     No kyphosis present, no scoliosis present, no skin lesions,      erythema or scars, no tenderness to percussion or                   palpation,   range of motion normal   Lungs:     Clear to auscultation,respirations regular, even and                  unlabored    Heart:    Regular rhythm and normal rate, normal S1 and S2, no            murmur, no gallop, no rub, no click   Chest Wall:    No abnormalities observed   Abdomen:     Normal bowel sounds, no masses, no organomegaly, soft        non-tender, non-distended, no guarding, no rebound                tenderness   Rectal:   Noninflamed hemorrhoid skin tag   Extremities:   Moves all extremities well, no edema, no cyanosis, no             redness   Pulses:   Pulses palpable and equal bilaterally   Skin:   No bleeding, bruising or rash   Lymph nodes:   No palpable adenopathy   Neurologic:   A/o x 4 with no deficits       Results Review:   {Results Review:83499::\"I reviewed the patient's new clinical results.\"    LABS/IMAGING:  Results for orders placed or performed in visit on 12/17/24   CBC Auto Differential    Collection Time: 12/17/24  2:36 PM    Specimen: Arm, Right; Blood   Result Value Ref Range    WBC 10.97 (H) 3.40 - 10.80 10*3/mm3    RBC 4.55 3.77 - 5.28 10*6/mm3    Hemoglobin 14.0 12.0 - 15.9 g/dL    Hematocrit 42.6 34.0 - 46.6 %    MCV 93.6 79.0 - 97.0 fL    MCH 30.8 26.6 - 33.0 pg    MCHC 32.9 31.5 - 35.7 g/dL    RDW 11.9 (L) 12.3 - 15.4 %    RDW-SD 41.1 37.0 - 54.0 fl    MPV 9.3 6.0 - 12.0 fL    Platelets 320 140 - 450 10*3/mm3    Neutrophil % 70.1 42.7 - 76.0 %    Lymphocyte % 17.1 (L) 19.6 - 45.3 %    Monocyte % 8.0 5.0 - 12.0 %    Eosinophil % 3.7 0.3 - 6.2 %    Basophil % 0.8 0.0 - 1.5 %    Immature Grans % 0.3 0.0 - 0.5 %    Neutrophils, Absolute 7.68 (H) 1.70 - 7.00 10*3/mm3    Lymphocytes, Absolute 1.88 0.70 - 3.10 10*3/mm3    Monocytes, Absolute 0.88 0.10 - 0.90 10*3/mm3 "    Eosinophils, Absolute 0.41 (H) 0.00 - 0.40 10*3/mm3    Basophils, Absolute 0.09 0.00 - 0.20 10*3/mm3    Immature Grans, Absolute 0.03 0.00 - 0.05 10*3/mm3    nRBC 0.0 0.0 - 0.2 /100 WBC   Lactate Dehydrogenase    Collection Time: 12/17/24  2:36 PM    Specimen: Arm, Right; Blood   Result Value Ref Range     135 - 214 U/L   Comprehensive Metabolic Panel    Collection Time: 12/17/24  2:36 PM    Specimen: Arm, Right; Blood   Result Value Ref Range    Glucose 82 65 - 99 mg/dL    BUN 10 6 - 20 mg/dL    Creatinine 0.60 0.57 - 1.00 mg/dL    Sodium 139 136 - 145 mmol/L    Potassium 4.1 3.5 - 5.2 mmol/L    Chloride 104 98 - 107 mmol/L    CO2 26.6 22.0 - 29.0 mmol/L    Calcium 9.3 8.6 - 10.5 mg/dL    Total Protein 7.3 6.0 - 8.5 g/dL    Albumin 4.3 3.5 - 5.2 g/dL    ALT (SGPT) 15 1 - 33 U/L    AST (SGOT) 15 1 - 32 U/L    Alkaline Phosphatase 76 39 - 117 U/L    Total Bilirubin 0.3 0.0 - 1.2 mg/dL    Globulin 3.0 gm/dL    A/G Ratio 1.4 g/dL    BUN/Creatinine Ratio 16.7 7.0 - 25.0    Anion Gap 8.4 5.0 - 15.0 mmol/L    eGFR 127.9 >60.0 mL/min/1.73   HIV-1 / O / 2 Ag / Antibody    Collection Time: 12/17/24  2:36 PM    Specimen: Arm, Right; Blood   Result Value Ref Range    HIV-1/ HIV-2 Ab Non-Reactive Non-Reactive    HIV-1 P24 Ag Screen Non-Reactive Non-Reactive   Uric Acid    Collection Time: 12/17/24  2:36 PM    Specimen: Arm, Right; Blood   Result Value Ref Range    Uric Acid 4.3 2.4 - 5.7 mg/dL   Hepatitis Panel, Acute    Collection Time: 12/17/24  2:36 PM    Specimen: Arm, Right; Blood   Result Value Ref Range    Hepatitis B Surface Ag Non-Reactive Non-Reactive    Hep A IgM Non-Reactive Non-Reactive    Hep B C IgM Non-Reactive Non-Reactive    Hepatitis C Ab Non-Reactive Non-Reactive   Sedimentation Rate    Collection Time: 12/17/24  2:36 PM    Specimen: Arm, Right; Blood   Result Value Ref Range    Sed Rate 3 0 - 20 mm/hr   Vitamin B12 & Folate    Collection Time: 12/17/24  2:36 PM    Specimen: Arm, Right; Blood    Result Value Ref Range    Folate 12.50 4.78 - 24.20 ng/mL    Vitamin B-12 513 211 - 946 pg/mL   Hemochromatosis Mutation    Collection Time: 12/17/24  2:36 PM    Specimen: Arm, Right; Blood   Result Value Ref Range    Hemochromatosis Gene Comment     REVIEWED BY Comment    Iron Profile    Collection Time: 12/17/24  2:36 PM    Specimen: Arm, Right; Blood   Result Value Ref Range    Iron 95 37 - 145 mcg/dL    Iron Saturation (TSAT) 28 20 - 50 %    Transferrin 231 200 - 360 mg/dL    TIBC 344 298 - 536 mcg/dL   Ferritin    Collection Time: 12/17/24  2:36 PM    Specimen: Arm, Right; Blood   Result Value Ref Range    Ferritin 45.84 13.00 - 150.00 ng/mL   Manual Differential    Collection Time: 12/17/24  2:36 PM    Specimen: Arm, Right; Blood   Result Value Ref Range    Neutrophil % 75.0 42.7 - 76.0 %    Lymphocyte % 18.0 (L) 19.6 - 45.3 %    Monocyte % 4.0 (L) 5.0 - 12.0 %    Eosinophil % 1.0 0.3 - 6.2 %    Basophil % 2.0 (H) 0.0 - 1.5 %    Neutrophils Absolute 8.23 (H) 1.70 - 7.00 10*3/mm3    Lymphocytes Absolute 1.97 0.70 - 3.10 10*3/mm3    Monocytes Absolute 0.44 0.10 - 0.90 10*3/mm3    Eosinophils Absolute 0.11 0.00 - 0.40 10*3/mm3    Basophils Absolute 0.22 (H) 0.00 - 0.20 10*3/mm3    RBC Morphology Normal Normal    WBC Morphology Normal Normal    Platelet Estimate Adequate Normal   Peripheral Blood Smear    Collection Time: 12/17/24  2:36 PM    Specimen: Arm, Right; Blood   Result Value Ref Range    Pathology Review Yes    Pathology Consultation    Collection Time: 12/17/24  2:36 PM    Specimen: Blood, Venous   Result Value Ref Range    Final Diagnosis       Peripheral blood (CBC and smear):   - WBC:  Leukocytosis, neutrophilia, and eosinophilia with neutrophil toxic change   - RBC:  Normocytic normochromic RBCs   - Platelets:  Normal platelet count and morphology    Comment:  The following test was performed at a reference laboratory on the peripheral blood. See separate reference laboratory report for additional  information.      Flow cytometry: No significant immunophenotypic abnormality detected.       Clinical Information       Leukocytosis, unspecified type      Gross Description       1. Blood, Venous.  One (1) Arechiga-stained peripheral blood smear is received for evaluation. The smear is accompanied by a CBC analysis report and marked as a physician/advanced practice clinician review.            Microscopic Description       Microscopic examination performed.      Case Report       Surgical Pathology Report                         Case: [id number]                                  Authorizing Provider:  Alexis Feliciano PA-C        Collected:           12/17/2024 02:36 PM          Ordering Location:     [facility]     Received:            12/18/2024 09:06 AM                                 GROUP HEMATOLOGY &                                                                                  ONCOLOGY                                                                     Pathologist:           Francesco Gan MD                                                            Specimen:    Blood, Venous, PBS                                                                        Flow Cytometry    Collection Time: 12/17/24  2:36 PM    Specimen: Arm, Right; Blood   Result Value Ref Range    Consult Global Result Comment^Text^TXT         Result Review: Labs  Result Review  Imaging  Med Tab  Media     Assessment & Plan    Symptomatic hemorrhoid skin tag  Hematochezia  Constipation    Discussion with patient.  For her constipation I recommended referral to gastroenterology for further evaluation and treatment.  She is agreeable.  Orders placed.  I instructed her to start over-the-counter fiber.  I instructed her to be on and off the toilet quickly.  For her hematochezia I recommended colonoscopy with rubber band ligation of her internal hemorrhoids.  Benefits and alternatives discussed.  Risk procedure including risk of  bleeding, perforation, missing a polyp, pain, infection discussed.  She agrees with the plan.  Orders placed.  I then recommended proceeding with excision of the hemorrhoid skin tag in the operating room.  Procedure and recovery discussed.  Benefits and alternatives discussed.  Risk procedure including risk of anesthesia, bleeding, infection, recurrence, pain discussed.  All questions answered.  She agrees with the plan.  Orders placed.  No bowel prep.           This document has been electronically signed by Bam Stevens MD  December 27, 2024 10:51 EST

## 2024-12-23 LAB
HFE GENE MUT ANL BLD/T: NORMAL
IMP & REVIEW OF LAB RESULTS: NORMAL

## 2024-12-27 NOTE — PROGRESS NOTES
General Surgery/Colorectal Surgery Note    Patient Name:  Janey Wakefield  YOB: 2000  [id number]    Referring Provider: Padmini Hernández A*      Patient Care Team:  Padmini Hernández APRN as PCP - General (Internal Medicine & Pediatrics)  Daysi Koroma MD as Consulting Physician (Hospitalist)  Jaye Laguerre MD as Obstetrician (Obstetrics and Gynecology)    Chief complaint hemorrhoids  Subjective .     History of present illness:    History of a symptomatic hemorrhoid skin tag which makes it hard to clean and also some urticaria.  She will have swelling of the area.  No pain with bowel movements.  Occasional painless hematochezia.  No previous colonoscopy.  No family history of colorectal cancer.  Father with Crohn's disease.  No blood thinner use.  No fiber use.  She has a long history of constipation.  She sits on the toilet for long periods of time.      History:  Past Medical History:   Diagnosis Date    ADHD (attention deficit hyperactivity disorder) 6/2024    Adnexal cyst 03/31/2015    LEFT     Anxiety     Bladder problem 03/08/2023    Depression     History of medical problems     IIH (idiopathic intracranial hypertension) 10/10/2018    THE PATIENTS CLINICAL HISTORY AND EXAM ARE MOST CONSISTENT WITH IDIOPATHIC INTRACRANIAL HYPERTENSION. SHE STARTED HER FIRST DOSE OF DIAMOX TODAY. I WILL PURSUE A CT HEAD. I WILL PURSUE AN UGENT LUMBAR PUNCTURE. I WILL CHECK LABS. I WILL PURSUE AN MRV. I DID DISCUSS PURSUING A LUMBAR PUNCTURE TODAY BUT THE PATIENT DEFFERED. WE WILL ATTEMPT TO  ARRANGE THIS TOMORROW. I INSTRUCTED THE PATIENT TO SEE     Irritable bowel syndrome     Mass of uterine adnexa 03/31/2015    Post lumbar puncture headache 12/10/2016    Renal calculus 03/08/2023    Ureteral calculus 03/31/2015    RIGHT       Past Surgical History:   Procedure Laterality Date    DIAGNOSTIC LAPAROSCOPY, SALPINGO OOPHORECTOMY LAPAROSCOPIC Right 9/27/2024    Procedure: DIAGNOSTIC LAPAROSCOPY,  RIGHT OVARIAN CYSTECTOMY, EVACUATION OF HEMOPERITONEUM;  Surgeon: Rachelle Marroquin MD;  Location: [facility] MAIN OR;  Service: Gynecology;  Laterality: Right;    DIAGNOSTIC LAPAROSCOPY, SALPINGO OOPHORECTOMY LAPAROSCOPIC Right 10/19/2024    Procedure: RIGHT OOPHORECTOMY LAPAROSCOPIC AND EVACTION OF ENDOPERITONEUM;  Surgeon: Rachelle Marroquin MD;  Location: [facility] MAIN OR;  Service: Gynecology;  Laterality: Right;    HX OVARIAN CYSTECTOMY  2015    LUMBAR PUNCTURE      TONSILLECTOMY         Family History   Problem Relation Age of Onset    Diabetes Mother     Miscarriages / Stillbirths Mother     Ulcerative colitis Father     Crohn's disease Father     Arthritis Father     Anxiety disorder Father     Depression Father     Urolithiasis Other        Social History     Tobacco Use    Smoking status: Never     Passive exposure: Yes    Smokeless tobacco: Never   Vaping Use    Vaping status: Every Day   Substance Use Topics    Alcohol use: Yes     Alcohol/week: 5.0 standard drinks of alcohol     Types: 5 Cans of beer per week     Comment: occ    Drug use: Not Currently     Frequency: 7.0 times per week     Types: Cocaine(coke), LSD, Marijuana, MDMA (ecstacy), Psilocybin       Review of Systems  All systems were reviewed and negative except for:   Review of Systems   Constitutional: Negative for chills, fever and unexpected weight loss.   HENT: Negative for congestion, nosebleeds and voice change.    Eyes: Negative for blurred vision, double vision and discharge.   Respiratory: Negative for apnea, chest tightness and shortness of breath.    Cardiovascular: Negative for chest pain and leg swelling.   Gastrointestinal:        See HPI   Endocrine: Negative for cold intolerance and heat intolerance.   Genitourinary: Negative for dysuria, hematuria and urgency.   Musculoskeletal: Negative for back pain, joint swelling and neck pain.   Skin: Negative for color change and dry skin.   Neurological: Negative for dizziness and  confusion.   Hematological: Negative for adenopathy.   Psychiatric/Behavioral: Negative for agitation and behavioral problems.     MEDS:  Prior to Admission medications    Medication Sig Start Date End Date Taking? Authorizing Provider   busPIRone (BUSPAR) 7.5 MG tablet Take 1 tablet by mouth 3 (Three) Times a Day As Needed (anxiety). 12/6/24  Yes Padmini Hernández APRN   estradiol (MINIVELLE, VIVELLE-DOT) 0.05 MG/24HR patch APPLY 1 PATCH TO SKIN TWICE PER WEEK 12/4/24  Yes ProviderJohnnie MD   ferrous gluconate (FERGON) 324 MG tablet Take 1 tablet by mouth Daily With Breakfast. 12/17/24  Yes Alexis Feliciano PA-C   ferrous sulfate 325 (65 FE) MG tablet Take 1 tablet by mouth Daily With Breakfast. 12/6/24  Yes Padmini Hernández APRN   sodium-potassium-magnesium sulfates (SUPREP) 17.5-3.13-1.6 GM/177ML solution oral solution Take as directed 12/19/24   Bam Stevens MD        Allergies:  Oxycodone-acetaminophen    Objective     Vital Signs        Physical Exam:     General Appearance:    Alert, cooperative, in no acute distress   Head:    Normocephalic, without obvious abnormality, atraumatic   Eyes:          Conjunctivae and sclerae normal, no icterus,     Ears:    Ears appear intact with no abnormalities noted   Throat:   No oral lesions, no thrush, oral mucosa moist   Neck:   No adenopathy, supple, trachea midline, no thyromegaly   Back:     No kyphosis present, no scoliosis present, no skin lesions,      erythema or scars, no tenderness to percussion or                   palpation,   range of motion normal   Lungs:     Clear to auscultation,respirations regular, even and                  unlabored    Heart:    Regular rhythm and normal rate, normal S1 and S2, no            murmur, no gallop, no rub, no click   Chest Wall:    No abnormalities observed   Abdomen:     Normal bowel sounds, no masses, no organomegaly, soft        non-tender, non-distended, no guarding, no rebound                 "tenderness   Rectal:   Noninflamed hemorrhoid skin tag   Extremities:   Moves all extremities well, no edema, no cyanosis, no             redness   Pulses:   Pulses palpable and equal bilaterally   Skin:   No bleeding, bruising or rash   Lymph nodes:   No palpable adenopathy   Neurologic:   A/o x 4 with no deficits       Results Review:   {Results Review:[id number]::\"I reviewed the patient's new clinical results.\"    LABS/IMAGING:  Results for orders placed or performed in visit on 12/17/24   CBC Auto Differential    Collection Time: 12/17/24  2:36 PM    Specimen: Arm, Right; Blood   Result Value Ref Range    WBC 10.97 (H) 3.40 - 10.80 10*3/mm3    RBC 4.55 3.77 - 5.28 10*6/mm3    Hemoglobin 14.0 12.0 - 15.9 g/dL    Hematocrit 42.6 34.0 - 46.6 %    MCV 93.6 79.0 - 97.0 fL    MCH 30.8 26.6 - 33.0 pg    MCHC 32.9 31.5 - 35.7 g/dL    RDW 11.9 (L) 12.3 - 15.4 %    RDW-SD 41.1 37.0 - 54.0 fl    MPV 9.3 6.0 - 12.0 fL    Platelets 320 140 - 450 10*3/mm3    Neutrophil % 70.1 42.7 - 76.0 %    Lymphocyte % 17.1 (L) 19.6 - 45.3 %    Monocyte % 8.0 5.0 - 12.0 %    Eosinophil % 3.7 0.3 - 6.2 %    Basophil % 0.8 0.0 - 1.5 %    Immature Grans % 0.3 0.0 - 0.5 %    Neutrophils, Absolute 7.68 (H) 1.70 - 7.00 10*3/mm3    Lymphocytes, Absolute 1.88 0.70 - 3.10 10*3/mm3    Monocytes, Absolute 0.88 0.10 - 0.90 10*3/mm3    Eosinophils, Absolute 0.41 (H) 0.00 - 0.40 10*3/mm3    Basophils, Absolute 0.09 0.00 - 0.20 10*3/mm3    Immature Grans, Absolute 0.03 0.00 - 0.05 10*3/mm3    nRBC 0.0 0.0 - 0.2 /100 WBC   Lactate Dehydrogenase    Collection Time: 12/17/24  2:36 PM    Specimen: Arm, Right; Blood   Result Value Ref Range     135 - 214 U/L   Comprehensive Metabolic Panel    Collection Time: 12/17/24  2:36 PM    Specimen: Arm, Right; Blood   Result Value Ref Range    Glucose 82 65 - 99 mg/dL    BUN 10 6 - 20 mg/dL    Creatinine 0.60 0.57 - 1.00 mg/dL    Sodium 139 136 - 145 mmol/L    Potassium 4.1 3.5 - 5.2 mmol/L    Chloride 104 98 - 107 " mmol/L    CO2 26.6 22.0 - 29.0 mmol/L    Calcium 9.3 8.6 - 10.5 mg/dL    Total Protein 7.3 6.0 - 8.5 g/dL    Albumin 4.3 3.5 - 5.2 g/dL    ALT (SGPT) 15 1 - 33 U/L    AST (SGOT) 15 1 - 32 U/L    Alkaline Phosphatase 76 39 - 117 U/L    Total Bilirubin 0.3 0.0 - 1.2 mg/dL    Globulin 3.0 gm/dL    A/G Ratio 1.4 g/dL    BUN/Creatinine Ratio 16.7 7.0 - 25.0    Anion Gap 8.4 5.0 - 15.0 mmol/L    eGFR 127.9 >60.0 mL/min/1.73   HIV-1 / O / 2 Ag / Antibody    Collection Time: 12/17/24  2:36 PM    Specimen: Arm, Right; Blood   Result Value Ref Range    HIV-1/ HIV-2 Ab Non-Reactive Non-Reactive    HIV-1 P24 Ag Screen Non-Reactive Non-Reactive   Uric Acid    Collection Time: 12/17/24  2:36 PM    Specimen: Arm, Right; Blood   Result Value Ref Range    Uric Acid 4.3 2.4 - 5.7 mg/dL   Hepatitis Panel, Acute    Collection Time: 12/17/24  2:36 PM    Specimen: Arm, Right; Blood   Result Value Ref Range    Hepatitis B Surface Ag Non-Reactive Non-Reactive    Hep A IgM Non-Reactive Non-Reactive    Hep B C IgM Non-Reactive Non-Reactive    Hepatitis C Ab Non-Reactive Non-Reactive   Sedimentation Rate    Collection Time: 12/17/24  2:36 PM    Specimen: Arm, Right; Blood   Result Value Ref Range    Sed Rate 3 0 - 20 mm/hr   Vitamin B12 & Folate    Collection Time: 12/17/24  2:36 PM    Specimen: Arm, Right; Blood   Result Value Ref Range    Folate 12.50 4.78 - 24.20 ng/mL    Vitamin B-12 513 211 - 946 pg/mL   Hemochromatosis Mutation    Collection Time: 12/17/24  2:36 PM    Specimen: Arm, Right; Blood   Result Value Ref Range    Hemochromatosis Gene Comment     REVIEWED BY Comment    Iron Profile    Collection Time: 12/17/24  2:36 PM    Specimen: Arm, Right; Blood   Result Value Ref Range    Iron 95 37 - 145 mcg/dL    Iron Saturation (TSAT) 28 20 - 50 %    Transferrin 231 200 - 360 mg/dL    TIBC 344 298 - 536 mcg/dL   Ferritin    Collection Time: 12/17/24  2:36 PM    Specimen: Arm, Right; Blood   Result Value Ref Range    Ferritin 45.84 13.00  - 150.00 ng/mL   Manual Differential    Collection Time: 12/17/24  2:36 PM    Specimen: Arm, Right; Blood   Result Value Ref Range    Neutrophil % 75.0 42.7 - 76.0 %    Lymphocyte % 18.0 (L) 19.6 - 45.3 %    Monocyte % 4.0 (L) 5.0 - 12.0 %    Eosinophil % 1.0 0.3 - 6.2 %    Basophil % 2.0 (H) 0.0 - 1.5 %    Neutrophils Absolute 8.23 (H) 1.70 - 7.00 10*3/mm3    Lymphocytes Absolute 1.97 0.70 - 3.10 10*3/mm3    Monocytes Absolute 0.44 0.10 - 0.90 10*3/mm3    Eosinophils Absolute 0.11 0.00 - 0.40 10*3/mm3    Basophils Absolute 0.22 (H) 0.00 - 0.20 10*3/mm3    RBC Morphology Normal Normal    WBC Morphology Normal Normal    Platelet Estimate Adequate Normal   Peripheral Blood Smear    Collection Time: 12/17/24  2:36 PM    Specimen: Arm, Right; Blood   Result Value Ref Range    Pathology Review Yes    Pathology Consultation    Collection Time: 12/17/24  2:36 PM    Specimen: Blood, Venous   Result Value Ref Range    Final Diagnosis       Peripheral blood (CBC and smear):   - WBC:  Leukocytosis, neutrophilia, and eosinophilia with neutrophil toxic change   - RBC:  Normocytic normochromic RBCs   - Platelets:  Normal platelet count and morphology    Comment:  The following test was performed at a reference laboratory on the peripheral blood. See separate reference laboratory report for additional information.      Flow cytometry: No significant immunophenotypic abnormality detected.       Clinical Information       Leukocytosis, unspecified type      Gross Description       1. Blood, Venous.  One (1) Arechiga-stained peripheral blood smear is received for evaluation. The smear is accompanied by a CBC analysis report and marked as a physician/advanced practice clinician review.            Microscopic Description       Microscopic examination performed.      Case Report       Surgical Pathology Report                         Case: [id number]                                  Authorizing Provider:  Alexis Feliciano PA-C         Collected:           12/17/2024 02:36 PM          Ordering Location:     [facility]     Received:            12/18/2024 09:06 AM                                 [facility] H[facility]ATOLOGY &                                                                                  ONCOLOGY                                                                     Pathologist:           Francesco Gan MD                                                            Specimen:    Blood, Venous, PBS                                                                        Flow Cytometry    Collection Time: 12/17/24  2:36 PM    Specimen: Arm, Right; Blood   Result Value Ref Range    Consult Global Result Comment^Text^TXT         Result Review : Labs  Result Review  Imaging  Med Tab  Media     Assessment & Plan     Symptomatic hemorrhoid skin tag  Hematochezia  Constipation    Discussion with patient.  For her constipation I recommended referral to gastroenterology for further evaluation and treatment.  She is agreeable.  Orders placed.  I instructed her to start over-the-counter fiber.  I instructed her to be on and off the toilet quickly.  For her hematochezia I recommended colonoscopy with rubber band ligation of her internal hemorrhoids.  Benefits and alternatives discussed.  Risk procedure including risk of bleeding, perforation, missing a polyp, pain, infection discussed.  She agrees with the plan.  Orders placed.  I then recommended proceeding with excision of the hemorrhoid skin tag in the operating room.  Procedure and recovery discussed.  Benefits and alternatives discussed.  Risk procedure including risk of anesthesia, bleeding, infection, recurrence, pain discussed.  All questions answered.  She agrees with the plan.  Orders placed.  No bowel prep.           This document has been electronically signed by Bam Stevens MD  December 27, 2024 10:51 EST

## 2024-12-27 NOTE — H&P (VIEW-ONLY)
General Surgery/Colorectal Surgery Note    Patient Name:  Janey Wakefield  YOB: 2000  [id number]    Referring Provider: Padmini Hernández A*      Patient Care Team:  Padmini Hernández APRN as PCP - General (Internal Medicine & Pediatrics)  Daysi Koroma MD as Consulting Physician (Hospitalist)  Jaye Laguerre MD as Obstetrician (Obstetrics and Gynecology)    Chief complaint hemorrhoids  Subjective .     History of present illness:    History of a symptomatic hemorrhoid skin tag which makes it hard to clean and also some urticaria.  She will have swelling of the area.  No pain with bowel movements.  Occasional painless hematochezia.  No previous colonoscopy.  No family history of colorectal cancer.  Father with Crohn's disease.  No blood thinner use.  No fiber use.  She has a long history of constipation.  She sits on the toilet for long periods of time.      History:  Past Medical History:   Diagnosis Date    ADHD (attention deficit hyperactivity disorder) 6/2024    Adnexal cyst 03/31/2015    LEFT     Anxiety     Bladder problem 03/08/2023    Depression     History of medical problems     IIH (idiopathic intracranial hypertension) 10/10/2018    THE PATIENTS CLINICAL HISTORY AND EXAM ARE MOST CONSISTENT WITH IDIOPATHIC INTRACRANIAL HYPERTENSION. SHE STARTED HER FIRST DOSE OF DIAMOX TODAY. I WILL PURSUE A CT HEAD. I WILL PURSUE AN UGENT LUMBAR PUNCTURE. I WILL CHECK LABS. I WILL PURSUE AN MRV. I DID DISCUSS PURSUING A LUMBAR PUNCTURE TODAY BUT THE PATIENT DEFFERED. WE WILL ATTEMPT TO  ARRANGE THIS TOMORROW. I INSTRUCTED THE PATIENT TO SEE     Irritable bowel syndrome     Mass of uterine adnexa 03/31/2015    Post lumbar puncture headache 12/10/2016    Renal calculus 03/08/2023    Ureteral calculus 03/31/2015    RIGHT       Past Surgical History:   Procedure Laterality Date    DIAGNOSTIC LAPAROSCOPY, SALPINGO OOPHORECTOMY LAPAROSCOPIC Right 9/27/2024    Procedure: DIAGNOSTIC LAPAROSCOPY,  RIGHT OVARIAN CYSTECTOMY, EVACUATION OF HEMOPERITONEUM;  Surgeon: Rachelle Marroquin MD;  Location: [facility] MAIN OR;  Service: Gynecology;  Laterality: Right;    DIAGNOSTIC LAPAROSCOPY, SALPINGO OOPHORECTOMY LAPAROSCOPIC Right 10/19/2024    Procedure: RIGHT OOPHORECTOMY LAPAROSCOPIC AND EVACTION OF ENDOPERITONEUM;  Surgeon: Rachelle Marroquin MD;  Location: [facility] MAIN OR;  Service: Gynecology;  Laterality: Right;    HX OVARIAN CYSTECTOMY  2015    LUMBAR PUNCTURE      TONSILLECTOMY         Family History   Problem Relation Age of Onset    Diabetes Mother     Miscarriages / Stillbirths Mother     Ulcerative colitis Father     Crohn's disease Father     Arthritis Father     Anxiety disorder Father     Depression Father     Urolithiasis Other        Social History     Tobacco Use    Smoking status: Never     Passive exposure: Yes    Smokeless tobacco: Never   Vaping Use    Vaping status: Every Day   Substance Use Topics    Alcohol use: Yes     Alcohol/week: 5.0 standard drinks of alcohol     Types: 5 Cans of beer per week     Comment: occ    Drug use: Not Currently     Frequency: 7.0 times per week     Types: Cocaine(coke), LSD, Marijuana, MDMA (ecstacy), Psilocybin       Review of Systems  All systems were reviewed and negative except for:   Review of Systems   Constitutional: Negative for chills, fever and unexpected weight loss.   HENT: Negative for congestion, nosebleeds and voice change.    Eyes: Negative for blurred vision, double vision and discharge.   Respiratory: Negative for apnea, chest tightness and shortness of breath.    Cardiovascular: Negative for chest pain and leg swelling.   Gastrointestinal:        See HPI   Endocrine: Negative for cold intolerance and heat intolerance.   Genitourinary: Negative for dysuria, hematuria and urgency.   Musculoskeletal: Negative for back pain, joint swelling and neck pain.   Skin: Negative for color change and dry skin.   Neurological: Negative for dizziness and  confusion.   Hematological: Negative for adenopathy.   Psychiatric/Behavioral: Negative for agitation and behavioral problems.     MEDS:  Prior to Admission medications    Medication Sig Start Date End Date Taking? Authorizing Provider   busPIRone (BUSPAR) 7.5 MG tablet Take 1 tablet by mouth 3 (Three) Times a Day As Needed (anxiety). 12/6/24  Yes Padmini Hernández APRN   estradiol (MINIVELLE, VIVELLE-DOT) 0.05 MG/24HR patch APPLY 1 PATCH TO SKIN TWICE PER WEEK 12/4/24  Yes ProviderJohnnie MD   ferrous gluconate (FERGON) 324 MG tablet Take 1 tablet by mouth Daily With Breakfast. 12/17/24  Yes Alexis Feliciano PA-C   ferrous sulfate 325 (65 FE) MG tablet Take 1 tablet by mouth Daily With Breakfast. 12/6/24  Yes Padmini Hernández APRN   sodium-potassium-magnesium sulfates (SUPREP) 17.5-3.13-1.6 GM/177ML solution oral solution Take as directed 12/19/24   Bam Stevens MD        Allergies:  Oxycodone-acetaminophen    Objective     Vital Signs        Physical Exam:     General Appearance:    Alert, cooperative, in no acute distress   Head:    Normocephalic, without obvious abnormality, atraumatic   Eyes:          Conjunctivae and sclerae normal, no icterus,     Ears:    Ears appear intact with no abnormalities noted   Throat:   No oral lesions, no thrush, oral mucosa moist   Neck:   No adenopathy, supple, trachea midline, no thyromegaly   Back:     No kyphosis present, no scoliosis present, no skin lesions,      erythema or scars, no tenderness to percussion or                   palpation,   range of motion normal   Lungs:     Clear to auscultation,respirations regular, even and                  unlabored    Heart:    Regular rhythm and normal rate, normal S1 and S2, no            murmur, no gallop, no rub, no click   Chest Wall:    No abnormalities observed   Abdomen:     Normal bowel sounds, no masses, no organomegaly, soft        non-tender, non-distended, no guarding, no rebound                 "tenderness   Rectal:   Noninflamed hemorrhoid skin tag   Extremities:   Moves all extremities well, no edema, no cyanosis, no             redness   Pulses:   Pulses palpable and equal bilaterally   Skin:   No bleeding, bruising or rash   Lymph nodes:   No palpable adenopathy   Neurologic:   A/o x 4 with no deficits       Results Review:   {Results Review:[id number]::\"I reviewed the patient's new clinical results.\"    LABS/IMAGING:  Results for orders placed or performed in visit on 12/17/24   CBC Auto Differential    Collection Time: 12/17/24  2:36 PM    Specimen: Arm, Right; Blood   Result Value Ref Range    WBC 10.97 (H) 3.40 - 10.80 10*3/mm3    RBC 4.55 3.77 - 5.28 10*6/mm3    Hemoglobin 14.0 12.0 - 15.9 g/dL    Hematocrit 42.6 34.0 - 46.6 %    MCV 93.6 79.0 - 97.0 fL    MCH 30.8 26.6 - 33.0 pg    MCHC 32.9 31.5 - 35.7 g/dL    RDW 11.9 (L) 12.3 - 15.4 %    RDW-SD 41.1 37.0 - 54.0 fl    MPV 9.3 6.0 - 12.0 fL    Platelets 320 140 - 450 10*3/mm3    Neutrophil % 70.1 42.7 - 76.0 %    Lymphocyte % 17.1 (L) 19.6 - 45.3 %    Monocyte % 8.0 5.0 - 12.0 %    Eosinophil % 3.7 0.3 - 6.2 %    Basophil % 0.8 0.0 - 1.5 %    Immature Grans % 0.3 0.0 - 0.5 %    Neutrophils, Absolute 7.68 (H) 1.70 - 7.00 10*3/mm3    Lymphocytes, Absolute 1.88 0.70 - 3.10 10*3/mm3    Monocytes, Absolute 0.88 0.10 - 0.90 10*3/mm3    Eosinophils, Absolute 0.41 (H) 0.00 - 0.40 10*3/mm3    Basophils, Absolute 0.09 0.00 - 0.20 10*3/mm3    Immature Grans, Absolute 0.03 0.00 - 0.05 10*3/mm3    nRBC 0.0 0.0 - 0.2 /100 WBC   Lactate Dehydrogenase    Collection Time: 12/17/24  2:36 PM    Specimen: Arm, Right; Blood   Result Value Ref Range     135 - 214 U/L   Comprehensive Metabolic Panel    Collection Time: 12/17/24  2:36 PM    Specimen: Arm, Right; Blood   Result Value Ref Range    Glucose 82 65 - 99 mg/dL    BUN 10 6 - 20 mg/dL    Creatinine 0.60 0.57 - 1.00 mg/dL    Sodium 139 136 - 145 mmol/L    Potassium 4.1 3.5 - 5.2 mmol/L    Chloride 104 98 - 107 " mmol/L    CO2 26.6 22.0 - 29.0 mmol/L    Calcium 9.3 8.6 - 10.5 mg/dL    Total Protein 7.3 6.0 - 8.5 g/dL    Albumin 4.3 3.5 - 5.2 g/dL    ALT (SGPT) 15 1 - 33 U/L    AST (SGOT) 15 1 - 32 U/L    Alkaline Phosphatase 76 39 - 117 U/L    Total Bilirubin 0.3 0.0 - 1.2 mg/dL    Globulin 3.0 gm/dL    A/G Ratio 1.4 g/dL    BUN/Creatinine Ratio 16.7 7.0 - 25.0    Anion Gap 8.4 5.0 - 15.0 mmol/L    eGFR 127.9 >60.0 mL/min/1.73   HIV-1 / O / 2 Ag / Antibody    Collection Time: 12/17/24  2:36 PM    Specimen: Arm, Right; Blood   Result Value Ref Range    HIV-1/ HIV-2 Ab Non-Reactive Non-Reactive    HIV-1 P24 Ag Screen Non-Reactive Non-Reactive   Uric Acid    Collection Time: 12/17/24  2:36 PM    Specimen: Arm, Right; Blood   Result Value Ref Range    Uric Acid 4.3 2.4 - 5.7 mg/dL   Hepatitis Panel, Acute    Collection Time: 12/17/24  2:36 PM    Specimen: Arm, Right; Blood   Result Value Ref Range    Hepatitis B Surface Ag Non-Reactive Non-Reactive    Hep A IgM Non-Reactive Non-Reactive    Hep B C IgM Non-Reactive Non-Reactive    Hepatitis C Ab Non-Reactive Non-Reactive   Sedimentation Rate    Collection Time: 12/17/24  2:36 PM    Specimen: Arm, Right; Blood   Result Value Ref Range    Sed Rate 3 0 - 20 mm/hr   Vitamin B12 & Folate    Collection Time: 12/17/24  2:36 PM    Specimen: Arm, Right; Blood   Result Value Ref Range    Folate 12.50 4.78 - 24.20 ng/mL    Vitamin B-12 513 211 - 946 pg/mL   Hemochromatosis Mutation    Collection Time: 12/17/24  2:36 PM    Specimen: Arm, Right; Blood   Result Value Ref Range    Hemochromatosis Gene Comment     REVIEWED BY Comment    Iron Profile    Collection Time: 12/17/24  2:36 PM    Specimen: Arm, Right; Blood   Result Value Ref Range    Iron 95 37 - 145 mcg/dL    Iron Saturation (TSAT) 28 20 - 50 %    Transferrin 231 200 - 360 mg/dL    TIBC 344 298 - 536 mcg/dL   Ferritin    Collection Time: 12/17/24  2:36 PM    Specimen: Arm, Right; Blood   Result Value Ref Range    Ferritin 45.84 13.00  - 150.00 ng/mL   Manual Differential    Collection Time: 12/17/24  2:36 PM    Specimen: Arm, Right; Blood   Result Value Ref Range    Neutrophil % 75.0 42.7 - 76.0 %    Lymphocyte % 18.0 (L) 19.6 - 45.3 %    Monocyte % 4.0 (L) 5.0 - 12.0 %    Eosinophil % 1.0 0.3 - 6.2 %    Basophil % 2.0 (H) 0.0 - 1.5 %    Neutrophils Absolute 8.23 (H) 1.70 - 7.00 10*3/mm3    Lymphocytes Absolute 1.97 0.70 - 3.10 10*3/mm3    Monocytes Absolute 0.44 0.10 - 0.90 10*3/mm3    Eosinophils Absolute 0.11 0.00 - 0.40 10*3/mm3    Basophils Absolute 0.22 (H) 0.00 - 0.20 10*3/mm3    RBC Morphology Normal Normal    WBC Morphology Normal Normal    Platelet Estimate Adequate Normal   Peripheral Blood Smear    Collection Time: 12/17/24  2:36 PM    Specimen: Arm, Right; Blood   Result Value Ref Range    Pathology Review Yes    Pathology Consultation    Collection Time: 12/17/24  2:36 PM    Specimen: Blood, Venous   Result Value Ref Range    Final Diagnosis       Peripheral blood (CBC and smear):   - WBC:  Leukocytosis, neutrophilia, and eosinophilia with neutrophil toxic change   - RBC:  Normocytic normochromic RBCs   - Platelets:  Normal platelet count and morphology    Comment:  The following test was performed at a reference laboratory on the peripheral blood. See separate reference laboratory report for additional information.      Flow cytometry: No significant immunophenotypic abnormality detected.       Clinical Information       Leukocytosis, unspecified type      Gross Description       1. Blood, Venous.  One (1) Arechiga-stained peripheral blood smear is received for evaluation. The smear is accompanied by a CBC analysis report and marked as a physician/advanced practice clinician review.            Microscopic Description       Microscopic examination performed.      Case Report       Surgical Pathology Report                         Case: [id number]                                  Authorizing Provider:  Alexis Feliciano PA-C         Collected:           12/17/2024 02:36 PM          Ordering Location:     [facility]     Received:            12/18/2024 09:06 AM                                 GROUP HEMATOLOGY &                                                                                  ONCOLOGY                                                                     Pathologist:           Francesco Gan MD                                                            Specimen:    Blood, Venous, PBS                                                                        Flow Cytometry    Collection Time: 12/17/24  2:36 PM    Specimen: Arm, Right; Blood   Result Value Ref Range    Consult Global Result Comment^Text^TXT         Result Review : Labs  Result Review  Imaging  Med Tab  Media     Assessment & Plan     Symptomatic hemorrhoid skin tag  Hematochezia  Constipation    Discussion with patient.  For her constipation I recommended referral to gastroenterology for further evaluation and treatment.  She is agreeable.  Orders placed.  I instructed her to start over-the-counter fiber.  I instructed her to be on and off the toilet quickly.  For her hematochezia I recommended colonoscopy with rubber band ligation of her internal hemorrhoids.  Benefits and alternatives discussed.  Risk procedure including risk of bleeding, perforation, missing a polyp, pain, infection discussed.  She agrees with the plan.  Orders placed.  I then recommended proceeding with excision of the hemorrhoid skin tag in the operating room.  Procedure and recovery discussed.  Benefits and alternatives discussed.  Risk procedure including risk of anesthesia, bleeding, infection, recurrence, pain discussed.  All questions answered.  She agrees with the plan.  Orders placed.  No bowel prep.           This document has been electronically signed by Bam Stevens MD  December 27, 2024 10:51 EST

## 2025-01-13 ENCOUNTER — ANESTHESIA EVENT (OUTPATIENT)
Dept: GASTROENTEROLOGY | Facility: HOSPITAL | Age: 25
End: 2025-01-13
Payer: COMMERCIAL

## 2025-01-13 NOTE — ANESTHESIA PREPROCEDURE EVALUATION
" Anesthesia Evaluation     History of anesthetic complications: Post lumbar puncture h/a.  NPO Solid Status: > 8 hours  NPO Liquid Status: > 4 hours           Airway   Mallampati: I  TM distance: >3 FB  Neck ROM: full  No difficulty expected  Comment: Small chin  Dental      Pulmonary     breath sounds clear to auscultation  (+) a smoker Current, Abstained day of surgery, cigarettes,  Cardiovascular   Exercise tolerance: excellent (>7 METS)    Rhythm: regular  Rate: normal        Neuro/Psych  (+) headaches, psychiatric history Anxiety and Depression    ROS Comment: As a teen IIH (idiopathic intracranial hypertension), required lumbar punctures to relieve pressures/headaches, was on diuretic for approx. 1 year. No issues for \"a long time\".  GI/Hepatic/Renal/Endo    (+) obesity, GI bleeding lower active bleeding    Musculoskeletal     Abdominal    Substance History   (+) drug use (Daily marijuana)     OB/GYN          Other        ROS/Med Hx Other:                             Anesthesia Plan    ASA 2     general   total IV anesthesia  (Total IV Anesthesia    Patient understands anesthesia not responsible for dental damage.      Discussed risks with pt including aspiration, allergic reactions, apnea, advanced airway placement. Pt verbalized understanding. All questions answered.   )  intravenous induction     Anesthetic plan, risks, benefits, and alternatives have been provided, discussed and informed consent has been obtained with: patient.    Plan discussed with CRNA.        CODE STATUS:         "

## 2025-01-14 ENCOUNTER — ANESTHESIA (OUTPATIENT)
Dept: GASTROENTEROLOGY | Facility: HOSPITAL | Age: 25
End: 2025-01-14
Payer: COMMERCIAL

## 2025-01-14 ENCOUNTER — HOSPITAL ENCOUNTER (OUTPATIENT)
Facility: HOSPITAL | Age: 25
Setting detail: HOSPITAL OUTPATIENT SURGERY
Discharge: HOME | End: 2025-01-14
Attending: SURGERY | Admitting: SURGERY
Payer: COMMERCIAL

## 2025-01-14 VITALS
TEMPERATURE: 96.9 F | SYSTOLIC BLOOD PRESSURE: 112 MMHG | WEIGHT: 170.64 LBS | OXYGEN SATURATION: 100 % | HEIGHT: 62 IN | HEART RATE: 74 BPM | DIASTOLIC BLOOD PRESSURE: 73 MMHG | RESPIRATION RATE: 15 BRPM | BODY MASS INDEX: 31.4 KG/M2

## 2025-01-14 DIAGNOSIS — K62.5 RECTAL BLEEDING: ICD-10-CM

## 2025-01-14 LAB — B-HCG UR QL: NEGATIVE

## 2025-01-14 PROCEDURE — 25810000003 LACTATED RINGERS PER 1000 ML: Performed by: NURSE ANESTHETIST, CERTIFIED REGISTERED

## 2025-01-14 PROCEDURE — 25010000002 LIDOCAINE PF 2% 2 % SOLUTION: Performed by: NURSE ANESTHETIST, CERTIFIED REGISTERED

## 2025-01-14 PROCEDURE — 25010000002 PROPOFOL 10 MG/ML EMULSION: Performed by: NURSE ANESTHETIST, CERTIFIED REGISTERED

## 2025-01-14 PROCEDURE — 81025 URINE PREGNANCY TEST: CPT | Performed by: SURGERY

## 2025-01-14 RX ORDER — LIDOCAINE HYDROCHLORIDE 20 MG/ML
INJECTION, SOLUTION EPIDURAL; INFILTRATION; INTRACAUDAL; PERINEURAL AS NEEDED
Status: DISCONTINUED | OUTPATIENT
Start: 2025-01-14 | End: 2025-01-14 | Stop reason: SURG

## 2025-01-14 RX ORDER — PROPOFOL 10 MG/ML
VIAL (ML) INTRAVENOUS AS NEEDED
Status: DISCONTINUED | OUTPATIENT
Start: 2025-01-14 | End: 2025-01-14 | Stop reason: SURG

## 2025-01-14 RX ORDER — SODIUM CHLORIDE, SODIUM LACTATE, POTASSIUM CHLORIDE, CALCIUM CHLORIDE 600; 310; 30; 20 MG/100ML; MG/100ML; MG/100ML; MG/100ML
INJECTION, SOLUTION INTRAVENOUS CONTINUOUS PRN
Status: DISCONTINUED | OUTPATIENT
Start: 2025-01-14 | End: 2025-01-14 | Stop reason: SURG

## 2025-01-14 RX ADMIN — PROPOFOL 100 MG: 10 INJECTION, EMULSION INTRAVENOUS at 12:18

## 2025-01-14 RX ADMIN — LIDOCAINE HYDROCHLORIDE 100 MG: 20 INJECTION, SOLUTION INTRAVENOUS at 12:18

## 2025-01-14 RX ADMIN — SODIUM CHLORIDE, POTASSIUM CHLORIDE, SODIUM LACTATE AND CALCIUM CHLORIDE: 600; 310; 30; 20 INJECTION, SOLUTION INTRAVENOUS at 12:15

## 2025-01-14 RX ADMIN — PROPOFOL 250 MCG/KG/MIN: 10 INJECTION, EMULSION INTRAVENOUS at 12:19

## 2025-01-14 NOTE — ANESTHESIA POSTPROCEDURE EVALUATION
Patient: Janey Wakefield    Procedure Summary       Date: 01/14/25 Room / Location: [facility] ENDOSCOPY 1 / [facility] ENDOSCOPY    Anesthesia Start: 1215 Anesthesia Stop: 1233    Procedure: COLONOSCOPY, HEMORRHOID BANDING Diagnosis:       Rectal bleeding      (Rectal bleeding [K62.5])    Surgeons: Bam Stevens MD Provider: Edita Chakraborty CRNA    Anesthesia Type: general ASA Status: 2            Anesthesia Type: general    Vitals  Vitals Value Taken Time   /76 01/14/25 1257   Temp 36.1 °C (96.9 °F) 01/14/25 1251   Pulse 69 01/14/25 1258   Resp 15 01/14/25 1251   SpO2 100 % 01/14/25 1258   Vitals shown include unfiled device data.        Post Anesthesia Care and Evaluation    Patient location during evaluation: bedside  Patient participation: complete - patient participated  Level of consciousness: awake  Pain management: adequate    Airway patency: patent  Anesthetic complications: No anesthetic complications  PONV Status: controlled  Cardiovascular status: acceptable and stable  Respiratory status: acceptable

## 2025-01-23 ENCOUNTER — TELEPHONE (OUTPATIENT)
Dept: SURGERY | Facility: CLINIC | Age: 25
End: 2025-01-23
Payer: COMMERCIAL

## 2025-01-23 NOTE — NURSING NOTE
NOTIFIED DR MAHAN OFFICE THAT AFTER COMPLETED PT PHONE SCREEN FOR HER UPCOMING SURGERY ON 1-27-25, SHE REALIZED SHE HAD TO WORK THAT DAY AND NEEDS TO RESCHEDULE HER SURGERY. NOTIFIED KAVYA AT DR MAHAN OFFICE. OFFICE TO CALL PATIENT TO RESCHEDULE HER SURGERY.

## 2025-01-23 NOTE — TELEPHONE ENCOUNTER
LUIS ANGEL SIDHU CALLED FROM [facility].  SHE DID PHONE SCREEN FOR SURGERY 01/27/25.  PATIENT TOLD HER SHE WANTS TO RESCHEDULE, BECAUSE SHE HAS TO WORK.

## 2025-01-23 NOTE — PRE-PROCEDURE INSTRUCTIONS
PATIENT INSTRUCTED TO BE:    - NOTHING TO EAT AFTER MIDNIGHT OR CHEW, EXCEPT CAN HAVE SIPS OF WATER WITH MEDICATIONS     - TO HOLD ALL VITAMINS, SUPPLEMENTS, NSAIDS FOR ONE WEEK PRIOR TO THEIR SURGICAL PROCEDURE    - DO NOT TAKE ___N/A___________________ 7 DAYS PRIOR TO PROCEDURE PER ANESTHESIA RECOMMENDATIONS/INSTRUCTIONS     - INSTRUCTED PT TO USE SURGICAL SOAP 1 TIME THE NIGHT PRIOR TO SURGERY _2-23-25___ OR THE AM OF SURGERY __8-50-86_______   USE THE SOAP FROM NECK TO TOES, AVOID THEIR FACE, HAIR, AND PRIVATE PARTS. IF USE THE SOAP THE NIGHT PRIOR TO SURGERY, CHANGE BED LINENS AND NO PETS IN THE BED.     INSTRUCTED NO LOTIONS, JEWELRY, PIERCINGS,  NAIL POLISH, OR DEODORANT DAY OF SURGERY    - IF DIABETIC, CHECK BLOOD GLUCOSE IF LESS THAN 70 OR HAVING SYMPTOMS CALL THE PREOP AREA FOR INSTRUCTIONS ON AM OF SURGERY ([phone])    -INSTRUCTED TO TAKE THE FOLLOWING MEDICATIONS THE DAY OF SURGERY WITH SIPS OF WATER:         BUSPAR PRN, IRON      - DO NOT BRING ANY MEDICATIONS WITH YOU TO THE HOSPITAL THE DAY OF SURGERY, EXCEPT IF USE INHALERS. BRING INHALERS DAY OF SURGERY       - BRING CPAP OR BIPAP TO THE HOSPITAL ONLY IF YOU ARE SPENDING THE NIGHT    - DO NOT SMOKE OR VAPE 24 HOURS PRIOR TO PROCEDURE PER ANESTHESIA REQUEST     -MAKE SURE YOU HAVE A RIDE HOME OR SOMEONE TO STAY WITH YOU THE DAY OF THE PROCEDURE AFTER YOU GO HOME     - FOLLOW ANY OTHER INSTRUCTIONS GIVEN TO YOU BY YOUR SURGEON'S OFFICE.     - DAY OF SURGERY __1-35-47________,   [facility] ( [facility]), COME TO [facility]/ [facility], FIRST FLOOR. CHECK IN AT THE DESK FOR REGISTRATION/SURGERY    - YOU WILL RECEIVE A PHONE CALL THE DAY PRIOR TO SURGERY BETWEEN 1PM AND 4 PM WITH ARRIVAL TIME, IF YOUR SURGERY IS ON A MONDAY YOU WILL RECEIVE A CALL THE FRIDAY PRIOR TO SURGERY DATE    - BRING CASH OR CREDIT CARD FOR COPAYMENT OF MEDICATIONS AFTER SURGERY IF YOU USE THE HOSPITAL PHARMACY (MEDS TO BED)    - PREADMISSION TESTING NURSE  LUIS ANGEL BARFIELD -[phone] IF HAVE ANY QUESTIONS     -PATIENT PROVIDED THE NUMBER FOR PREOP SURGICAL DEPT IF HAD QUESTIONS AFTER HOURS PRIOR TO SURGERY ( [phone]).  INFORMED PT IF NO ANSWER, LEAVE A MESSAGE AND SOMEONE WILL RETURN THEIR CALL       PATIENT VERBALIZED UNDERSTANDING

## 2025-01-24 NOTE — TELEPHONE ENCOUNTER
PATIENT IS RESCHEDULED FOR 02-21-25. SCHEDULED WITH LUZ IN SURGERY SCHEDULING. PATIENT AWARE OF DATE AND VOICED UNDERSTANDING.

## 2025-02-14 ENCOUNTER — TELEPHONE (OUTPATIENT)
Dept: SURGERY | Facility: CLINIC | Age: 25
End: 2025-02-14
Payer: COMMERCIAL

## 2025-02-14 NOTE — TELEPHONE ENCOUNTER
PATIENT RESCHEDULED FOR 02-24-25, SCHEDULED WITH MEI IN SURGERY SCHEDULING. PATIENT AWARE OF DATE AND VOICED UNDERSTANDING.

## 2025-02-18 RX ORDER — ESTRADIOL 0.05 MG/D
PATCH, EXTENDED RELEASE TRANSDERMAL
OUTPATIENT
Start: 2025-02-18

## 2025-02-18 NOTE — TELEPHONE ENCOUNTER
Spoke with patient, she states she is going to have her OB take care of this, she said she has an appt with OB today.

## 2025-02-19 ENCOUNTER — TRANSCRIBE ORDERS (OUTPATIENT)
Dept: ADMINISTRATIVE | Facility: HOSPITAL | Age: 25
End: 2025-02-19
Payer: COMMERCIAL

## 2025-02-19 DIAGNOSIS — N97.1 FEMALE INFERTILITY OF TUBAL ORIGIN: Primary | ICD-10-CM

## 2025-02-23 ENCOUNTER — ANESTHESIA EVENT (OUTPATIENT)
Dept: PERIOP | Facility: HOSPITAL | Age: 25
End: 2025-02-23
Payer: COMMERCIAL

## 2025-02-24 ENCOUNTER — HOSPITAL ENCOUNTER (OUTPATIENT)
Facility: HOSPITAL | Age: 25
Setting detail: HOSPITAL OUTPATIENT SURGERY
Discharge: HOME | End: 2025-02-24
Attending: SURGERY | Admitting: SURGERY
Payer: COMMERCIAL

## 2025-02-24 ENCOUNTER — ANESTHESIA (OUTPATIENT)
Dept: PERIOP | Facility: HOSPITAL | Age: 25
End: 2025-02-24
Payer: COMMERCIAL

## 2025-02-24 VITALS
HEART RATE: 64 BPM | SYSTOLIC BLOOD PRESSURE: 107 MMHG | HEIGHT: 62 IN | BODY MASS INDEX: 32.42 KG/M2 | RESPIRATION RATE: 16 BRPM | OXYGEN SATURATION: 98 % | DIASTOLIC BLOOD PRESSURE: 71 MMHG | WEIGHT: 176.15 LBS | TEMPERATURE: 97.8 F

## 2025-02-24 DIAGNOSIS — K64.4 HEMORRHOIDAL SKIN TAG: ICD-10-CM

## 2025-02-24 LAB — B-HCG UR QL: NEGATIVE

## 2025-02-24 PROCEDURE — 81025 URINE PREGNANCY TEST: CPT | Performed by: ANESTHESIOLOGY

## 2025-02-24 PROCEDURE — 25010000002 BUPIVACAINE (PF) 0.25 % SOLUTION: Performed by: SURGERY

## 2025-02-24 PROCEDURE — 25010000002 ONDANSETRON PER 1 MG: Performed by: NURSE ANESTHETIST, CERTIFIED REGISTERED

## 2025-02-24 PROCEDURE — 25010000002 DEXAMETHASONE SODIUM PHOSPHATE 100 MG/10ML SOLUTION: Performed by: SURGERY

## 2025-02-24 PROCEDURE — 25010000002 DEXAMETHASONE PER 1 MG: Performed by: NURSE ANESTHETIST, CERTIFIED REGISTERED

## 2025-02-24 PROCEDURE — 25810000003 LACTATED RINGERS PER 1000 ML: Performed by: ANESTHESIOLOGY

## 2025-02-24 PROCEDURE — 88304 TISSUE EXAM BY PATHOLOGIST: CPT | Performed by: SURGERY

## 2025-02-24 PROCEDURE — 25010000002 MIDAZOLAM PER 1MG: Performed by: ANESTHESIOLOGY

## 2025-02-24 PROCEDURE — 25010000002 CEFAZOLIN PER 500 MG: Performed by: SURGERY

## 2025-02-24 PROCEDURE — 46220 EXCISE ANAL EXT TAG/PAPILLA: CPT | Performed by: SURGERY

## 2025-02-24 PROCEDURE — 25010000002 PROPOFOL 10 MG/ML EMULSION: Performed by: NURSE ANESTHETIST, CERTIFIED REGISTERED

## 2025-02-24 PROCEDURE — 25010000002 LIDOCAINE PF 2% 2 % SOLUTION: Performed by: NURSE ANESTHETIST, CERTIFIED REGISTERED

## 2025-02-24 PROCEDURE — 25010000002 BUPRENORPHINE PER 0.1 MG: Performed by: SURGERY

## 2025-02-24 PROCEDURE — 25010000002 FENTANYL CITRATE (PF) 50 MCG/ML SOLUTION: Performed by: NURSE ANESTHETIST, CERTIFIED REGISTERED

## 2025-02-24 RX ORDER — HYDROCODONE BITARTRATE AND ACETAMINOPHEN 5; 325 MG/1; MG/1
1 TABLET ORAL EVERY 6 HOURS PRN
Qty: 8 TABLET | Refills: 0 | Status: SHIPPED | OUTPATIENT
Start: 2025-02-24

## 2025-02-24 RX ORDER — ACETAMINOPHEN 500 MG
1000 TABLET ORAL ONCE
Status: COMPLETED | OUTPATIENT
Start: 2025-02-24 | End: 2025-02-24

## 2025-02-24 RX ORDER — ONDANSETRON 2 MG/ML
INJECTION INTRAMUSCULAR; INTRAVENOUS AS NEEDED
Status: DISCONTINUED | OUTPATIENT
Start: 2025-02-24 | End: 2025-02-24 | Stop reason: SURG

## 2025-02-24 RX ORDER — MIDAZOLAM HYDROCHLORIDE 2 MG/2ML
2 INJECTION, SOLUTION INTRAMUSCULAR; INTRAVENOUS ONCE
Status: COMPLETED | OUTPATIENT
Start: 2025-02-24 | End: 2025-02-24

## 2025-02-24 RX ORDER — PROPOFOL 10 MG/ML
VIAL (ML) INTRAVENOUS AS NEEDED
Status: DISCONTINUED | OUTPATIENT
Start: 2025-02-24 | End: 2025-02-24 | Stop reason: SURG

## 2025-02-24 RX ORDER — SODIUM CHLORIDE, SODIUM LACTATE, POTASSIUM CHLORIDE, CALCIUM CHLORIDE 600; 310; 30; 20 MG/100ML; MG/100ML; MG/100ML; MG/100ML
9 INJECTION, SOLUTION INTRAVENOUS CONTINUOUS PRN
Status: DISCONTINUED | OUTPATIENT
Start: 2025-02-24 | End: 2025-02-24 | Stop reason: HOSPADM

## 2025-02-24 RX ORDER — PROMETHAZINE HYDROCHLORIDE 12.5 MG/1
25 TABLET ORAL ONCE AS NEEDED
Status: DISCONTINUED | OUTPATIENT
Start: 2025-02-24 | End: 2025-02-24 | Stop reason: HOSPADM

## 2025-02-24 RX ORDER — DEXAMETHASONE SODIUM PHOSPHATE 4 MG/ML
INJECTION, SOLUTION INTRA-ARTICULAR; INTRALESIONAL; INTRAMUSCULAR; INTRAVENOUS; SOFT TISSUE AS NEEDED
Status: DISCONTINUED | OUTPATIENT
Start: 2025-02-24 | End: 2025-02-24 | Stop reason: SURG

## 2025-02-24 RX ORDER — SODIUM CHLORIDE 9 MG/ML
40 INJECTION, SOLUTION INTRAVENOUS AS NEEDED
Status: DISCONTINUED | OUTPATIENT
Start: 2025-02-24 | End: 2025-02-24 | Stop reason: HOSPADM

## 2025-02-24 RX ORDER — FENTANYL CITRATE 50 UG/ML
INJECTION, SOLUTION INTRAMUSCULAR; INTRAVENOUS AS NEEDED
Status: DISCONTINUED | OUTPATIENT
Start: 2025-02-24 | End: 2025-02-24 | Stop reason: SURG

## 2025-02-24 RX ORDER — MEPERIDINE HYDROCHLORIDE 25 MG/ML
12.5 INJECTION INTRAMUSCULAR; INTRAVENOUS; SUBCUTANEOUS
Status: DISCONTINUED | OUTPATIENT
Start: 2025-02-24 | End: 2025-02-24 | Stop reason: HOSPADM

## 2025-02-24 RX ORDER — ONDANSETRON 2 MG/ML
4 INJECTION INTRAMUSCULAR; INTRAVENOUS ONCE AS NEEDED
Status: DISCONTINUED | OUTPATIENT
Start: 2025-02-24 | End: 2025-02-24 | Stop reason: HOSPADM

## 2025-02-24 RX ORDER — SODIUM CHLORIDE 0.9 % (FLUSH) 0.9 %
10 SYRINGE (ML) INJECTION AS NEEDED
Status: DISCONTINUED | OUTPATIENT
Start: 2025-02-24 | End: 2025-02-24 | Stop reason: HOSPADM

## 2025-02-24 RX ORDER — SODIUM CHLORIDE, SODIUM LACTATE, POTASSIUM CHLORIDE, CALCIUM CHLORIDE 600; 310; 30; 20 MG/100ML; MG/100ML; MG/100ML; MG/100ML
50 INJECTION, SOLUTION INTRAVENOUS CONTINUOUS
Status: DISCONTINUED | OUTPATIENT
Start: 2025-02-24 | End: 2025-02-24 | Stop reason: HOSPADM

## 2025-02-24 RX ORDER — LIDOCAINE HYDROCHLORIDE 20 MG/ML
INJECTION, SOLUTION EPIDURAL; INFILTRATION; INTRACAUDAL; PERINEURAL AS NEEDED
Status: DISCONTINUED | OUTPATIENT
Start: 2025-02-24 | End: 2025-02-24 | Stop reason: SURG

## 2025-02-24 RX ORDER — SODIUM CHLORIDE 0.9 % (FLUSH) 0.9 %
10 SYRINGE (ML) INJECTION EVERY 12 HOURS SCHEDULED
Status: DISCONTINUED | OUTPATIENT
Start: 2025-02-24 | End: 2025-02-24 | Stop reason: HOSPADM

## 2025-02-24 RX ORDER — PROMETHAZINE HYDROCHLORIDE 25 MG/1
25 SUPPOSITORY RECTAL ONCE AS NEEDED
Status: DISCONTINUED | OUTPATIENT
Start: 2025-02-24 | End: 2025-02-24 | Stop reason: HOSPADM

## 2025-02-24 RX ORDER — POLYETHYLENE GLYCOL 3350 17 G/17G
17 POWDER, FOR SOLUTION ORAL DAILY
Qty: 5 PACKET | Refills: 0 | Status: SHIPPED | OUTPATIENT
Start: 2025-02-24

## 2025-02-24 RX ADMIN — FENTANYL CITRATE 50 MCG: 50 INJECTION, SOLUTION INTRAMUSCULAR; INTRAVENOUS at 13:40

## 2025-02-24 RX ADMIN — SODIUM CHLORIDE 2000 MG: 9 INJECTION, SOLUTION INTRAVENOUS at 13:44

## 2025-02-24 RX ADMIN — PROPOFOL 200 MG: 10 INJECTION, EMULSION INTRAVENOUS at 13:40

## 2025-02-24 RX ADMIN — LIDOCAINE HYDROCHLORIDE 60 MG: 20 INJECTION, SOLUTION INTRAVENOUS at 13:40

## 2025-02-24 RX ADMIN — SODIUM CHLORIDE, SODIUM LACTATE, POTASSIUM CHLORIDE, CALCIUM CHLORIDE: 20; 30; 600; 310 INJECTION, SOLUTION INTRAVENOUS at 13:40

## 2025-02-24 RX ADMIN — ACETAMINOPHEN 1000 MG: 500 TABLET ORAL at 11:45

## 2025-02-24 RX ADMIN — ONDANSETRON 4 MG: 2 INJECTION INTRAMUSCULAR; INTRAVENOUS at 13:47

## 2025-02-24 RX ADMIN — SODIUM CHLORIDE, SODIUM LACTATE, POTASSIUM CHLORIDE, CALCIUM CHLORIDE 9 ML/HR: 20; 30; 600; 310 INJECTION, SOLUTION INTRAVENOUS at 11:45

## 2025-02-24 RX ADMIN — MIDAZOLAM HYDROCHLORIDE 2 MG: 1 INJECTION, SOLUTION INTRAMUSCULAR; INTRAVENOUS at 13:26

## 2025-02-24 RX ADMIN — FENTANYL CITRATE 50 MCG: 50 INJECTION, SOLUTION INTRAMUSCULAR; INTRAVENOUS at 13:51

## 2025-02-24 RX ADMIN — DEXAMETHASONE SODIUM PHOSPHATE 4 MG: 4 INJECTION, SOLUTION INTRAMUSCULAR; INTRAVENOUS at 13:47

## 2025-02-24 NOTE — OP NOTE
OP NOTE  HEMORRHOIDECTOMY  Procedure Report    Patient Name:  Janey Wakefield  YOB: 2000  [id number]    Date of Surgery:  2/24/2025     Indications: See last clinic note for indications, discussion of risk benefits and alternatives    Pre-op Diagnosis:   Hemorrhoidal skin tag [K64.4]       Post-Op Diagnosis Codes:     * Hemorrhoidal skin tag [K64.4]    Procedure/CPT® Codes:  Excision of anterior hemorrhoidal skin tag    Staff:  Surgeon(s):  Bam Stevens MD    Assistant: Bonnie Quezada CSA    Anesthesia: General, Local    Estimated Blood Loss: 1 mL    Implants:    Nothing was implanted during the procedure    Specimen:          Specimens       ID Source Type Tests Collected By Collected At Frozen?    A Hemorrhoid(s) Tissue TISSUE PATHOLOGY EXAM   Bam Stevens MD 2/24/25 8444     Description: hemorrhoid skin tag              Findings: Anterior hemorrhoidal skin tag excised.    Complications: None    Description of Procedure:   After all questions were answered, consent was verified.  Patient brought to the operating room per stretcher placed in supine position arms out all extremities padded.  Bilateral lower extremity SCDs placed.  Anesthesia induced.  Preoperative IV antibiotics administered.  Patient placed in candycane lithotomy.  Patient's perianal area prepped with Betadine.  Draped in usual sterile fashion.  Critical timeout taken.  Began the procedure excising the anterior hemorrhoidal skin tag with Bovie electrocautery.  I then obtained hemostasis.  I left the excision site open.  I verified hemostasis.  I infiltrated with local anesthesia.  Specimen sent to pathology for permanent.  At the end of the procedure all counts were correct.  I was present for the procedure.    Assistant: Bonnie Quezada CSA was responsible for performing the following activities: Retraction, Suction, and Placing Dressing and their skilled assistance was necessary for the success of  this case.    Bam Stevens MD     Date: 2/24/2025  Time: 13:56 EST

## 2025-02-24 NOTE — DISCHARGE INSTRUCTIONS
DISCHARGE INSTRUCTIONS  SURGICAL / AMBULATORY  PROCEDURES      For your surgery you had:  General anesthesia (you may have a sore throat for the first 24 hours)  IV sedation.  Local anesthesia  You may experience dizziness, drowsiness, or light-headedness for several hours following surgery/procedure.  Do not stay alone today or tonight.  Limit your activity for 24 hours.  Resume your diet slowly.  Follow whatever special dietary instructions you may have been given by your doctor.  You should not drive or operate machinery, drink alcohol, or sign legally binding documents for 24 hours or while you are taking pain medication.  Last dose of pain medication was given at:   .  NOTIFY YOUR DOCTOR IF YOU EXPERIENCE ANY OF THE FOLLOWING:  Temperature greater than 101 degrees Fahrenheit  Shaking Chills  Redness or excessive drainage from incision  Nausea, vomiting and/or pain that is not controlled by prescribed medications  Increase in bleeding or bleeding that is excessive  Unable to urinate in 6 hours after surgery  If unable to reach your doctor, please go to the closest Emergency Room    You may shower Tuesday.  Apply an ice pack as needed.  Use sitz bath as needed.   Medications per physician instructions as indicated on Discharge Medication Information Sheet.  You should see   for follow-up care   on   .  Phone number:       SPECIAL INSTRUCTIONS:     Call for fever, concern.  Okay to shower.  Sitz bath's as needed.  Okay to ice the area.  May have some drainage.          You had Tylenol at 11:45

## 2025-02-24 NOTE — H&P
No changes    History of present illness:    History of a symptomatic hemorrhoid skin tag which makes it hard to clean and also some urticaria.  She will have swelling of the area.  No pain with bowel movements.  Occasional painless hematochezia.  No previous colonoscopy.  No family history of colorectal cancer.  Father with Crohn's disease.  No blood thinner use.  No fiber use.  She has a long history of constipation.  She sits on the toilet for long periods of time.        History:  Medical History        Past Medical History:   Diagnosis Date    ADHD (attention deficit hyperactivity disorder) 6/2024    Adnexal cyst 03/31/2015     LEFT     Anxiety      Bladder problem 03/08/2023    Depression      History of medical problems      IIH (idiopathic intracranial hypertension) 10/10/2018     THE PATIENTS CLINICAL HISTORY AND EXAM ARE MOST CONSISTENT WITH IDIOPATHIC INTRACRANIAL HYPERTENSION. SHE STARTED HER FIRST DOSE OF DIAMOX TODAY. I WILL PURSUE A CT HEAD. I WILL PURSUE AN UGENT LUMBAR PUNCTURE. I WILL CHECK LABS. I WILL PURSUE AN MRV. I DID DISCUSS PURSUING A LUMBAR PUNCTURE TODAY BUT THE PATIENT DEFFERED. WE WILL ATTEMPT TO  ARRANGE THIS TOMORROW. I INSTRUCTED THE PATIENT TO SEE     Irritable bowel syndrome      Mass of uterine adnexa 03/31/2015    Post lumbar puncture headache 12/10/2016    Renal calculus 03/08/2023    Ureteral calculus 03/31/2015     RIGHT            Surgical History         Past Surgical History:   Procedure Laterality Date    DIAGNOSTIC LAPAROSCOPY, SALPINGO OOPHORECTOMY LAPAROSCOPIC Right 9/27/2024     Procedure: DIAGNOSTIC LAPAROSCOPY, RIGHT OVARIAN CYSTECTOMY, EVACUATION OF HEMOPERITONEUM;  Surgeon: Rachelle Marroquin MD;  Location: [facility];  Service: Gynecology;  Laterality: Right;    DIAGNOSTIC LAPAROSCOPY, SALPINGO OOPHORECTOMY LAPAROSCOPIC Right 10/19/2024     Procedure: RIGHT OOPHORECTOMY LAPAROSCOPIC AND EVACTION OF ENDOPERITONEUM;  Surgeon: Rachelle Marroquin MD;   Location: [facility] MAIN OR;  Service: Gynecology;  Laterality: Right;    HX OVARIAN CYSTECTOMY   2015    LUMBAR PUNCTURE        TONSILLECTOMY                      Family History   Problem Relation Age of Onset    Diabetes Mother      Miscarriages / Stillbirths Mother      Ulcerative colitis Father      Crohn's disease Father      Arthritis Father      Anxiety disorder Father      Depression Father      Urolithiasis Other           Social History   Social History            Tobacco Use    Smoking status: Never       Passive exposure: Yes    Smokeless tobacco: Never   Vaping Use    Vaping status: Every Day   Substance Use Topics    Alcohol use: Yes       Alcohol/week: 5.0 standard drinks of alcohol       Types: 5 Cans of beer per week       Comment: occ    Drug use: Not Currently       Frequency: 7.0 times per week       Types: Cocaine(coke), LSD, Marijuana, MDMA (ecstacy), Psilocybin            Review of Systems  All systems were reviewed and negative except for:   Review of Systems   Constitutional: Negative for chills, fever and unexpected weight loss.   HENT: Negative for congestion, nosebleeds and voice change.    Eyes: Negative for blurred vision, double vision and discharge.   Respiratory: Negative for apnea, chest tightness and shortness of breath.    Cardiovascular: Negative for chest pain and leg swelling.   Gastrointestinal:        See HPI   Endocrine: Negative for cold intolerance and heat intolerance.   Genitourinary: Negative for dysuria, hematuria and urgency.   Musculoskeletal: Negative for back pain, joint swelling and neck pain.   Skin: Negative for color change and dry skin.   Neurological: Negative for dizziness and confusion.   Hematological: Negative for adenopathy.   Psychiatric/Behavioral: Negative for agitation and behavioral problems.      MEDS:          Prior to Admission medications    Medication Sig Start Date End Date Taking? Authorizing Provider   busPIRone (BUSPAR) 7.5 MG tablet Take 1  tablet by mouth 3 (Three) Times a Day As Needed (anxiety). 12/6/24   Yes Padmini Hernández APRN   estradiol (MINIVELLE, VIVELLE-DOT) 0.05 MG/24HR patch APPLY 1 PATCH TO SKIN TWICE PER WEEK 12/4/24   Yes Provider, MD Johnnie   ferrous gluconate (FERGON) 324 MG tablet Take 1 tablet by mouth Daily With Breakfast. 12/17/24   Yes Alexis Feliciano PA-C   ferrous sulfate 325 (65 FE) MG tablet Take 1 tablet by mouth Daily With Breakfast. 12/6/24   Yes Padmini Hernández APRN   sodium-potassium-magnesium sulfates (SUPREP) 17.5-3.13-1.6 GM/177ML solution oral solution Take as directed 12/19/24     Bam Stevens MD         Allergies:  Oxycodone-acetaminophen        Objective  Vital Signs      Physical Exam:                General Appearance:    Alert, cooperative, in no acute distress   Head:    Normocephalic, without obvious abnormality, atraumatic   Eyes:          Conjunctivae and sclerae normal, no icterus,      Ears:    Ears appear intact with no abnormalities noted   Throat:   No oral lesions, no thrush, oral mucosa moist   Neck:   No adenopathy, supple, trachea midline, no thyromegaly   Back:     No kyphosis present, no scoliosis present, no skin lesions,      erythema or scars, no tenderness to percussion or                   palpation,   range of motion normal   Lungs:     Clear to auscultation,respirations regular, even and                  unlabored    Heart:    Regular rhythm and normal rate, normal S1 and S2, no            murmur, no gallop, no rub, no click   Chest Wall:    No abnormalities observed   Abdomen:     Normal bowel sounds, no masses, no organomegaly, soft        non-tender, non-distended, no guarding, no rebound                tenderness   Rectal:   Noninflamed hemorrhoid skin tag   Extremities:   Moves all extremities well, no edema, no cyanosis, no             redness   Pulses:   Pulses palpable and equal bilaterally   Skin:   No bleeding, bruising or rash   Lymph nodes:   No  "palpable adenopathy   Neurologic:   A/o x 4 with no deficits         Results Review:              {Results Review:[id number]::\"I reviewed the patient's new clinical results.\"     LABS/IMAGING:         Results for orders placed or performed in visit on 12/17/24   CBC Auto Differential     Collection Time: 12/17/24  2:36 PM     Specimen: Arm, Right; Blood   Result Value Ref Range     WBC 10.97 (H) 3.40 - 10.80 10*3/mm3     RBC 4.55 3.77 - 5.28 10*6/mm3     Hemoglobin 14.0 12.0 - 15.9 g/dL     Hematocrit 42.6 34.0 - 46.6 %     MCV 93.6 79.0 - 97.0 fL     MCH 30.8 26.6 - 33.0 pg     MCHC 32.9 31.5 - 35.7 g/dL     RDW 11.9 (L) 12.3 - 15.4 %     RDW-SD 41.1 37.0 - 54.0 fl     MPV 9.3 6.0 - 12.0 fL     Platelets 320 140 - 450 10*3/mm3     Neutrophil % 70.1 42.7 - 76.0 %     Lymphocyte % 17.1 (L) 19.6 - 45.3 %     Monocyte % 8.0 5.0 - 12.0 %     Eosinophil % 3.7 0.3 - 6.2 %     Basophil % 0.8 0.0 - 1.5 %     Immature Grans % 0.3 0.0 - 0.5 %     Neutrophils, Absolute 7.68 (H) 1.70 - 7.00 10*3/mm3     Lymphocytes, Absolute 1.88 0.70 - 3.10 10*3/mm3     Monocytes, Absolute 0.88 0.10 - 0.90 10*3/mm3     Eosinophils, Absolute 0.41 (H) 0.00 - 0.40 10*3/mm3     Basophils, Absolute 0.09 0.00 - 0.20 10*3/mm3     Immature Grans, Absolute 0.03 0.00 - 0.05 10*3/mm3     nRBC 0.0 0.0 - 0.2 /100 WBC   Lactate Dehydrogenase     Collection Time: 12/17/24  2:36 PM     Specimen: Arm, Right; Blood   Result Value Ref Range      135 - 214 U/L   Comprehensive Metabolic Panel     Collection Time: 12/17/24  2:36 PM     Specimen: Arm, Right; Blood   Result Value Ref Range     Glucose 82 65 - 99 mg/dL     BUN 10 6 - 20 mg/dL     Creatinine 0.60 0.57 - 1.00 mg/dL     Sodium 139 136 - 145 mmol/L     Potassium 4.1 3.5 - 5.2 mmol/L     Chloride 104 98 - 107 mmol/L     CO2 26.6 22.0 - 29.0 mmol/L     Calcium 9.3 8.6 - 10.5 mg/dL     Total Protein 7.3 6.0 - 8.5 g/dL     Albumin 4.3 3.5 - 5.2 g/dL     ALT (SGPT) 15 1 - 33 U/L     AST (SGOT) 15 1 - 32 U/L "     Alkaline Phosphatase 76 39 - 117 U/L     Total Bilirubin 0.3 0.0 - 1.2 mg/dL     Globulin 3.0 gm/dL     A/G Ratio 1.4 g/dL     BUN/Creatinine Ratio 16.7 7.0 - 25.0     Anion Gap 8.4 5.0 - 15.0 mmol/L     eGFR 127.9 >60.0 mL/min/1.73   HIV-1 / O / 2 Ag / Antibody     Collection Time: 12/17/24  2:36 PM     Specimen: Arm, Right; Blood   Result Value Ref Range     HIV-1/ HIV-2 Ab Non-Reactive Non-Reactive     HIV-1 P24 Ag Screen Non-Reactive Non-Reactive   Uric Acid     Collection Time: 12/17/24  2:36 PM     Specimen: Arm, Right; Blood   Result Value Ref Range     Uric Acid 4.3 2.4 - 5.7 mg/dL   Hepatitis Panel, Acute     Collection Time: 12/17/24  2:36 PM     Specimen: Arm, Right; Blood   Result Value Ref Range     Hepatitis B Surface Ag Non-Reactive Non-Reactive     Hep A IgM Non-Reactive Non-Reactive     Hep B C IgM Non-Reactive Non-Reactive     Hepatitis C Ab Non-Reactive Non-Reactive   Sedimentation Rate     Collection Time: 12/17/24  2:36 PM     Specimen: Arm, Right; Blood   Result Value Ref Range     Sed Rate 3 0 - 20 mm/hr   Vitamin B12 & Folate     Collection Time: 12/17/24  2:36 PM     Specimen: Arm, Right; Blood   Result Value Ref Range     Folate 12.50 4.78 - 24.20 ng/mL     Vitamin B-12 513 211 - 946 pg/mL   Hemochromatosis Mutation     Collection Time: 12/17/24  2:36 PM     Specimen: Arm, Right; Blood   Result Value Ref Range     Hemochromatosis Gene Comment       REVIEWED BY Comment     Iron Profile     Collection Time: 12/17/24  2:36 PM     Specimen: Arm, Right; Blood   Result Value Ref Range     Iron 95 37 - 145 mcg/dL     Iron Saturation (TSAT) 28 20 - 50 %     Transferrin 231 200 - 360 mg/dL     TIBC 344 298 - 536 mcg/dL   Ferritin     Collection Time: 12/17/24  2:36 PM     Specimen: Arm, Right; Blood   Result Value Ref Range     Ferritin 45.84 13.00 - 150.00 ng/mL   Manual Differential     Collection Time: 12/17/24  2:36 PM     Specimen: Arm, Right; Blood   Result Value Ref Range     Neutrophil %  75.0 42.7 - 76.0 %     Lymphocyte % 18.0 (L) 19.6 - 45.3 %     Monocyte % 4.0 (L) 5.0 - 12.0 %     Eosinophil % 1.0 0.3 - 6.2 %     Basophil % 2.0 (H) 0.0 - 1.5 %     Neutrophils Absolute 8.23 (H) 1.70 - 7.00 10*3/mm3     Lymphocytes Absolute 1.97 0.70 - 3.10 10*3/mm3     Monocytes Absolute 0.44 0.10 - 0.90 10*3/mm3     Eosinophils Absolute 0.11 0.00 - 0.40 10*3/mm3     Basophils Absolute 0.22 (H) 0.00 - 0.20 10*3/mm3     RBC Morphology Normal Normal     WBC Morphology Normal Normal     Platelet Estimate Adequate Normal   Peripheral Blood Smear     Collection Time: 12/17/24  2:36 PM     Specimen: Arm, Right; Blood   Result Value Ref Range     Pathology Review Yes     Pathology Consultation     Collection Time: 12/17/24  2:36 PM     Specimen: Blood, Venous   Result Value Ref Range     Final Diagnosis           Peripheral blood (CBC and smear):               - WBC:  Leukocytosis, neutrophilia, and eosinophilia with neutrophil toxic change               - RBC:  Normocytic normochromic RBCs               - Platelets:  Normal platelet count and morphology     Comment:  The following test was performed at a reference laboratory on the peripheral blood. See separate reference laboratory report for additional information.       Flow cytometry: No significant immunophenotypic abnormality detected.         Clinical Information           Leukocytosis, unspecified type        Gross Description           1. Blood, Venous.  One (1) Arechiga-stained peripheral blood smear is received for evaluation. The smear is accompanied by a CBC analysis report and marked as a physician/advanced practice clinician review.                 Microscopic Description           Microscopic examination performed.        Case Report           Surgical Pathology Report                         Case: [id number]                                  Authorizing Provider:  Alexis Feliciano PA-C        Collected:           12/17/2024 02:36 PM          Ordering  Location:     [facility]     Received:            12/18/2024 09:06 AM                                 GROUP HEMATOLOGY &                                                                                  ONCOLOGY                                                                     Pathologist:           Francesco Gan MD                                                            Specimen:    Blood, Venous, PBS                                                                         Flow Cytometry     Collection Time: 12/17/24  2:36 PM     Specimen: Arm, Right; Blood   Result Value Ref Range     Consult Global Result Comment^Text^TXT              Result Review  : Labs  Result Review  Imaging  Med Tab  Media         Assessment & Plan  Symptomatic hemorrhoid skin tag  Hematochezia  Constipation     Discussion with patient.  For her constipation I recommended referral to gastroenterology for further evaluation and treatment.  She is agreeable.  Orders placed.  I instructed her to start over-the-counter fiber.  I instructed her to be on and off the toilet quickly.  For her hematochezia I recommended colonoscopy with rubber band ligation of her internal hemorrhoids.  Benefits and alternatives discussed.  Risk procedure including risk of bleeding, perforation, missing a polyp, pain, infection discussed.  She agrees with the plan.  Orders placed.  I then recommended proceeding with excision of the hemorrhoid skin tag in the operating room.  Procedure and recovery discussed.  Benefits and alternatives discussed.  Risk procedure including risk of anesthesia, bleeding, infection, recurrence, pain discussed.  All questions answered.  She agrees with the plan.

## 2025-02-24 NOTE — ANESTHESIA PREPROCEDURE EVALUATION
Anesthesia Evaluation     Patient summary reviewed and Nursing notes reviewed   no history of anesthetic complications:   NPO Solid Status: > 8 hours  NPO Liquid Status: > 2 hours           Airway   Mallampati: III  TM distance: <3 FB  Neck ROM: full  No difficulty expected  Dental - normal exam     Pulmonary - normal exam    breath sounds clear to auscultation  (+) a smoker (0.5 ppd) Current,  Cardiovascular - normal exam  Exercise tolerance: good (4-7 METS)    Rhythm: regular  Rate: normal    (+) hyperlipidemia      Neuro/Psych  (+) headaches, psychiatric history  GI/Hepatic/Renal/Endo    (+) GERD (rare, controlled with OTC)    Musculoskeletal (-) negative ROS    Abdominal    Substance History   (+) drug use (MJ)     OB/GYN negative ob/gyn ROS         Other - negative ROS       ROS/Med Hx Other: PAT Nursing Notes unavailable.                Anesthesia Plan    ASA 2     general     (Patient understands anesthesia not responsible for dental damage.)  intravenous induction     Anesthetic plan, risks, benefits, and alternatives have been provided, discussed and informed consent has been obtained with: patient.    Use of blood products discussed with patient .    Plan discussed with CRNA.    CODE STATUS:

## 2025-02-24 NOTE — ANESTHESIA POSTPROCEDURE EVALUATION
Patient: Janey Wakefield    Procedure Summary       Date: 02/24/25 Room / Location: [facility] OR  / [facility]    Anesthesia Start: 1334 Anesthesia Stop: 1408    Procedure: EXCISION ANAL SKIN TAG (Anus) Diagnosis:       Hemorrhoidal skin tag      (Hemorrhoidal skin tag [K64.4])    Surgeons: Bam Stevens MD Provider: Federico Faye MD    Anesthesia Type: general ASA Status: 2            Anesthesia Type: general    Vitals  Vitals Value Taken Time   /71 02/24/25 1440   Temp 36.6 °C (97.8 °F) 02/24/25 1440   Pulse 64 02/24/25 1440   Resp 16 02/24/25 1440   SpO2 98 % 02/24/25 1440           Post Anesthesia Care and Evaluation    Patient location during evaluation: bedside  Patient participation: complete - patient participated  Level of consciousness: awake  Pain management: adequate    Airway patency: patent  PONV Status: none  Cardiovascular status: acceptable and stable  Respiratory status: acceptable  Hydration status: acceptable

## 2025-02-27 ENCOUNTER — HOSPITAL ENCOUNTER (OUTPATIENT)
Dept: GENERAL RADIOLOGY | Facility: HOSPITAL | Age: 25
Discharge: HOME | End: 2025-02-27
Payer: COMMERCIAL

## 2025-02-27 ENCOUNTER — DOCUMENTATION (OUTPATIENT)
Dept: OBSTETRICS AND GYNECOLOGY | Facility: HOSPITAL | Age: 25
End: 2025-02-27
Payer: COMMERCIAL

## 2025-02-27 DIAGNOSIS — N97.1 FEMALE INFERTILITY OF TUBAL ORIGIN: ICD-10-CM

## 2025-02-27 PROCEDURE — 74740 X-RAY FEMALE GENITAL TRACT: CPT

## 2025-02-27 PROCEDURE — 25510000001 IOPAMIDOL PER 1 ML: Performed by: OBSTETRICS & GYNECOLOGY

## 2025-02-27 RX ORDER — IOPAMIDOL 755 MG/ML
100 INJECTION, SOLUTION INTRAVASCULAR
Status: COMPLETED | OUTPATIENT
Start: 2025-02-27 | End: 2025-02-27

## 2025-02-27 RX ORDER — IOPAMIDOL 755 MG/ML
50 INJECTION, SOLUTION INTRAVASCULAR
Status: DISCONTINUED | OUTPATIENT
Start: 2025-02-27 | End: 2025-02-27

## 2025-02-27 RX ADMIN — IOPAMIDOL 7 ML: 755 INJECTION, SOLUTION INTRAVENOUS at 13:42

## 2025-02-27 NOTE — PROGRESS NOTES
Procedure     Hysterosalpingogram.    Identity confirmed and consent obtained and procedure once again explained.  Previous right salpingo-oophorectomy.    Sterile technique. Catheter placed.    A total of 20 ml of Isovue used.    There was no evidence of flow into the left tube and no free spillage into the abdomen.    NO TUBAL PATENCY.

## 2025-03-05 ENCOUNTER — HOSPITAL ENCOUNTER (OUTPATIENT)
Dept: RESPIRATORY THERAPY | Facility: HOSPITAL | Age: 25
Discharge: HOME | End: 2025-03-05
Payer: COMMERCIAL

## 2025-03-05 DIAGNOSIS — R06.2 WHEEZING: ICD-10-CM

## 2025-03-05 PROCEDURE — 94729 DIFFUSING CAPACITY: CPT

## 2025-03-05 PROCEDURE — 94060 EVALUATION OF WHEEZING: CPT

## 2025-03-05 PROCEDURE — 94726 PLETHYSMOGRAPHY LUNG VOLUMES: CPT

## 2025-03-05 RX ORDER — ALBUTEROL SULFATE 0.83 MG/ML
SOLUTION RESPIRATORY (INHALATION)
Status: COMPLETED
Start: 2025-03-05 | End: 2025-03-05

## 2025-03-05 RX ORDER — ALBUTEROL SULFATE 0.83 MG/ML
2.5 SOLUTION RESPIRATORY (INHALATION) ONCE
Status: COMPLETED | OUTPATIENT
Start: 2025-03-05 | End: 2025-03-05

## 2025-03-05 RX ADMIN — ALBUTEROL SULFATE 2.5 MG: 2.5 SOLUTION RESPIRATORY (INHALATION) at 10:39

## 2025-03-05 RX ADMIN — ALBUTEROL SULFATE 2.5 MG: 0.83 SOLUTION RESPIRATORY (INHALATION) at 10:39

## 2025-03-08 DIAGNOSIS — F41.1 GENERALIZED ANXIETY DISORDER: ICD-10-CM

## 2025-03-10 RX ORDER — BUSPIRONE HYDROCHLORIDE 7.5 MG/1
7.5 TABLET ORAL 3 TIMES DAILY PRN
Qty: 30 TABLET | Refills: 2
Start: 2025-03-10

## 2025-03-14 ENCOUNTER — TELEPHONE (OUTPATIENT)
Dept: SURGERY | Facility: CLINIC | Age: 25
End: 2025-03-14
Payer: COMMERCIAL

## 2025-03-19 ENCOUNTER — LAB (OUTPATIENT)
Dept: LAB | Facility: HOSPITAL | Age: 25
End: 2025-03-19
Payer: COMMERCIAL

## 2025-03-19 ENCOUNTER — TRANSCRIBE ORDERS (OUTPATIENT)
Dept: LAB | Facility: HOSPITAL | Age: 25
End: 2025-03-19
Payer: COMMERCIAL

## 2025-03-19 DIAGNOSIS — F41.1 ANXIETY, GENERALIZED: ICD-10-CM

## 2025-03-19 DIAGNOSIS — F90.2 ATTENTION DEFICIT HYPERACTIVITY DISORDER, COMBINED TYPE: ICD-10-CM

## 2025-03-19 DIAGNOSIS — F33.2 DEPRESSION, ENDOGENOUS: Primary | ICD-10-CM

## 2025-03-19 DIAGNOSIS — F33.2 DEPRESSION, ENDOGENOUS: ICD-10-CM

## 2025-03-19 LAB
25(OH)D3 SERPL-MCNC: 20.2 NG/ML (ref 30–100)
BASOPHILS # BLD AUTO: 0.09 10*3/MM3 (ref 0–0.2)
BASOPHILS NFR BLD AUTO: 1.3 % (ref 0–1.5)
CHOLEST SERPL-MCNC: 180 MG/DL (ref 0–200)
CORTIS SERPL-MCNC: 5.26 MCG/DL
CRP SERPL-MCNC: <0.3 MG/DL (ref 0–0.5)
DEPRECATED RDW RBC AUTO: 42.5 FL (ref 37–54)
EOSINOPHIL # BLD AUTO: 0.11 10*3/MM3 (ref 0–0.4)
EOSINOPHIL NFR BLD AUTO: 1.6 % (ref 0.3–6.2)
ERYTHROCYTE [DISTWIDTH] IN BLOOD BY AUTOMATED COUNT: 11.8 % (ref 12.3–15.4)
FERRITIN SERPL-MCNC: 91.3 NG/ML (ref 13–150)
FOLATE SERPL-MCNC: >20 NG/ML (ref 4.78–24.2)
HCT VFR BLD AUTO: 41.4 % (ref 34–46.6)
HDLC SERPL-MCNC: 63 MG/DL (ref 40–60)
HGB BLD-MCNC: 13.4 G/DL (ref 12–15.9)
IMM GRANULOCYTES # BLD AUTO: 0.01 10*3/MM3 (ref 0–0.05)
IMM GRANULOCYTES NFR BLD AUTO: 0.1 % (ref 0–0.5)
IRON 24H UR-MRATE: 132 MCG/DL (ref 37–145)
IRON SATN MFR SERPL: 41 % (ref 20–50)
LDLC SERPL CALC-MCNC: 98 MG/DL (ref 0–100)
LDLC/HDLC SERPL: 1.52 {RATIO}
LYMPHOCYTES # BLD AUTO: 1.84 10*3/MM3 (ref 0.7–3.1)
LYMPHOCYTES NFR BLD AUTO: 26.9 % (ref 19.6–45.3)
MAGNESIUM SERPL-MCNC: 1.9 MG/DL (ref 1.6–2.6)
MCH RBC QN AUTO: 31.7 PG (ref 26.6–33)
MCHC RBC AUTO-ENTMCNC: 32.4 G/DL (ref 31.5–35.7)
MCV RBC AUTO: 97.9 FL (ref 79–97)
MONOCYTES # BLD AUTO: 0.57 10*3/MM3 (ref 0.1–0.9)
MONOCYTES NFR BLD AUTO: 8.3 % (ref 5–12)
NEUTROPHILS NFR BLD AUTO: 4.22 10*3/MM3 (ref 1.7–7)
NEUTROPHILS NFR BLD AUTO: 61.8 % (ref 42.7–76)
NRBC BLD AUTO-RTO: 0 /100 WBC (ref 0–0.2)
PLATELET # BLD AUTO: 327 10*3/MM3 (ref 140–450)
PMV BLD AUTO: 10.8 FL (ref 6–12)
RBC # BLD AUTO: 4.23 10*6/MM3 (ref 3.77–5.28)
T4 FREE SERPL-MCNC: 1.23 NG/DL (ref 0.92–1.68)
TIBC SERPL-MCNC: 320 MCG/DL (ref 298–536)
TRANSFERRIN SERPL-MCNC: 215 MG/DL (ref 200–360)
TRIGL SERPL-MCNC: 107 MG/DL (ref 0–150)
TSH SERPL DL<=0.05 MIU/L-ACNC: 0.6 UIU/ML (ref 0.27–4.2)
VIT B12 BLD-MCNC: 513 PG/ML (ref 211–946)
VLDLC SERPL-MCNC: 19 MG/DL (ref 5–40)
WBC NRBC COR # BLD AUTO: 6.84 10*3/MM3 (ref 3.4–10.8)

## 2025-03-19 PROCEDURE — 86225 DNA ANTIBODY NATIVE: CPT

## 2025-03-19 PROCEDURE — 86235 NUCLEAR ANTIGEN ANTIBODY: CPT

## 2025-03-19 PROCEDURE — 84466 ASSAY OF TRANSFERRIN: CPT

## 2025-03-19 PROCEDURE — 84443 ASSAY THYROID STIM HORMONE: CPT

## 2025-03-19 PROCEDURE — 85025 COMPLETE CBC W/AUTO DIFF WBC: CPT

## 2025-03-19 PROCEDURE — 82746 ASSAY OF FOLIC ACID SERUM: CPT

## 2025-03-19 PROCEDURE — 82728 ASSAY OF FERRITIN: CPT

## 2025-03-19 PROCEDURE — 83036 HEMOGLOBIN GLYCOSYLATED A1C: CPT

## 2025-03-19 PROCEDURE — 36415 COLL VENOUS BLD VENIPUNCTURE: CPT

## 2025-03-19 PROCEDURE — 80061 LIPID PANEL: CPT

## 2025-03-19 PROCEDURE — 82306 VITAMIN D 25 HYDROXY: CPT

## 2025-03-19 PROCEDURE — 84439 ASSAY OF FREE THYROXINE: CPT

## 2025-03-19 PROCEDURE — 86140 C-REACTIVE PROTEIN: CPT

## 2025-03-19 PROCEDURE — 82533 TOTAL CORTISOL: CPT

## 2025-03-19 PROCEDURE — 84260 ASSAY OF SEROTONIN: CPT

## 2025-03-19 PROCEDURE — 83540 ASSAY OF IRON: CPT

## 2025-03-19 PROCEDURE — 83735 ASSAY OF MAGNESIUM: CPT

## 2025-03-19 PROCEDURE — 86038 ANTINUCLEAR ANTIBODIES: CPT

## 2025-03-19 PROCEDURE — 82607 VITAMIN B-12: CPT

## 2025-03-20 LAB
ANA SER QL: NEGATIVE
CENTROMERE B AB SER-ACNC: <0.2 AI (ref 0–0.9)
CHROMATIN AB SERPL-ACNC: <0.2 AI (ref 0–0.9)
DSDNA AB SER-ACNC: 1 IU/ML (ref 0–9)
ENA JO1 AB SER-ACNC: <0.2 AI (ref 0–0.9)
ENA RNP AB SER-ACNC: <0.2 AI (ref 0–0.9)
ENA SCL70 AB SER-ACNC: <0.2 AI (ref 0–0.9)
ENA SM AB SER-ACNC: <0.2 AI (ref 0–0.9)
ENA SS-A AB SER-ACNC: <0.2 AI (ref 0–0.9)
ENA SS-B AB SER-ACNC: <0.2 AI (ref 0–0.9)
HBA1C MFR BLD: 4.7 % (ref 4.8–5.6)
Lab: NORMAL

## 2025-03-20 NOTE — TELEPHONE ENCOUNTER
3RD CALL - CALLED PT TO OFFER R/S OF CX'D POST OP 3/11 W/ KALLI    NO ANSWER, VM FULL.    THREE ATTEMPTS MADE TO OFFER R/S, ANYTHING ELSE TO DO?

## 2025-03-22 LAB — SEROTONIN SER-MCNC: 64 NG/ML (ref 31–207)

## 2025-08-11 DIAGNOSIS — F41.1 GENERALIZED ANXIETY DISORDER: ICD-10-CM

## 2025-08-11 RX ORDER — BUSPIRONE HYDROCHLORIDE 7.5 MG/1
7.5 TABLET ORAL 3 TIMES DAILY PRN
Qty: 30 TABLET | Refills: 2
Start: 2025-08-11

## (undated) DEVICE — LITHOTOMY-YELLOW FINS: Brand: MEDLINE INDUSTRIES, INC.

## (undated) DEVICE — PENCL SMOKE/EVAC MEGADYNE TELESCP 10FT

## (undated) DEVICE — GLV SURG BIOGEL LTX PF 7

## (undated) DEVICE — VAGINAL PREP TRAY: Brand: MEDLINE INDUSTRIES, INC.

## (undated) DEVICE — LAPAROSCOPIC TROCAR SLEEVE/SINGLE USE: Brand: KII® OPTICAL ACCESS SYSTEM

## (undated) DEVICE — ENDOPATH XCEL DILATING TIP TROCARS WITH STABILITY SLEEVES: Brand: ENDOPATH XCEL

## (undated) DEVICE — SOLIDIFIER LIQLOC PLS 1500CC BT

## (undated) DEVICE — SYR LL TP 10ML STRL

## (undated) DEVICE — TBG INSUFFLATION W/CPC CONNEC: Brand: MEDLINE INDUSTRIES, INC.

## (undated) DEVICE — TISSUE RETRIEVAL SYSTEM: Brand: INZII RETRIEVAL SYSTEM

## (undated) DEVICE — MARYLAND JAW LAPAROSCOPIC SEALER/DIVIDER COATED: Brand: LIGASURE

## (undated) DEVICE — STRAP STIRUP SLP RNG 19X3.5IN DISP

## (undated) DEVICE — GLV SURG SENSICARE PI ORTHO PF SZ7 LF STRL

## (undated) DEVICE — INTENDED FOR TISSUE SEPARATION, AND OTHER PROCEDURES THAT REQUIRE A SHARP SURGICAL BLADE TO PUNCTURE OR CUT.: Brand: BARD-PARKER ® CARBON RIB-BACK BLADES

## (undated) DEVICE — ENDOPATH XCEL WITH OPTIVIEW TECHNOLOGY BLADELESS TROCARS WITH STABILITY SLEEVES: Brand: ENDOPATH XCEL OPTIVIEW

## (undated) DEVICE — GAUZE,SPONGE,4"X4",16PLY,STRL,LF,10/TRAY: Brand: MEDLINE

## (undated) DEVICE — DUAL LUMEN STOMACH TUBE: Brand: SALEM SUMP

## (undated) DEVICE — GLV SURG BIOGEL LTX PF 7 1/2

## (undated) DEVICE — SLV SCD KN/LEN ADJ EXPRSS BLENDED MD 1P/U

## (undated) DEVICE — STERILE POLYISOPRENE POWDER-FREE SURGICAL GLOVES WITH EMOLLIENT COATING: Brand: PROTEXIS

## (undated) DEVICE — TOTAL TRAY, 16FR 10ML SIL FOLEY, URN: Brand: MEDLINE

## (undated) DEVICE — DRAPE,UNDERBUTTOCKS,PCH,STERILE: Brand: MEDLINE

## (undated) DEVICE — SUT MNCRYL PLS ANTIB UD 4/0 PS2 18IN

## (undated) DEVICE — HYPODERMIC SAFETY NEEDLE: Brand: MONOJECT

## (undated) DEVICE — COVADERM PLUS: Brand: DEROYAL

## (undated) DEVICE — ANTIBACTERIAL VIOLET BRAIDED (POLYGLACTIN 910), SYNTHETIC ABSORBABLE SUTURE: Brand: COATED VICRYL

## (undated) DEVICE — DRESSING,GAUZE,XEROFORM,CURAD,5"X9",ST: Brand: CURAD

## (undated) DEVICE — TUBING, SUCTION, 1/4" X 10', STRAIGHT: Brand: MEDLINE

## (undated) DEVICE — PCH SURG INST LAP 2 LF

## (undated) DEVICE — TOWEL,OR,DSP,ST,BLUE,STD,4/PK,20PK/CS: Brand: MEDLINE

## (undated) DEVICE — STERILE POLYISOPRENE POWDER-FREE SURGICAL GLOVES: Brand: PROTEXIS

## (undated) DEVICE — INSUFFLATION NEEDLE TO ESTABLISH PNEUMOPERITONEUM.: Brand: INSUFFLATION NEEDLE

## (undated) DEVICE — GLOVE,SURG,SENSICARE SLT,LF,PF,7.5: Brand: MEDLINE

## (undated) DEVICE — Device: Brand: DEFENDO AIR/WATER/SUCTION AND BIOPSY VALVE

## (undated) DEVICE — PAD GRND REM POLYHESIVE A/ DISP

## (undated) DEVICE — KIT,ANTI FOG,W/SPONGE & FLUID,SOFT PACK: Brand: MEDLINE

## (undated) DEVICE — SUT VIC 3/0 SH 27IN J416H

## (undated) DEVICE — Device

## (undated) DEVICE — LEGGINGS, PAIR, CLEAR, STERILE: Brand: MEDLINE

## (undated) DEVICE — STRIP,CLOSURE,WOUND,MEDI-STRIP,1/2X4: Brand: MEDLINE

## (undated) DEVICE — TROCAR: Brand: KII OPTICAL ACCESS SYSTEM

## (undated) DEVICE — DEV LIG SHORTSHOT HMROID M/BND W/TRIVIEW ANOSCP 5IN

## (undated) DEVICE — 40585 XL ADVANCED TRENDELENBURG POSITIONING KIT: Brand: 40585 XL ADVANCED TRENDELENBURG POSITIONING KIT

## (undated) DEVICE — SHEET,DRAPE,70X85,STERILE: Brand: MEDLINE

## (undated) DEVICE — LINER SURG CANSTR SXN S/RIGD 1500CC

## (undated) DEVICE — SYS CLOSE PORTII CARTR/THOMASN XL

## (undated) DEVICE — SOL IRR H2O BO 1000ML STRL

## (undated) DEVICE — KENDALL SCD EXPRESS SLEEVES, KNEE LENGTH, MEDIUM: Brand: KENDALL SCD

## (undated) DEVICE — ADHS LIQ MASTISOL 2/3ML

## (undated) DEVICE — SYR LUERLOK 30CC

## (undated) DEVICE — TROCAR: Brand: KII® SLEEVE

## (undated) DEVICE — MINOR-LF: Brand: MEDLINE INDUSTRIES, INC.

## (undated) DEVICE — DRSNG PAD ABD 8X10IN STRL

## (undated) DEVICE — YANKAUER,BULB TIP,W/O VENT,RIGID,STERILE: Brand: MEDLINE

## (undated) DEVICE — BRIEF KNIT SEAMLSS PREM 70IN 3XL PK/2

## (undated) DEVICE — SOL IRRG H2O PL/BG 1000ML STRL

## (undated) DEVICE — CONN JET HYDRA H20 AUXILIARY DISP